# Patient Record
Sex: FEMALE | Race: WHITE | NOT HISPANIC OR LATINO | Employment: OTHER | URBAN - METROPOLITAN AREA
[De-identification: names, ages, dates, MRNs, and addresses within clinical notes are randomized per-mention and may not be internally consistent; named-entity substitution may affect disease eponyms.]

---

## 2017-02-04 ENCOUNTER — OFFICE VISIT (OUTPATIENT)
Dept: FAMILY MEDICINE | Facility: CLINIC | Age: 57
End: 2017-02-04
Payer: COMMERCIAL

## 2017-02-04 VITALS
HEART RATE: 74 BPM | OXYGEN SATURATION: 98 % | HEIGHT: 68 IN | WEIGHT: 290.44 LBS | TEMPERATURE: 98 F | BODY MASS INDEX: 44.02 KG/M2

## 2017-02-04 DIAGNOSIS — E78.5 HYPERLIPIDEMIA ASSOCIATED WITH TYPE 2 DIABETES MELLITUS: ICD-10-CM

## 2017-02-04 DIAGNOSIS — E66.01 MORBID OBESITY, UNSPECIFIED OBESITY TYPE: ICD-10-CM

## 2017-02-04 DIAGNOSIS — E11.69 HYPERLIPIDEMIA ASSOCIATED WITH TYPE 2 DIABETES MELLITUS: ICD-10-CM

## 2017-02-04 DIAGNOSIS — Z01.818 PREOPERATIVE CLEARANCE: Primary | ICD-10-CM

## 2017-02-04 DIAGNOSIS — H40.9 GLAUCOMA, UNSPECIFIED GLAUCOMA, UNSPECIFIED LATERALITY: ICD-10-CM

## 2017-02-04 DIAGNOSIS — M65.312 TRIGGER FINGER OF LEFT THUMB: ICD-10-CM

## 2017-02-04 DIAGNOSIS — Z72.0 TOBACCO ABUSE DISORDER: Chronic | ICD-10-CM

## 2017-02-04 PROCEDURE — 93010 ELECTROCARDIOGRAM REPORT: CPT | Mod: S$GLB,,, | Performed by: INTERNAL MEDICINE

## 2017-02-04 PROCEDURE — 99999 PR PBB SHADOW E&M-EST. PATIENT-LVL III: CPT | Mod: PBBFAC,,, | Performed by: FAMILY MEDICINE

## 2017-02-04 PROCEDURE — 3045F PR MOST RECENT HEMOGLOBIN A1C LEVEL 7.0-9.0%: CPT | Mod: S$GLB,,, | Performed by: FAMILY MEDICINE

## 2017-02-04 PROCEDURE — 4010F ACE/ARB THERAPY RXD/TAKEN: CPT | Mod: S$GLB,,, | Performed by: FAMILY MEDICINE

## 2017-02-04 PROCEDURE — 99214 OFFICE O/P EST MOD 30 MIN: CPT | Mod: S$GLB,,, | Performed by: FAMILY MEDICINE

## 2017-02-04 PROCEDURE — 2022F DILAT RTA XM EVC RTNOPTHY: CPT | Mod: S$GLB,,, | Performed by: FAMILY MEDICINE

## 2017-02-04 PROCEDURE — 93005 ELECTROCARDIOGRAM TRACING: CPT | Mod: S$GLB,,, | Performed by: FAMILY MEDICINE

## 2017-02-04 NOTE — PROGRESS NOTES
"Subjective:      Patient ID: Chelsie Duarte is a 56 y.o. female.    Chief Complaint: preop  HPI   The patient is here for a preop clearance to have surgery on the right eye.    The physician that is performing the surgery is Dr. Garay.    The surgery is being planned for Monday.    I will send a copy of the referral to the surgeon at fax # [phone].    The patient states that there has not been previous problems with sedation.    As an aside, she has a left trigger thumb and she would like to have a referral to see an orthopedist.  NSAID"s have not worked.     Past Medical History:  Past Medical History   Diagnosis Date    DM (diabetes mellitus) type II uncontrolled with eye manifestation     Nicotine addiction      Past Surgical History   Procedure Laterality Date     section       Review of patient's allergies indicates:   Allergen Reactions    Penicillins Anaphylaxis     Current Outpatient Prescriptions on File Prior to Visit   Medication Sig Dispense Refill    atorvastatin (LIPITOR) 80 MG tablet Take 1 tablet (80 mg total) by mouth once daily. 90 tablet 3    azelastine (ASTELIN) 137 mcg (0.1 %) nasal spray 2 sprays (274 mcg total) by Nasal route 2 (two) times daily. 30 mL 2    blood sugar diagnostic (CONTOUR TEST STRIPS) Strp Use daily as directed 100 each 11    brimonidine-timolol (COMBIGAN) 0.2-0.5 % Drop Place 1 drop into the right eye 2 (two) times daily. 10 mL 0    insulin glargine (LANTUS SOLOSTAR) 100 unit/mL (3 mL) InPn pen Inject 60 Units into the skin every morning. 15 mL 11    insulin lispro (HUMALOG KWIKPEN) 100 unit/mL InPn pen Sig: Give the following sliding scale three times a day before meals based on pre-meal glucose check:   If less than 150, give 1 unit SQ.     If 151-200, give 2 units SQ   If 201-250, give 3 units SQ   If 251-300, give 4 units SQ   If 301-350, give 5 units SQ   If 351-400, give 6 units SQ   If greater than 400, call MD. 1 Box 11    insulin " "needles, disposable, (NOVOFINE 32) 32 x 1/4 " Ndle by Misc.(Non-Drug; Combo Route) route.      lisinopril (PRINIVIL,ZESTRIL) 2.5 MG tablet Take 1 tablet (2.5 mg total) by mouth once daily. 30 tablet 11    meloxicam (MOBIC) 15 MG tablet Take 1 tablet (15 mg total) by mouth once daily. 30 tablet 0    albuterol 90 mcg/actuation inhaler Inhale 2 puffs into the lungs every 6 (six) hours as needed for Wheezing. 18 g 1     No current facility-administered medications on file prior to visit.      Social History     Social History    Marital status:      Spouse name: N/A    Number of children: N/A    Years of education: N/A     Occupational History     [employer]     Social History Main Topics    Smoking status: Current Every Day Smoker     Packs/day: 0.50     Types: Cigarettes    Smokeless tobacco: Never Used      Comment: she does not want medicine to quit.     Alcohol use Yes    Drug use: No    Sexual activity: Not on file     Other Topics Concern    Not on file     Social History Narrative     Family History   Problem Relation Age of Onset    Cancer Mother      pancreatic    Cancer Father      lung    Stroke Father     Coronary artery disease Father     Diabetes Father     Hypertension Father     Bladder Cancer Paternal Uncle     Lymphoma Paternal Uncle          Review of Systems   Constitutional: Negative for fatigue, fever and unexpected weight change.   HENT: Negative for congestion, ear pain, postnasal drip and sore throat.    Eyes: Negative for visual disturbance (her pressure was high and they are putting a tube in her eye.).   Respiratory: Negative for cough, chest tightness, shortness of breath and wheezing.    Cardiovascular: Negative for chest pain, palpitations and leg swelling.   Gastrointestinal: Negative for abdominal pain, blood in stool, constipation, diarrhea, nausea and vomiting.   Genitourinary: Negative for dysuria and hematuria.   Musculoskeletal: Positive " "for arthralgias.   Neurological: Negative for weakness and numbness.       Objective:     Visit Vitals    Pulse 74    Temp 98.3 °F (36.8 °C) (Oral)    Ht 5' 8" (1.727 m)    Wt 131.8 kg (290 lb 7.3 oz)    LMP  (Approximate)    SpO2 98%    BMI 44.16 kg/m2       Physical Exam   Constitutional: She appears well-developed and well-nourished. She is cooperative.   HENT:   Head: Normocephalic and atraumatic.   Right Ear: Tympanic membrane, external ear and ear canal normal.   Left Ear: Tympanic membrane, external ear and ear canal normal.   Nose: Nose normal.   Mouth/Throat: Uvula is midline and mucous membranes are normal. No oral lesions. No oropharyngeal exudate, posterior oropharyngeal edema or posterior oropharyngeal erythema.   Eyes: EOM and lids are normal. Pupils are equal, round, and reactive to light. Right eye exhibits no discharge. Left eye exhibits no discharge. Right conjunctiva is not injected. Right conjunctiva has no hemorrhage. Left conjunctiva is not injected. Left conjunctiva has no hemorrhage. No scleral icterus. Right eye exhibits no nystagmus. Left eye exhibits no nystagmus.   Neck: Normal range of motion and full passive range of motion without pain. Neck supple. No JVD present. No tracheal tenderness present. Carotid bruit is not present. No tracheal deviation present. No thyroid mass and no thyromegaly present.   Cardiovascular: Normal rate, regular rhythm, S1 normal and S2 normal.    No murmur heard.  Pulses:       Carotid pulses are 2+ on the right side, and 2+ on the left side.       Radial pulses are 2+ on the right side, and 2+ on the left side.        Posterior tibial pulses are 2+ on the right side, and 2+ on the left side.   Pulmonary/Chest: Effort normal and breath sounds normal. No respiratory distress. She has no wheezes. She has no rhonchi. She has no rales.   Abdominal: Soft. Normal appearance, normal aorta and bowel sounds are normal. She exhibits no distension, no abdominal " bruit, no pulsatile midline mass and no mass. There is no hepatosplenomegaly. There is no tenderness. There is no rebound.   Musculoskeletal:        Right knee: She exhibits no swelling. No tenderness found.        Left knee: She exhibits no swelling. No tenderness found.   She has a left trigger thumb with pain on palpation.    Lymphadenopathy:        Head (right side): No submental and no submandibular adenopathy present.        Head (left side): No submental and no submandibular adenopathy present.     She has no cervical adenopathy.   Neurological: She is alert. She has normal strength. No cranial nerve deficit or sensory deficit.   Skin: Skin is warm and dry. No rash noted. No cyanosis. Nails show no clubbing.   Psychiatric: She has a normal mood and affect. Her speech is normal and behavior is normal. Thought content normal. Cognition and memory are normal.      EKG shows a NSR with a rate of 68 and no acute ST or T wave changes being noted.    Assessment:     1. Preoperative clearance    2. Glaucoma, unspecified glaucoma, unspecified laterality    3. 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 Hyperlipidemia associated with type 2 diabetes mellitus    5. Morbid obesity, unspecified obesity type    6. Tobacco abuse disorder    7. 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

## 2017-02-04 NOTE — MR AVS SNAPSHOT
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, with long-term current use of insulin         Hyperlipidemia associated with type 2 diabetes mellitus         Morbid obesity, unspecified obesity type         Tobacco abuse disorder         Trigger finger of left thumb                To Do List           Future Appointments        Provider Department Dept Phone    2017 8:05 AM LABORATORY, [place] [facility] [phone]    3/1/2017 8:00 AM Alexis De La Fuente MD [facility] Orthoped[facility] [phone]      Goals (5 Years of Data)     None      [facility] On Call     [facility] On [facility] Nurse Care Line - 24/7 Assistance  Registered nurses in the [facility] Center provide clinical advisement, health education, appointment booking, and other advisory services.  Call for this free service at [phone].             Medications           Message regarding Medications     Verify the changes and/or additions to your medication regime listed below are the same as discussed with your clinician today.  If any of these changes or additions are incorrect, please notify your healthcare provider.             Verify that the below list of medications is an accurate representation of the medications you are currently taking.  If none reported, the list may be blank. If incorrect, please contact your healthcare provider. Carry this list with you in case of emergency.           Current  "Medications     albuterol 90 mcg/actuation inhaler Inhale 2 puffs into the lungs every 6 (six) hours as needed for Wheezing.    atorvastatin (LIPITOR) 80 MG tablet Take 1 tablet (80 mg total) by mouth once daily.    azelastine (ASTELIN) 137 mcg (0.1 %) nasal spray 2 sprays (274 mcg total) by Nasal route 2 (two) times daily.    blood sugar diagnostic (CONTOUR TEST STRIPS) Strp Use daily as directed    brimonidine-timolol (COMBIGAN) 0.2-0.5 % Drop Place 1 drop into the right eye 2 (two) times daily.    insulin glargine (LANTUS SOLOSTAR) 100 unit/mL (3 mL) InPn pen Inject 60 Units into the skin every morning.    insulin lispro (HUMALOG KWIKPEN) 100 unit/mL InPn pen Sig: Give the following sliding scale three times a day before meals based on pre-meal glucose check:   If less than 150, give 1 unit SQ.     If 151-200, give 2 units SQ   If 201-250, give 3 units SQ   If 251-300, give 4 units SQ   If 301-350, give 5 units SQ   If 351-400, give 6 units SQ   If greater than 400, call MD.    insulin needles, disposable, (NOVOFINE 32) 32 x 1/4 " Ndle by Misc.(Non-Drug; Combo Route) route.    lisinopril (PRINIVIL,ZESTRIL) 2.5 MG tablet Take 1 tablet (2.5 mg total) by mouth once daily.    meloxicam (MOBIC) 15 MG tablet Take 1 tablet (15 mg total) by mouth once daily.           Clinical Reference Information           Your Vitals Were     Pulse Temp Height Weight Last Period SpO2    74 98.3 °F (36.8 °C) (Oral) 5' 8" (1.727 m) 131.8 kg (290 lb 7.3 oz) (Approximate) 98%    BMI                44.16 kg/m2          Allergies as of 2/4/2017     Penicillins      Immunizations Administered on Date of Encounter - 2/4/2017     None      Orders Placed During Today's Visit      Normal Orders This Visit    Ambulatory referral to Orthopedics     Future Labs/Procedures Expected by Expires    EKG 12-lead  2/4/2017 (Approximate) 2/4/2018      Smoking Cessation     If you would like to quit smoking:   You may be eligible for free services if you " are a [place] resident and started smoking cigarettes before September 1, 1988.  Call the Smoking Cessation Trust (SCT) toll free at (940) 934-7747 or (492) 036-8833.   Call 1-800-QUIT-NOW if you do not meet the above criteria.            Language Assistance Services     ATTENTION: Language assistance services are available, free of charge. Please call [phone].      ATENCIÓN: Si habla español, tiene a bolanos disposición servicios gratuitos de asistencia lingüística. Llame al [phone].     CHÚ Ý: N?u b?n nói Ti?ng Vi?t, có các d?ch v? h? tr? ngôn ng? mi?n phí dành cho b?n. G?i s? [phone].         [facility] complies with applicable Federal civil rights laws and does not discriminate on the basis of race, color, national origin, age, disability, or sex.

## 2017-02-14 ENCOUNTER — LAB VISIT (OUTPATIENT)
Dept: LAB | Facility: HOSPITAL | Age: 57
End: 2017-02-14
Attending: FAMILY MEDICINE
Payer: COMMERCIAL

## 2017-02-14 DIAGNOSIS — E78.5 HYPERLIPIDEMIA: ICD-10-CM

## 2017-02-14 LAB
ALBUMIN SERPL BCP-MCNC: 3.2 G/DL
ALP SERPL-CCNC: 83 U/L
ALT SERPL W/O P-5'-P-CCNC: 14 U/L
AST SERPL-CCNC: 16 U/L
BILIRUB DIRECT SERPL-MCNC: 0.2 MG/DL
BILIRUB SERPL-MCNC: 0.6 MG/DL
CHOLEST/HDLC SERPL: 2.4 {RATIO}
HDL/CHOLESTEROL RATIO: 42.1 %
HDLC SERPL-MCNC: 140 MG/DL
HDLC SERPL-MCNC: 59 MG/DL
LDLC SERPL CALC-MCNC: 65 MG/DL
NONHDLC SERPL-MCNC: 81 MG/DL
PROT SERPL-MCNC: 6.9 G/DL
TRIGL SERPL-MCNC: 80 MG/DL

## 2017-02-14 PROCEDURE — 36415 COLL VENOUS BLD VENIPUNCTURE: CPT | Mod: PO

## 2017-02-14 PROCEDURE — 80076 HEPATIC FUNCTION PANEL: CPT

## 2017-02-14 PROCEDURE — 80061 LIPID PANEL: CPT

## 2017-02-20 ENCOUNTER — TELEPHONE (OUTPATIENT)
Dept: FAMILY MEDICINE | Facility: CLINIC | Age: 57
End: 2017-02-20

## 2017-02-20 DIAGNOSIS — E78.5 HYPERLIPIDEMIA, UNSPECIFIED HYPERLIPIDEMIA TYPE: Primary | ICD-10-CM

## 2017-02-21 NOTE — TELEPHONE ENCOUNTER
I have signed for the following orders AND/OR meds.  Please call the patient and ask the patient to schedule the testing AND/OR inform about any medications that were sent.      Orders Placed This Encounter   Procedures    Lipid panel     Standing Status:   Future     Standing Expiration Date:   4/21/2018    Hepatic function panel     Standing Status:   Future     Standing Expiration Date:   4/21/2018

## 2017-03-01 ENCOUNTER — OFFICE VISIT (OUTPATIENT)
Dept: ORTHOPEDICS | Facility: CLINIC | Age: 57
End: 2017-03-01
Payer: COMMERCIAL

## 2017-03-01 VITALS
BODY MASS INDEX: 44.25 KG/M2 | HEIGHT: 68 IN | WEIGHT: 292 LBS | DIASTOLIC BLOOD PRESSURE: 85 MMHG | SYSTOLIC BLOOD PRESSURE: 130 MMHG

## 2017-03-01 DIAGNOSIS — M65.312 TRIGGER THUMB OF LEFT HAND: Primary | ICD-10-CM

## 2017-03-01 DIAGNOSIS — M77.8 LEFT WRIST TENDONITIS: ICD-10-CM

## 2017-03-01 DIAGNOSIS — M79.642 LEFT HAND PAIN: ICD-10-CM

## 2017-03-01 DIAGNOSIS — M25.649 THUMB JOINT STIFFNESS: ICD-10-CM

## 2017-03-01 PROCEDURE — 1160F RVW MEDS BY RX/DR IN RCRD: CPT | Mod: S$GLB,,, | Performed by: ORTHOPAEDIC SURGERY

## 2017-03-01 PROCEDURE — 99204 OFFICE O/P NEW MOD 45 MIN: CPT | Mod: 25,S$GLB,, | Performed by: ORTHOPAEDIC SURGERY

## 2017-03-01 PROCEDURE — 3075F SYST BP GE 130 - 139MM HG: CPT | Mod: S$GLB,,, | Performed by: ORTHOPAEDIC SURGERY

## 2017-03-01 PROCEDURE — 3079F DIAST BP 80-89 MM HG: CPT | Mod: S$GLB,,, | Performed by: ORTHOPAEDIC SURGERY

## 2017-03-01 PROCEDURE — 20550 NJX 1 TENDON SHEATH/LIGAMENT: CPT | Mod: FA,S$GLB,, | Performed by: ORTHOPAEDIC SURGERY

## 2017-03-01 PROCEDURE — 99999 PR PBB SHADOW E&M-EST. PATIENT-LVL III: CPT | Mod: PBBFAC,,, | Performed by: ORTHOPAEDIC SURGERY

## 2017-03-01 RX ORDER — TRIAMCINOLONE ACETONIDE 40 MG/ML
40 INJECTION, SUSPENSION INTRA-ARTICULAR; INTRAMUSCULAR
Status: DISCONTINUED | OUTPATIENT
Start: 2017-03-01 | End: 2017-03-01 | Stop reason: HOSPADM

## 2017-03-01 RX ORDER — PREDNISOLONE ACETATE 10 MG/ML
SUSPENSION/ DROPS OPHTHALMIC
COMMUNITY
Start: 2017-02-24 | End: 2018-09-10

## 2017-03-01 RX ADMIN — TRIAMCINOLONE ACETONIDE 40 MG: 40 INJECTION, SUSPENSION INTRA-ARTICULAR; INTRAMUSCULAR at 04:03

## 2017-03-01 NOTE — PATIENT INSTRUCTIONS
Treating Trigger Finger     The tendon sheath is opened to release the tendon. Once the tendon can move freely again, the finger can bend and straighten more normally.     Trigger finger occurs when the tissue inside your finger or thumb becomes inflamed. Mild cases can be treated without surgery. If the problem is severe, surgery may be needed. Your doctor will discuss your options with you.  Nonsurgical treatment  For mild symptoms, your doctor may have you rest the finger or thumb. You may also be told to take anti-inflammatory medicines. These include ibuprofen or aspirin. You may be given an injection of medicine in the base of the finger or thumb. This typically is a steroid, such as cortisone.  Surgery  If nonsurgical treatments dont ease your symptoms, surgery may be recommended. A tendon is a cordlike fiber that attaches muscle to bone and allows joints to bend. The tendon is surrounded by a protective cover called a sheath. During surgery, the sheath in your finger or thumb is opened to enlarge the space and release the swollen tendon. This allows the finger or thumb to bend and straighten normally. Surgery takes about 20 minutes. It can often be done using a local anesthetic. You may be able to go home the same day. Your hand will be wrapped in a soft bandage. You may need to wear a plaster splint for a short time to keep the finger or thumb still as it heals. The stitches will be removed in about 2 weeks. Your doctor can discuss the risks and benefits of surgery with you.  Date Last Reviewed: 9/21/2015 © 2000-2016 [employer]. [address] [place] [address]. All rights reserved. This information is not intended as a substitute for professional medical care. Always follow your healthcare professional's instructions.        What is Trigger Finger?  Trigger finger is an inflammation of tissue inside your finger or thumb. It is also called tenosynovitis (ten-oh-sin-oh-VY-tis).  Tendons (cordlike fibers that attach muscle to bone and allow you to bend the joints) become swollen. So does the synovium (a slick membrane that allows the tendons to move easily). This makes it difficult to straighten the finger or thumb.    Causes  Repeated use of a tool with strong gripping, such as a drill or wrench, can irritate and inflame the tendons and the synovium. It is also more common in certain medical conditions such as rheumatoid arthritis, gout, and diabetes. But often the cause of trigger finger is unknown.  Inside your finger  Tendons connect muscles in your forearm to the bones in your fingers. The tendons in each finger are surrounded by a protective tendon sheath. This sheath is lined with synovium, which produces a fluid that allows the tendons to slide easily when you bend and straighten the finger. If a tendon is irritated, it becomes inflamed.  When a tendon is inflamed  When a tendon is inflamed, it causes the lining of the tendon sheath to swell and thicken. Or the tendon itself may thicken. Then the sheath pinches the tendon, and the tendon can no longer slide easily inside the sheath. When you straighten your finger, the tendon sticks or locks as it tries to squeeze back through the sheath.    Symptoms  The first sign of trigger finger may be pain where the finger or thumb joins the palm. You may also notice some swelling. As the tendon becomes more inflamed, the finger may start to catch when you try to straighten it. When the locked tendon releases, the finger jumps, as if you were releasing the trigger of a gun. This further irritates the tendon, and may set up a cycle of catching and swelling.   Date Last Reviewed: 9/28/2015  © 4926-1136 [employer]. [address] [place] [address], [place] [address]. All rights reserved. This information is not intended as a substitute for professional medical care. Always follow your healthcare professional's instructions.

## 2017-03-01 NOTE — PROGRESS NOTES
Subjective:          Chief Complaint: Chelsie Duarte is a 56 y.o. female who had concerns including Pain of the Left Hand.    HPI Comments: Mrs. Duarte is here for evaluation of her left thumb and wrist. She currently has a left trigger thumb and wrist pain. She has been using a wrist brace and taping her left thumb to prevent flexion.    Pain: 7/10    She has not tried regular NSAIDs; injections or therapy    Pain   This is a new problem. The current episode started more than 1 month ago. The problem occurs daily. The problem has been gradually worsening. Associated symptoms include joint swelling and myalgias. Pertinent negatives include no rash or weakness. She has tried immobilization, NSAIDs, oral narcotics and ice for the symptoms. The treatment provided mild relief.       Review of Systems   Constitution: Negative for weakness.   Eyes:        Glaucoma   Endocrine:        Diabetes   Skin: Negative for rash.   Musculoskeletal: Positive for joint pain, joint swelling, myalgias and stiffness.   All other systems reviewed and are negative.                  Objective:        General: Chelsie is well-developed, well-nourished, appears stated age, in no acute distress, alert and oriented to time, place and person.     General    Vitals reviewed.  Constitutional: She is oriented to person, place, and time. She appears well-developed and well-nourished. No distress.   HENT:   Head: Normocephalic and atraumatic.   Nose: Nose normal.   Eyes: Pupils are equal, round, and reactive to light.   Cardiovascular: Normal rate.    Pulmonary/Chest: Effort normal.   Neurological: She is alert and oriented to person, place, and time.   Psychiatric: She has a normal mood and affect. Her behavior is normal. Judgment and thought content normal.             Right Hand/Wrist Exam   Right hand exam is normal.      Left Hand/Wrist Exam     Inspection   Scars: Wrist - absent Hand -  absent  Effusion: Wrist - absent Hand -   absent  Bruising: Wrist - absent Hand -  absent  Deformity: Wrist - absent Hand -  absent    Pain   Hand - The patient exhibits pain of the thumb MCP.  Wrist - The patient exhibits pain of the extensory musculature.    Tenderness   The patient is tender to palpation of the dorsal area and levi area.     Range of Motion     Wrist   Extension: normal   Flexion: normal   Pronation: normal   Supination: normal     Tests     Atrophy  Thenar:  Negative  Hypothenar:  negative  Intrinsic: negative  1st Dorsal Interosseous:  negative    Other     Sensory Exam  Median Distribution: normal  Ulnar Distribution: normal  Radial Distribution: normal    Comments:  Palpable nodule noted on volar aspect of left thumb MCP; decreased left thumb IP ROM on exam secondary to apprehension of triggering.          Muscle Strength   Left Upper Extremity  Wrist Extension: 5/5/5   Wrist Flexion: 5/5/5   :  4/5/5   Index Finger: 5/5  Middle Finger: 5/5  Ring Finger: 5/5  Little Finger: 5/5  Thumb - APB: 4/5  Thumb - FPL: 4/5  Pinch Mechanism: 4/5    Vascular Exam       Capillary Refill  Left Hand: normal capillary refill    Current and previous radiographic studies and results were reviewed with the patient:     Findings: There is no radiographic evidence of acute osseous, articular, or soft tissue abnormality. There are mild degenerative changes at the IP joint of the thumb. No osseous erosions visualized.   Impression     As above. No acute findings.             Assessment:       Encounter Diagnoses   Name Primary?    Left hand pain     Trigger thumb of left hand Yes    Left wrist tendonitis     Thumb joint stiffness           Plan:         Trigger thumb injection today  NSAIDs PRN  Occupational Therapy  F/U in 6 weeks

## 2017-03-01 NOTE — LETTER
March 1, 2017      Yasir Emmanuel MD  [address] [address]           [place] [facility]  [address] [address]  Phone: [phone]          Patient: Chelsie Duarte   MR Number: [id number]   YOB: 1960   Date of Visit: 3/1/2017       Dear Dr. Yasir Emmanuel:    Thank you for referring Chelsie Duarte to me for evaluation. Attached you will find relevant portions of my assessment and plan of care.    If you have questions, please do not hesitate to call me. I look forward to following Chelsie Duarte along with you.    Sincerely,    Alexis De La Fuente MD    Enclosure  CC:  No Recipients    If you would like to receive this communication electronically, please contact [email or web address] or [phone] to request more information on [employer] access.    For providers and/or their staff who would like to refer a patient to [facility], please contact us through our one-stop-shop provider referral line, [facility], at [phone].    If you feel you have received this communication in error or would no longer like to receive these types of communications, please e-mail [email or web address]

## 2017-03-01 NOTE — PROCEDURES
Tendon Sheath  Date/Time: 3/1/2017 4:40 PM  Performed by: MACEY HARRINGTON  Authorized by: MACEY HARRINGTON     Consent Done?:  Yes (Verbal)  Timeout: prior to procedure the correct patient, procedure, and site was verified    Indications:  Pain  Site marked: the procedure site was marked    Timeout: prior to procedure the correct patient, procedure, and site was verified    Location:  Thumb  Site:  L thumb MCP  Prep: patient was prepped and draped in usual sterile fashion    Ultrasonic guidance for needle placement?: No    Needle size:  25 G  Approach:  Volar  Medications:  40 mg triamcinolone acetonide 40 mg/mL  Patient tolerance:  Patient tolerated the procedure well with no immediate complications

## 2017-03-22 ENCOUNTER — PATIENT MESSAGE (OUTPATIENT)
Dept: FAMILY MEDICINE | Facility: CLINIC | Age: 57
End: 2017-03-22

## 2017-03-22 RX ORDER — INSULIN LISPRO 100 [IU]/ML
INJECTION, SOLUTION INTRAVENOUS; SUBCUTANEOUS
Qty: 1 BOX | Refills: 11 | Status: SHIPPED | OUTPATIENT
Start: 2017-03-22 | End: 2017-03-24 | Stop reason: SDUPTHER

## 2017-03-24 RX ORDER — INSULIN LISPRO 100 [IU]/ML
INJECTION, SOLUTION INTRAVENOUS; SUBCUTANEOUS
Qty: 1 BOX | Refills: 11 | Status: SHIPPED | OUTPATIENT
Start: 2017-03-24 | End: 2017-04-04 | Stop reason: SDUPTHER

## 2017-03-27 ENCOUNTER — TELEPHONE (OUTPATIENT)
Dept: ENDOCRINOLOGY | Facility: CLINIC | Age: 57
End: 2017-03-27

## 2017-03-27 NOTE — TELEPHONE ENCOUNTER
Received fax from [facility] for refill on Novolg, pt has not been seen in over a year, called pt to schedule apt, no answer, LM.

## 2017-03-30 ENCOUNTER — TELEPHONE (OUTPATIENT)
Dept: ENDOCRINOLOGY | Facility: CLINIC | Age: 57
End: 2017-03-30

## 2017-03-30 NOTE — TELEPHONE ENCOUNTER
Received fax from [facility] for refill on Novolg, pt has not been seen in over a year, called pt to schedule apt, no answer.

## 2017-04-03 ENCOUNTER — PATIENT MESSAGE (OUTPATIENT)
Dept: FAMILY MEDICINE | Facility: CLINIC | Age: 57
End: 2017-04-03

## 2017-04-03 DIAGNOSIS — Z12.31 ENCOUNTER FOR SCREENING MAMMOGRAM FOR BREAST CANCER: ICD-10-CM

## 2017-04-04 ENCOUNTER — TELEPHONE (OUTPATIENT)
Dept: FAMILY MEDICINE | Facility: CLINIC | Age: 57
End: 2017-04-04

## 2017-04-04 PROBLEM — H43.10 VITREOUS HEMORRHAGE: Status: ACTIVE | Noted: 2017-04-04

## 2017-04-04 RX ORDER — INSULIN LISPRO 100 [IU]/ML
INJECTION, SOLUTION INTRAVENOUS; SUBCUTANEOUS
Qty: 1 BOX | Refills: 11 | Status: SHIPPED | OUTPATIENT
Start: 2017-04-04 | End: 2018-06-05 | Stop reason: SDUPTHER

## 2017-04-04 RX ORDER — INSULIN LISPRO 100 [IU]/ML
INJECTION, SOLUTION INTRAVENOUS; SUBCUTANEOUS
Qty: 1 BOX | Refills: 11 | Status: SHIPPED | OUTPATIENT
Start: 2017-04-04 | End: 2017-04-04 | Stop reason: SDUPTHER

## 2017-04-04 NOTE — TELEPHONE ENCOUNTER
----- Message from Tamiko Dooley sent at 4/4/2017  3:08 PM CDT -----  Is at pharmacy and her hemolog medication is not available. Please call back at [phone]. thanks

## 2017-04-04 NOTE — TELEPHONE ENCOUNTER
The a1c is due and i put in order.  Call her and book the day she wants.  She is also due a mammogram in the near future.  You can book after it has been a year from the last.     Tell her that I have written for humalog and novolog and I don't know what to do besides writing a prescription.  Can you call the pharmacy and ask them to run both and tell us the one that is covered and we will send that in again.  If they need something else as far as a prior authorization, get what is needed or tell me what has been done and if they have denied it all.  I just don't know where this stands.    Her eye exam can be faxed to us at [phone].

## 2017-04-04 NOTE — TELEPHONE ENCOUNTER
----- Message from Tamiko Dooley sent at 4/4/2017  3:08 PM CDT -----  Is at pharmacy and her hemolog medication is not available. Please call back at [phone]. thanks

## 2017-04-04 NOTE — TELEPHONE ENCOUNTER
----- Message from Margie Cote sent at 4/4/2017  3:42 PM CDT -----  Contact: pt   Pt is calling nurse staff regarding a refill Humalog. Pt call back [phone] to be advise       [facility] [address] [place] [address] [address] [facility] ([place], [place])  [address] [facility] [address]  Phone: [phone] Fax: [phone]

## 2017-04-04 NOTE — TELEPHONE ENCOUNTER
She sent me her eye records in the [facility] system.  Please print and place where you need them if you need to reference them somewhere else.

## 2017-04-05 ENCOUNTER — PATIENT MESSAGE (OUTPATIENT)
Dept: ADMINISTRATIVE | Facility: HOSPITAL | Age: 57
End: 2017-04-05

## 2017-04-05 ENCOUNTER — PATIENT MESSAGE (OUTPATIENT)
Dept: FAMILY MEDICINE | Facility: CLINIC | Age: 57
End: 2017-04-05

## 2017-04-06 ENCOUNTER — TELEPHONE (OUTPATIENT)
Dept: FAMILY MEDICINE | Facility: CLINIC | Age: 57
End: 2017-04-06

## 2017-04-06 NOTE — TELEPHONE ENCOUNTER
Received letter from [employer], Humalog Kwikpen approved, auth # [id number], approved 4/5/17 until the lifetime of [employer] benefit

## 2017-05-04 ENCOUNTER — LAB VISIT (OUTPATIENT)
Dept: LAB | Facility: HOSPITAL | Age: 57
End: 2017-05-04
Attending: FAMILY MEDICINE
Payer: COMMERCIAL

## 2017-05-04 PROCEDURE — 83036 HEMOGLOBIN GLYCOSYLATED A1C: CPT

## 2017-05-04 PROCEDURE — 36415 COLL VENOUS BLD VENIPUNCTURE: CPT | Mod: PO

## 2017-05-05 DIAGNOSIS — E11.9 TYPE 2 DIABETES MELLITUS WITHOUT COMPLICATION: ICD-10-CM

## 2017-05-05 LAB
ESTIMATED AVG GLUCOSE: 206 MG/DL
HBA1C MFR BLD HPLC: 8.8 %

## 2017-05-09 NOTE — PROGRESS NOTES
THE A1C IS HIGH.  PLEASE CHANGE THE INSULINS TO THE FOLLOWING:  insulin glargine (LANTUS SOLOSTAR) 100 unit/mL (3 mL) InPn pen 15 mL 11 11/28/2016 11/28/2017     Sig: Inject 70 Units into the skin every morning.    Route: Subcutaneous    insulin lispro (HUMALOG KWIKPEN) 100 unit/mL InPn pen 1 Box 11 4/4/2017     Sig: Sig: Give the following sliding scale three times a day before meals based on pre-meal glucose check:   If less than 150, give 2 unitS SQ.     If 151-200, give 3 units SQ   If 201-250, give 4 units SQ   If 251-300, give 5 units SQ   If 301-350, give 6 units SQ   If 351-400, give 7 units SQ   If greater than 400, call MD.      RECHECK AN A1C IN 3 MONTHS.   SEND ME YOUR LOG OF GLUCOSES IN 10 DAYS.

## 2017-05-11 ENCOUNTER — TELEPHONE (OUTPATIENT)
Dept: FAMILY MEDICINE | Facility: CLINIC | Age: 57
End: 2017-05-11

## 2017-05-11 NOTE — TELEPHONE ENCOUNTER
----- Message from Ptaricia Villanueva sent at 5/11/2017  1:00 PM CDT -----  Contact: pt  Pt request call back concerning a referral, pt can be reached at [phone]///thxMW

## 2017-05-15 RX ORDER — ATORVASTATIN CALCIUM 80 MG/1
TABLET, FILM COATED ORAL
Qty: 90 TABLET | Refills: 0 | Status: SHIPPED | OUTPATIENT
Start: 2017-05-15 | End: 2017-06-28 | Stop reason: SDUPTHER

## 2017-05-16 ENCOUNTER — TELEPHONE (OUTPATIENT)
Dept: FAMILY MEDICINE | Facility: CLINIC | Age: 57
End: 2017-05-16

## 2017-05-16 DIAGNOSIS — E11.9 TYPE 2 DIABETES MELLITUS WITHOUT COMPLICATION, UNSPECIFIED LONG TERM INSULIN USE STATUS: Primary | ICD-10-CM

## 2017-05-16 NOTE — TELEPHONE ENCOUNTER
----- Message from Yasir Emmanuel MD sent at 5/16/2017 11:10 AM CDT -----  Ask her to recheck the a1c in 3 months as it takes that long for the a1c to fully change.

## 2017-05-17 NOTE — TELEPHONE ENCOUNTER
I have signed for the following orders AND/OR meds.  Please call the patient and ask the patient to schedule the testing AND/OR inform about any medications that were sent.      Orders Placed This Encounter   Procedures    Hemoglobin A1c     Standing Status:   Future     Standing Expiration Date:   7/15/2018

## 2017-05-23 ENCOUNTER — PATIENT MESSAGE (OUTPATIENT)
Dept: ADMINISTRATIVE | Facility: HOSPITAL | Age: 57
End: 2017-05-23

## 2017-05-23 ENCOUNTER — PATIENT MESSAGE (OUTPATIENT)
Dept: ORTHOPEDICS | Facility: CLINIC | Age: 57
End: 2017-05-23

## 2017-05-23 ENCOUNTER — PATIENT MESSAGE (OUTPATIENT)
Dept: FAMILY MEDICINE | Facility: CLINIC | Age: 57
End: 2017-05-23

## 2017-05-23 DIAGNOSIS — F17.210 CIGARETTE NICOTINE DEPENDENCE WITHOUT COMPLICATION: Primary | ICD-10-CM

## 2017-05-24 ENCOUNTER — PATIENT MESSAGE (OUTPATIENT)
Dept: FAMILY MEDICINE | Facility: CLINIC | Age: 57
End: 2017-05-24

## 2017-05-24 RX ORDER — INSULIN GLARGINE 100 [IU]/ML
70 INJECTION, SOLUTION SUBCUTANEOUS EVERY MORNING
Qty: 20 ML | Refills: 11 | Status: SHIPPED | OUTPATIENT
Start: 2017-05-24 | End: 2017-10-12 | Stop reason: SDUPTHER

## 2017-05-24 NOTE — TELEPHONE ENCOUNTER
I have signed for the following orders AND/OR meds.  Please call the patient and ask the patient to schedule the testing AND/OR inform about any medications that were sent.      Orders Placed This Encounter   Procedures    Ambulatory referral to Smoking Cessation Program     Referral Priority:   Routine     Referral Type:   Consultation     Referral Reason:   Specialty Services Required     Requested Specialty:   Addiction Medicine     Number of Visits Requested:   1

## 2017-05-25 RX ORDER — BENZONATATE 200 MG/1
200 CAPSULE ORAL 3 TIMES DAILY PRN
Qty: 30 CAPSULE | Refills: 0 | Status: SHIPPED | OUTPATIENT
Start: 2017-05-25 | End: 2017-06-04

## 2017-05-25 RX ORDER — AZITHROMYCIN 250 MG/1
TABLET, FILM COATED ORAL
Qty: 6 TABLET | Refills: 0 | Status: SHIPPED | OUTPATIENT
Start: 2017-05-25 | End: 2017-09-19

## 2017-06-19 ENCOUNTER — CLINICAL SUPPORT (OUTPATIENT)
Dept: SMOKING CESSATION | Facility: CLINIC | Age: 57
End: 2017-06-19
Payer: COMMERCIAL

## 2017-06-19 DIAGNOSIS — F17.210 HEAVY CIGARETTE SMOKER (20-39 PER DAY): Primary | ICD-10-CM

## 2017-06-19 PROCEDURE — 99404 PREV MED CNSL INDIV APPRX 60: CPT | Mod: S$GLB,,,

## 2017-06-19 RX ORDER — VARENICLINE TARTRATE 0.5 (11)-1
KIT ORAL
Qty: 1 PACKAGE | Refills: 0 | Status: SHIPPED | OUTPATIENT
Start: 2017-06-19 | End: 2017-07-18 | Stop reason: DRUGHIGH

## 2017-06-19 RX ORDER — IBUPROFEN 200 MG
1 TABLET ORAL DAILY
Qty: 14 PATCH | Refills: 0 | Status: SHIPPED | OUTPATIENT
Start: 2017-06-19 | End: 2017-07-18 | Stop reason: SDUPTHER

## 2017-06-27 ENCOUNTER — PATIENT MESSAGE (OUTPATIENT)
Dept: ORTHOPEDICS | Facility: CLINIC | Age: 57
End: 2017-06-27

## 2017-06-28 RX ORDER — ATORVASTATIN CALCIUM 80 MG/1
TABLET, FILM COATED ORAL
Qty: 30 TABLET | Refills: 0 | Status: SHIPPED | OUTPATIENT
Start: 2017-06-28 | End: 2017-12-05 | Stop reason: SDUPTHER

## 2017-07-01 ENCOUNTER — PATIENT MESSAGE (OUTPATIENT)
Dept: FAMILY MEDICINE | Facility: CLINIC | Age: 57
End: 2017-07-01

## 2017-07-01 NOTE — TELEPHONE ENCOUNTER
Please call her to get her eye doctor that she had her eye exam with and get a copy of her last visit with them.

## 2017-07-05 ENCOUNTER — PATIENT OUTREACH (OUTPATIENT)
Dept: ADMINISTRATIVE | Facility: HOSPITAL | Age: 57
End: 2017-07-05

## 2017-07-05 NOTE — PROGRESS NOTES
Attempted to contact pt concerning her last eye exam. No answer. Left pt a voice message to return my call. Also will send a pt portal message.

## 2017-07-17 ENCOUNTER — CLINICAL SUPPORT (OUTPATIENT)
Dept: SMOKING CESSATION | Facility: CLINIC | Age: 57
End: 2017-07-17
Payer: COMMERCIAL

## 2017-07-17 DIAGNOSIS — F17.210 HEAVY CIGARETTE SMOKER (20-39 PER DAY): Primary | ICD-10-CM

## 2017-07-17 PROCEDURE — 90853 GROUP PSYCHOTHERAPY: CPT | Mod: S$GLB,,,

## 2017-07-17 NOTE — Clinical Note
Patient reports she is smoking 1 pack of cigarettes per day. She is just starting her medications [place]. She has set a quit date of 8/8/17. The patient remains on the prescribed tobacco cessation medication regimen of 1 mg Chantix BID with a 21 mg nicotine patch QD without any negative side effects at this time.

## 2017-07-18 RX ORDER — IBUPROFEN 200 MG
1 TABLET ORAL DAILY
Qty: 14 PATCH | Refills: 0 | Status: SHIPPED | OUTPATIENT
Start: 2017-07-18 | End: 2017-07-31 | Stop reason: SDUPTHER

## 2017-07-18 RX ORDER — VARENICLINE TARTRATE 1 MG/1
1 TABLET, FILM COATED ORAL 2 TIMES DAILY
Qty: 56 TABLET | Refills: 0 | Status: SHIPPED | OUTPATIENT
Start: 2017-07-18 | End: 2017-10-11 | Stop reason: SDUPTHER

## 2017-07-18 NOTE — PROGRESS NOTES
Smoking Cessation Group Session #3    Site: [facility]  Date:  7/18/2017  Clinical Status of Patient: Outpatient   Length of Service and Code: 90 minutes - 90483   Number in Attendance: 8  Group Activities/Focus of Group:  completion of TCRS (Tobacco Cessation Rating Scale) reviewed strategies, controlling environment, cues, triggers, new goals set. Introduced high risk situations with preparation interventions, caffeine similarities with withdrawal issues of habit and nicotine, Alcohol, Understanding urges, cravings, stress and relaxation. Open discussion with intervention discussion.    Target symptoms:  withdrawal and medication side effects             The following were rated moderate (3) to severe (4) on TCRS:       Moderate 3: none     Severe 4:   none  Patient's Response to Intervention: Patient reports she is smoking 1 pack of cigarettes per day. She is just starting her medications as [place] been [place]. She has set a quit date of 8/8/17. The patient remains on the prescribed tobacco cessation medication regimen of 1 mg Chantix BID with a 21 mg nicotine patch QD without any negative side effects at this time.     Progress Toward Goals and Other Mental Status Changes: The patient denies any abnormal behavioral or mental changes at this time.     Plan: The patient will continue with group therapy sessions and medication monitoring by CTTS. Prescribed medication management will be by physician.     Return to Clinic: 1 week    Quit Date: none   Planned Quit Date: 8/8/17

## 2017-07-31 ENCOUNTER — CLINICAL SUPPORT (OUTPATIENT)
Dept: SMOKING CESSATION | Facility: CLINIC | Age: 57
End: 2017-07-31
Payer: COMMERCIAL

## 2017-07-31 DIAGNOSIS — F17.210 HEAVY CIGARETTE SMOKER (20-39 PER DAY): ICD-10-CM

## 2017-07-31 DIAGNOSIS — F17.210 LIGHT CIGARETTE SMOKER (1-9 CIGS/DAY): Primary | ICD-10-CM

## 2017-07-31 PROCEDURE — 90853 GROUP PSYCHOTHERAPY: CPT | Mod: S$GLB,,,

## 2017-07-31 RX ORDER — IBUPROFEN 200 MG
1 TABLET ORAL DAILY
Qty: 28 PATCH | Refills: 0 | Status: SHIPPED | OUTPATIENT
Start: 2017-07-31 | End: 2017-10-23 | Stop reason: ALTCHOICE

## 2017-07-31 NOTE — PROGRESS NOTES
Smoking Cessation Group Session #5    Site: [place]  Date:  7/31/2017  Clinical Status of Patient: Outpatient   Length of Service and Code: 90 minutes - 03533   Number in Attendance: 2  Group Activities/Focus of Group:  completion of TCRS (Tobacco Cessation Rating Scale) reviewed strategies, habitual behavior, high risks situations, understanding urges and cravings, stress and relaxation with open discussion and additional interventions, Introduced lapses, relapses, understanding them and analyzing the situation of a lapse, conflict issues that may be linked to a lapse.     Target symptoms:  withdrawal and medication side effects             The following were rated moderate (3) to severe (4) on TCRS:       Moderate 3: Weight gain. We discussed making healthy food choices and moderated exercise.      Severe 4:   Crave tobacco. We talked about craving tobacco as a normal withdrawal symptom. Offered patient nicotine gum or lozenges for strong urges but she declined at this time.   Patient's Response to Intervention: Patient reports decreasing cigarette smoking from 1.5 packs per day to 1-12 cigarettes per day. She is please with her progress. She has set a quit date of 8/8/17. The patient remains on the prescribed tobacco cessation medication regimen of 1 mg Chantix BID with a 21 mg nicotine patch QD without any negative side effects at this time.     Progress Toward Goals and Other Mental Status Changes: The patient denies any abnormal behavioral or mental changes at this time.     Plan: The patient will continue with group therapy sessions and medication monitoring by CTTS. Prescribed medication management will be by physician.     Return to Clinic: 1 week    Quit Date: none   Planned Quit Date: 8/8/17

## 2017-07-31 NOTE — Clinical Note
Patient reports decreasing cigarette smoking from 1.5 packs per day to 1-12 cigarettes per day. She is please with her progress. She has set a quit date of 8/8/17. The patient remains on the prescribed tobacco cessation medication regimen of 1 mg Chantix BID with a 21 mg nicotine patch QD without any negative side effects at this time.

## 2017-08-21 ENCOUNTER — PATIENT MESSAGE (OUTPATIENT)
Dept: ORTHOPEDICS | Facility: CLINIC | Age: 57
End: 2017-08-21

## 2017-08-21 ENCOUNTER — PATIENT MESSAGE (OUTPATIENT)
Dept: ADMINISTRATIVE | Facility: HOSPITAL | Age: 57
End: 2017-08-21

## 2017-08-21 ENCOUNTER — PATIENT MESSAGE (OUTPATIENT)
Dept: FAMILY MEDICINE | Facility: CLINIC | Age: 57
End: 2017-08-21

## 2017-08-22 ENCOUNTER — CLINICAL SUPPORT (OUTPATIENT)
Dept: SMOKING CESSATION | Facility: CLINIC | Age: 57
End: 2017-08-22
Payer: COMMERCIAL

## 2017-08-22 DIAGNOSIS — F17.210 LIGHT CIGARETTE SMOKER (1-9 CIGS/DAY): Primary | ICD-10-CM

## 2017-08-22 PROCEDURE — 99407 BEHAV CHNG SMOKING > 10 MIN: CPT | Mod: S$GLB,,,

## 2017-08-22 RX ORDER — DM/P-EPHED/ACETAMINOPH/DOXYLAM 30-7.5/3
2 LIQUID (ML) ORAL
Qty: 72 LOZENGE | Refills: 0 | Status: SHIPPED | OUTPATIENT
Start: 2017-08-22 | End: 2017-10-23 | Stop reason: ALTCHOICE

## 2017-09-19 PROBLEM — Z01.818 PREOPERATIVE CLEARANCE: Status: RESOLVED | Noted: 2017-02-04 | Resolved: 2017-09-19

## 2017-09-19 PROBLEM — F17.210 CIGARETTE NICOTINE DEPENDENCE WITHOUT COMPLICATION: Status: ACTIVE | Noted: 2017-09-19

## 2017-09-19 RX ORDER — LISINOPRIL 2.5 MG/1
TABLET ORAL
Qty: 30 TABLET | Refills: 11 | Status: SHIPPED | OUTPATIENT
Start: 2017-09-19 | End: 2018-12-01 | Stop reason: SDUPTHER

## 2017-09-21 ENCOUNTER — PATIENT MESSAGE (OUTPATIENT)
Dept: ENDOSCOPY | Facility: HOSPITAL | Age: 57
End: 2017-09-21

## 2017-09-21 DIAGNOSIS — E66.01 MORBID OBESITY, UNSPECIFIED OBESITY TYPE: Primary | ICD-10-CM

## 2017-09-21 DIAGNOSIS — Z12.31 ENCOUNTER FOR SCREENING MAMMOGRAM FOR BREAST CANCER: ICD-10-CM

## 2017-09-21 NOTE — TELEPHONE ENCOUNTER
Book the a1c and Mammogram Wednesday.     Put in the referral to DR. Yuan at her request for obesity.      I have signed for the following orders AND/OR meds.  Please call the patient and ask the patient to schedule the testing AND/OR inform about any medications that were sent.      Orders Placed This Encounter   Procedures    Mammo Digital Screening Bilat with CAD     Standing Status:   Future     Standing Expiration Date:   9/21/2018     Order Specific Question:   Reason for Exam:     Answer:   screening     Order Specific Question:   Is the patient pregnant?     Answer:   No    Hemoglobin A1c     Standing Status:   Future     Standing Expiration Date:   9/21/2018    Ambulatory consult to General Surgery     Referral Priority:   Routine     Referral Type:   Consultation     Referral Reason:   Specialty Services Required     Requested Specialty:   Surgery     Number of Visits Requested:   1

## 2017-10-02 ENCOUNTER — PATIENT MESSAGE (OUTPATIENT)
Dept: FAMILY MEDICINE | Facility: CLINIC | Age: 57
End: 2017-10-02

## 2017-10-02 ENCOUNTER — PATIENT MESSAGE (OUTPATIENT)
Dept: ADMINISTRATIVE | Facility: HOSPITAL | Age: 57
End: 2017-10-02

## 2017-10-03 ENCOUNTER — LAB VISIT (OUTPATIENT)
Dept: LAB | Facility: HOSPITAL | Age: 57
End: 2017-10-03
Attending: FAMILY MEDICINE
Payer: COMMERCIAL

## 2017-10-03 DIAGNOSIS — E11.9 TYPE 2 DIABETES MELLITUS WITHOUT COMPLICATION, UNSPECIFIED LONG TERM INSULIN USE STATUS: ICD-10-CM

## 2017-10-03 PROCEDURE — 83036 HEMOGLOBIN GLYCOSYLATED A1C: CPT

## 2017-10-03 PROCEDURE — 36415 COLL VENOUS BLD VENIPUNCTURE: CPT | Mod: PO

## 2017-10-04 LAB
ESTIMATED AVG GLUCOSE: 186 MG/DL
HBA1C MFR BLD HPLC: 8.1 %

## 2017-10-05 ENCOUNTER — TELEPHONE (OUTPATIENT)
Dept: FAMILY MEDICINE | Facility: CLINIC | Age: 57
End: 2017-10-05

## 2017-10-05 NOTE — TELEPHONE ENCOUNTER
Pt called saying you had asked her to have a urine protein. She wants to get all of her testing done the same day. I see the other orders, please put in the urine protein if she needs this and she can do that at the same time she comes for her

## 2017-10-05 NOTE — TELEPHONE ENCOUNTER
Pt is coming Tuesday 10/10 for mammo and flu shot. Any lab ordered can be scheduled for that day as well.

## 2017-10-06 ENCOUNTER — PATIENT MESSAGE (OUTPATIENT)
Dept: FAMILY MEDICINE | Facility: CLINIC | Age: 57
End: 2017-10-06

## 2017-10-06 NOTE — TELEPHONE ENCOUNTER
Do this on 10/10 when she comes for her mammogram and flu shot. I have signed for the following orders AND/OR meds.  Please call the patient and ask the patient to schedule the testing AND/OR inform about any medications that were sent.      Orders Placed This Encounter   Procedures    Protein / creatinine ratio, urine     Standing Status:   Future     Standing Expiration Date:   10/5/2018     Order Specific Question:   Specimen Source     Answer:   Urine

## 2017-10-11 ENCOUNTER — CLINICAL SUPPORT (OUTPATIENT)
Dept: SMOKING CESSATION | Facility: CLINIC | Age: 57
End: 2017-10-11
Payer: COMMERCIAL

## 2017-10-11 DIAGNOSIS — F17.210 CIGARETTE NICOTINE DEPENDENCE, UNCOMPLICATED: Primary | ICD-10-CM

## 2017-10-11 PROCEDURE — 99407 BEHAV CHNG SMOKING > 10 MIN: CPT | Mod: S$GLB,,,

## 2017-10-11 RX ORDER — VARENICLINE TARTRATE 1 MG/1
1 TABLET, FILM COATED ORAL 2 TIMES DAILY
Qty: 56 TABLET | Refills: 0 | Status: SHIPPED | OUTPATIENT
Start: 2017-10-11 | End: 2018-07-30 | Stop reason: ALTCHOICE

## 2017-10-12 ENCOUNTER — PATIENT MESSAGE (OUTPATIENT)
Dept: ADMINISTRATIVE | Facility: HOSPITAL | Age: 57
End: 2017-10-12

## 2017-10-12 RX ORDER — INSULIN GLARGINE 100 [IU]/ML
70 INJECTION, SOLUTION SUBCUTANEOUS EVERY MORNING
Qty: 30 ML | Refills: 11 | Status: SHIPPED | OUTPATIENT
Start: 2017-10-12 | End: 2018-06-05 | Stop reason: SDUPTHER

## 2017-10-12 NOTE — TELEPHONE ENCOUNTER
I have signed for the following orders AND/OR meds.  Please call the patient and ask the patient to schedule the testing AND/OR inform about any medications that were sent.      No orders of the defined types were placed in this encounter.        Medications Ordered This Encounter      insulin glargine (LANTUS SOLOSTAR) 100 unit/mL (3 mL) InPn pen          Sig: Inject 70 Units into the skin every morning.          Dispense:  30 mL          Refill:  11

## 2017-10-23 ENCOUNTER — CLINICAL SUPPORT (OUTPATIENT)
Dept: SMOKING CESSATION | Facility: CLINIC | Age: 57
End: 2017-10-23
Payer: COMMERCIAL

## 2017-10-23 DIAGNOSIS — F17.210 CIGARETTE NICOTINE DEPENDENCE, UNCOMPLICATED: Primary | ICD-10-CM

## 2017-10-23 PROCEDURE — 99404 PREV MED CNSL INDIV APPRX 60: CPT | Mod: S$GLB,,,

## 2017-10-26 ENCOUNTER — HOSPITAL ENCOUNTER (OUTPATIENT)
Dept: RADIOLOGY | Facility: HOSPITAL | Age: 57
Discharge: HOME | End: 2017-10-26
Attending: FAMILY MEDICINE
Payer: COMMERCIAL

## 2017-10-26 VITALS — HEIGHT: 68 IN | BODY MASS INDEX: 44.27 KG/M2 | WEIGHT: 292.13 LBS

## 2017-10-26 DIAGNOSIS — Z12.31 ENCOUNTER FOR SCREENING MAMMOGRAM FOR BREAST CANCER: ICD-10-CM

## 2017-10-26 PROCEDURE — 77067 SCR MAMMO BI INCL CAD: CPT | Mod: 26,,, | Performed by: RADIOLOGY

## 2017-10-26 PROCEDURE — 77067 SCR MAMMO BI INCL CAD: CPT | Mod: TC

## 2017-11-20 ENCOUNTER — TELEPHONE (OUTPATIENT)
Dept: SMOKING CESSATION | Facility: CLINIC | Age: 57
End: 2017-11-20

## 2017-11-20 NOTE — TELEPHONE ENCOUNTER
Attempted to contact patient to follow up tobacco cessation. I was able to leave a detailed message with the following information: Diane Miller, [facility] Tobacco Cessation program and my phone number [phone]. I requested a return call.

## 2017-12-06 RX ORDER — ATORVASTATIN CALCIUM 80 MG/1
TABLET, FILM COATED ORAL
Qty: 30 TABLET | Refills: 0 | Status: SHIPPED | OUTPATIENT
Start: 2017-12-06 | End: 2018-01-09 | Stop reason: SDUPTHER

## 2018-01-08 ENCOUNTER — TELEPHONE (OUTPATIENT)
Dept: FAMILY MEDICINE | Facility: CLINIC | Age: 58
End: 2018-01-08

## 2018-01-08 DIAGNOSIS — M25.562 ACUTE PAIN OF LEFT KNEE: Primary | ICD-10-CM

## 2018-01-08 NOTE — TELEPHONE ENCOUNTER
----- Message from Renata Byrd sent at 1/8/2018 10:29 AM CST -----  Contact: Pt  Pt called and stated she needed to speak to the nurse. She stated that she needs to get an order for an xray for her left knee. She can be reached at [phone].    Thanks,  Tf

## 2018-01-08 NOTE — TELEPHONE ENCOUNTER
I have signed for the following orders AND/OR meds.  Please call the patient and ask the patient to schedule the testing AND/OR inform about any medications that were sent.      Orders Placed This Encounter   Procedures    X-Ray Knee 3 View Left     Standing Status:   Future     Standing Expiration Date:   1/8/2019     Order Specific Question:   May the Radiologist modify the order per protocol to meet the clinical needs of the patient?     Answer:   Yes

## 2018-01-09 RX ORDER — ATORVASTATIN CALCIUM 80 MG/1
TABLET, FILM COATED ORAL
Qty: 30 TABLET | Refills: 0 | Status: SHIPPED | OUTPATIENT
Start: 2018-01-09 | End: 2018-05-11 | Stop reason: SDUPTHER

## 2018-01-09 NOTE — TELEPHONE ENCOUNTER
Spoke w/pt to schedule Xray. Pt states she was told she could just come in whenever she wanted to get her Xray. Explained to pt that she would need to schedule the appointment and that I would look at the day and time that she wanted to see if it was available. Pt was very abrupt and states she doesn't have time for this. States that her knee is feeling better and just cancel the order.

## 2018-01-12 DIAGNOSIS — E11.9 TYPE 2 DIABETES MELLITUS WITHOUT COMPLICATION: ICD-10-CM

## 2018-03-26 ENCOUNTER — PATIENT MESSAGE (OUTPATIENT)
Dept: FAMILY MEDICINE | Facility: CLINIC | Age: 58
End: 2018-03-26

## 2018-03-26 DIAGNOSIS — E11.69 HYPERLIPIDEMIA ASSOCIATED WITH TYPE 2 DIABETES MELLITUS: Primary | ICD-10-CM

## 2018-03-26 DIAGNOSIS — E78.5 HYPERLIPIDEMIA ASSOCIATED WITH TYPE 2 DIABETES MELLITUS: Primary | ICD-10-CM

## 2018-03-26 NOTE — TELEPHONE ENCOUNTER
I have signed for the following orders AND/OR meds.  Please call the patient and ask the patient to schedule the testing AND/OR inform about any medications that were sent.      Orders Placed This Encounter   Procedures    Hemoglobin A1c     Standing Status:   Future     Standing Expiration Date:   3/26/2019    Lipid panel     Standing Status:   Future     Standing Expiration Date:   3/26/2019    Comprehensive metabolic panel     Standing Status:   Future     Standing Expiration Date:   3/26/2019

## 2018-05-11 RX ORDER — ATORVASTATIN CALCIUM 80 MG/1
80 TABLET, FILM COATED ORAL DAILY
Qty: 30 TABLET | Refills: 0 | Status: SHIPPED | OUTPATIENT
Start: 2018-05-11 | End: 2018-05-15 | Stop reason: SDUPTHER

## 2018-05-11 NOTE — TELEPHONE ENCOUNTER
Health Maintenance Due   Topic Date Due    Hemoglobin A1c  01/03/2018    Lipid Panel  02/14/2018       Get an a1c, cmp and lipid.

## 2018-05-15 RX ORDER — ATORVASTATIN CALCIUM 80 MG/1
80 TABLET, FILM COATED ORAL DAILY
Qty: 90 TABLET | Refills: 0 | Status: SHIPPED | OUTPATIENT
Start: 2018-05-15 | End: 2018-09-10

## 2018-05-15 NOTE — TELEPHONE ENCOUNTER
I have refilled the patient's requested medication x 1 month.  However, the patient is due for an evaluation in the office.  Call the patient on the phone and book the patient with EITHER Sara Barraza NP or Pb Cobos NP for a visit.    PLEASE DOCUMENT THE FACT THAT YOU HAVE CONTACTED THE PATIENT IN THE CHART FOR FUTURE REFERENCE.    Health Maintenance Due   Topic Date Due    Hemoglobin A1c  01/03/2018    Lipid Panel  02/14/2018

## 2018-05-17 ENCOUNTER — PATIENT MESSAGE (OUTPATIENT)
Dept: FAMILY MEDICINE | Facility: CLINIC | Age: 58
End: 2018-05-17

## 2018-06-01 ENCOUNTER — LAB VISIT (OUTPATIENT)
Dept: LAB | Facility: HOSPITAL | Age: 58
End: 2018-06-01
Attending: FAMILY MEDICINE
Payer: COMMERCIAL

## 2018-06-01 DIAGNOSIS — E11.69 HYPERLIPIDEMIA ASSOCIATED WITH TYPE 2 DIABETES MELLITUS: ICD-10-CM

## 2018-06-01 DIAGNOSIS — E78.5 HYPERLIPIDEMIA ASSOCIATED WITH TYPE 2 DIABETES MELLITUS: ICD-10-CM

## 2018-06-01 LAB
ALBUMIN SERPL BCP-MCNC: 3.2 G/DL
ALP SERPL-CCNC: 83 U/L
ALT SERPL W/O P-5'-P-CCNC: 14 U/L
ANION GAP SERPL CALC-SCNC: 5 MMOL/L
AST SERPL-CCNC: 16 U/L
BILIRUB SERPL-MCNC: 0.5 MG/DL
BUN SERPL-MCNC: 14 MG/DL
CALCIUM SERPL-MCNC: 9 MG/DL
CHLORIDE SERPL-SCNC: 107 MMOL/L
CHOLEST SERPL-MCNC: 172 MG/DL
CHOLEST/HDLC SERPL: 2.9 {RATIO}
CO2 SERPL-SCNC: 31 MMOL/L
CREAT SERPL-MCNC: 0.7 MG/DL
EST. GFR  (AFRICAN AMERICAN): >60 ML/MIN/1.73 M^2
EST. GFR  (NON AFRICAN AMERICAN): >60 ML/MIN/1.73 M^2
ESTIMATED AVG GLUCOSE: 203 MG/DL
GLUCOSE SERPL-MCNC: 96 MG/DL
HBA1C MFR BLD HPLC: 8.7 %
HDLC SERPL-MCNC: 59 MG/DL
HDLC SERPL: 34.3 %
LDLC SERPL CALC-MCNC: 96.8 MG/DL
NONHDLC SERPL-MCNC: 113 MG/DL
POTASSIUM SERPL-SCNC: 4.7 MMOL/L
PROT SERPL-MCNC: 6.7 G/DL
SODIUM SERPL-SCNC: 143 MMOL/L
TRIGL SERPL-MCNC: 81 MG/DL

## 2018-06-01 PROCEDURE — 83036 HEMOGLOBIN GLYCOSYLATED A1C: CPT

## 2018-06-01 PROCEDURE — 80053 COMPREHEN METABOLIC PANEL: CPT

## 2018-06-01 PROCEDURE — 80061 LIPID PANEL: CPT

## 2018-06-01 PROCEDURE — 36415 COLL VENOUS BLD VENIPUNCTURE: CPT | Mod: PO

## 2018-06-02 NOTE — PROGRESS NOTES
The cholesterol is in control.  Continue current treatment for this.   The a1c is too high.   Increase the lantus from 70 u a day to 80 u a day and increase the humalog sliding scale to the following:  Sig: Give the following sliding scale three times a day before meals based on pre-meal glucose check:   If less than 150, give 3 unit SQ.     If 151-200, give 4 units SQ   If 201-250, give 5 units SQ   If 251-300, give 6 units SQ   If 301-350, give 7 units SQ   If 351-400, give 8 units SQ   If greater than 400, call MD.    Recheck the a1c in 3 months.     Schedule for a follow up with my NP in 2 weeks with the glucose log to address any changes that may need to be done.

## 2018-06-05 ENCOUNTER — PATIENT OUTREACH (OUTPATIENT)
Dept: ADMINISTRATIVE | Facility: HOSPITAL | Age: 58
End: 2018-06-05

## 2018-06-05 ENCOUNTER — TELEPHONE (OUTPATIENT)
Dept: FAMILY MEDICINE | Facility: CLINIC | Age: 58
End: 2018-06-05

## 2018-06-05 NOTE — PROGRESS NOTES
I have attempted without success to contact pt to schedule appointment. no answer Left VM. (1st Attempt)

## 2018-06-05 NOTE — TELEPHONE ENCOUNTER
----- Message from Nelly Saleh sent at 6/5/2018 11:28 AM CDT -----  Contact: self/[phone]  Returning call, please call back at [phone]. Thanks/ar

## 2018-06-05 NOTE — TELEPHONE ENCOUNTER
----- Message from Yasir Emmanuel MD sent at 6/2/2018  7:59 AM CDT -----  The cholesterol is in control.  Continue current treatment for this.   The a1c is too high.   Increase the lantus from 70 u a day to 80 u a day and increase the humalog sliding scale to the following:  Sig: Give the following sliding scale three times a day before meals based on pre-meal glucose check:   If less than 150, give 3 unit SQ.     If 151-200, give 4 units SQ   If 201-250, give 5 units SQ   If 251-300, give 6 units SQ   If 301-350, give 7 units SQ   If 351-400, give 8 units SQ   If greater than 400, call MD.    Recheck the a1c in 3 months.     Schedule for a follow up with my NP in 2 weeks with the glucose log to address any changes that may need to be done.

## 2018-06-06 RX ORDER — INSULIN GLARGINE 100 [IU]/ML
80 INJECTION, SOLUTION SUBCUTANEOUS EVERY MORNING
Qty: 24 ML | Refills: 11 | Status: SHIPPED | OUTPATIENT
Start: 2018-06-06 | End: 2018-12-13 | Stop reason: SDUPTHER

## 2018-06-06 RX ORDER — INSULIN LISPRO 100 [IU]/ML
INJECTION, SOLUTION INTRAVENOUS; SUBCUTANEOUS
Qty: 1 BOX | Refills: 11 | Status: SHIPPED | OUTPATIENT
Start: 2018-06-06 | End: 2018-07-13 | Stop reason: SDUPTHER

## 2018-06-19 ENCOUNTER — CLINICAL SUPPORT (OUTPATIENT)
Dept: SMOKING CESSATION | Facility: CLINIC | Age: 58
End: 2018-06-19
Payer: COMMERCIAL

## 2018-06-19 DIAGNOSIS — F17.200 NICOTINE DEPENDENCE: Primary | ICD-10-CM

## 2018-06-19 PROCEDURE — 99407 BEHAV CHNG SMOKING > 10 MIN: CPT | Mod: S$GLB,,, | Performed by: INTERNAL MEDICINE

## 2018-06-19 NOTE — PROGRESS NOTES
Spoke with patient today in regard to smoking cessation progress for 3/6 month follow up, she states not tobacco free att his time. Patient has scheduled an appointment to return to the program for Quit attempt #3. Informed patient of benefit period, future follow ups, and contact information. Will resolve episode and complete smart form for Quit attempt #1 and 2.

## 2018-07-16 RX ORDER — INSULIN LISPRO 100 [IU]/ML
INJECTION, SOLUTION INTRAVENOUS; SUBCUTANEOUS
Qty: 1 BOX | Refills: 11 | Status: SHIPPED | OUTPATIENT
Start: 2018-07-16 | End: 2018-08-06

## 2018-07-30 ENCOUNTER — CLINICAL SUPPORT (OUTPATIENT)
Dept: SMOKING CESSATION | Facility: CLINIC | Age: 58
End: 2018-07-30
Payer: COMMERCIAL

## 2018-07-30 DIAGNOSIS — F17.210 HEAVY CIGARETTE SMOKER (20-39 PER DAY): Primary | ICD-10-CM

## 2018-07-30 PROCEDURE — 99404 PREV MED CNSL INDIV APPRX 60: CPT | Mod: S$GLB,,,

## 2018-07-30 PROCEDURE — 99999 PR PBB SHADOW E&M-EST. PATIENT-LVL I: CPT | Mod: PBBFAC,,,

## 2018-07-30 RX ORDER — IBUPROFEN 200 MG
1 TABLET ORAL DAILY
Qty: 28 PATCH | Refills: 0 | Status: SHIPPED | OUTPATIENT
Start: 2018-07-30 | End: 2018-09-10

## 2018-07-30 RX ORDER — VARENICLINE TARTRATE 1 MG/1
1 TABLET, FILM COATED ORAL 2 TIMES DAILY
Qty: 58 TABLET | Refills: 0 | Status: SHIPPED | OUTPATIENT
Start: 2018-07-30 | End: 2018-09-25 | Stop reason: SDUPTHER

## 2018-07-30 NOTE — Clinical Note
Patient seen for the tobacco cessation program. This is her third attempt to quit smoking through our program. She and her spouse, Rodrigo are attempting to quit smoking together. She is a cigarette smoker of 1.5  PPD X 37 years. She is motivated to quit the use of tobacco and has agreed to attend our 6 week tobacco cessation program. Patient called her brother, Omar López who has current benefits through the [employer] and encouraged him to attend group meetings with she and her spouse.

## 2018-08-06 ENCOUNTER — OFFICE VISIT (OUTPATIENT)
Dept: FAMILY MEDICINE | Facility: CLINIC | Age: 58
End: 2018-08-06
Payer: COMMERCIAL

## 2018-08-06 VITALS
BODY MASS INDEX: 44.31 KG/M2 | DIASTOLIC BLOOD PRESSURE: 70 MMHG | TEMPERATURE: 98 F | HEART RATE: 70 BPM | WEIGHT: 292.38 LBS | SYSTOLIC BLOOD PRESSURE: 127 MMHG | HEIGHT: 68 IN

## 2018-08-06 DIAGNOSIS — M72.2 PLANTAR FASCIITIS: ICD-10-CM

## 2018-08-06 DIAGNOSIS — L57.0 ACTINIC KERATOSIS: ICD-10-CM

## 2018-08-06 PROCEDURE — 17000 DESTRUCT PREMALG LESION: CPT | Mod: S$GLB,,, | Performed by: FAMILY MEDICINE

## 2018-08-06 PROCEDURE — 3074F SYST BP LT 130 MM HG: CPT | Mod: CPTII,S$GLB,, | Performed by: FAMILY MEDICINE

## 2018-08-06 PROCEDURE — 3078F DIAST BP <80 MM HG: CPT | Mod: CPTII,S$GLB,, | Performed by: FAMILY MEDICINE

## 2018-08-06 PROCEDURE — 3008F BODY MASS INDEX DOCD: CPT | Mod: CPTII,S$GLB,, | Performed by: FAMILY MEDICINE

## 2018-08-06 PROCEDURE — 99214 OFFICE O/P EST MOD 30 MIN: CPT | Mod: 25,S$GLB,, | Performed by: FAMILY MEDICINE

## 2018-08-06 PROCEDURE — 99999 PR PBB SHADOW E&M-EST. PATIENT-LVL III: CPT | Mod: PBBFAC,,, | Performed by: FAMILY MEDICINE

## 2018-08-06 PROCEDURE — 3045F PR MOST RECENT HEMOGLOBIN A1C LEVEL 7.0-9.0%: CPT | Mod: CPTII,S$GLB,, | Performed by: FAMILY MEDICINE

## 2018-08-06 RX ORDER — INSULIN ASPART 100 [IU]/ML
INJECTION, SOLUTION INTRAVENOUS; SUBCUTANEOUS
Qty: 15 ML | Refills: 11 | Status: SHIPPED | OUTPATIENT
Start: 2018-08-06 | End: 2019-10-28 | Stop reason: SDUPTHER

## 2018-08-06 NOTE — PROGRESS NOTES
Subjective:      Patient ID: Chelsie Garrett is a 58 y.o. female.    Chief Complaint: Rash    Problem List Items Addressed This Visit     Actinic keratosis    DM (diabetes mellitus) type II uncontrolled with eye manifestation - Primary    Plantar fasciitis        Patient a lesion her head that has been present for last several.  It does or draining red or tender but she is concerned it being needs to be she has this before.  Makes it worse.  Patient has diabetes and she is currently in control.  The patient presents with diabetes.  The patient denies polyuria, polydipsia, polyphagia, hypoglycemia and paresthesias.  The patient's glucose control has been good.  Home glucose averages are routinely checked.  The patient is without retinopathy currently.  The patient has no history of neuropathy.  The patient currently complains of no podiatric problems.  The patient has excellent compliance.  Hemoglobin A1C   Date Value Ref Range Status   06/01/2018 8.7 (H) 4.0 - 5.6 % Final     Comment:     ADA Screening Guidelines:  5.7-6.4%  Consistent with prediabetes  >or=6.5%  Consistent with diabetes  High levels of fetal hemoglobin interfere with the HbA1C  assay. Heterozygous hemoglobin variants (HbS, HgC, etc)do  not significantly interfere with this assay.   However, presence of multiple variants may affect accuracy.     10/03/2017 8.1 (H) 4.0 - 5.6 % Final     Comment:     According to ADA guidelines, hemoglobin A1c <7.0% represents  optimal control in non-pregnant diabetic patients. Different  metrics may apply to specific patient populations.   Standards of Medical Care in Diabetes-2016.  For the purpose of screening for the presence of diabetes:  <5.7%     Consistent with the absence of diabetes  5.7-6.4%  Consistent with increasing risk for diabetes   (prediabetes)  >or=6.5%  Consistent with diabetes  Currently, no consensus exists for use of hemoglobin A1c  for diagnosis of diabetes for children.  This Hemoglobin A1c  assay has significant interference with fetal   hemoglobin   (HbF). The results are invalid for patients with abnormal amounts of   HbF,   including those with known Hereditary Persistence   of Fetal Hemoglobin. Heterozygous hemoglobin variants (HbAS, HbAC,   HbAD, HbAE, HbA2) do not significantly interfere with this assay;   however, presence of multiple variants in a sample may impact the %   interference.     05/04/2017 8.8 (H) 4.5 - 6.2 % Final     Comment:     According to ADA guidelines, hemoglobin A1C <7.0% represents  optimal control in non-pregnant diabetic patients.  Different  metrics may apply to specific populations.   Standards of Medical Care in Diabetes - 2016.  For the purpose of screening for the presence of diabetes:  <5.7%     Consistent with the absence of diabetes  5.7-6.4%  Consistent with increasing risk for diabetes   (prediabetes)  >or=6.5%  Consistent with diabetes  Currently no consensus exists for use of hemoglobin A1C  for diagnosis of diabetes for children.       No results found for: JANELLE, [id number]  Diabetes Management Status    Statin: Taking  ACE/ARB: Taking    Screening or Prevention Patient's value Goal Complete/Controlled?   HgA1C Testing and Control   Lab Results   Component Value Date    HGBA1C 8.7 (H) 06/01/2018      Annually/Less than 8% No   Lipid profile : 06/01/2018 Annually Yes   LDL control Lab Results   Component Value Date    LDLCALC 96.8 06/01/2018    Annually/Less than 100 mg/dl  No   Nephropathy screening Lab Results   Component Value Date    LABMICR 37.0 05/15/2015     No results found for: PROTEINUA Annually No   Blood pressure BP Readings from Last 1 Encounters:   08/06/18 127/70    Less than 140/90 Yes   Dilated retinal exam : 10/04/2017 Annually Yes   Foot exam   : 08/06/2018 Annually Yes     She also needed her foot exam which was done today.    The patient complains of pain and stiffness of the Bilateral heel(s).  The patient denies pain radiation.   "The symptoms are exacerbated by resting for awhile and is worse when getting up to walk.  The patient also complains that it is bad early in the morning when first getting out of bed.  It usually gets better as a day goes on.  No swelling has been noted.  No trauma has been noted.  The patient does stand alot.  The patient does not wear good shoes.  Treatment to date has included nothing without improvement.    Past Medical History:  Past Medical History:   Diagnosis Date    Cigarette nicotine dependence without complication 2017    DM (diabetes mellitus) type II uncontrolled with eye manifestation     Nicotine addiction      Past Surgical History:   Procedure Laterality Date     SECTION       Review of patient's allergies indicates:   Allergen Reactions    Penicillins Anaphylaxis     Current Outpatient Prescriptions on File Prior to Visit   Medication Sig Dispense Refill    brimonidine-timolol (COMBIGAN) 0.2-0.5 % Drop Place 1 drop into the right eye 2 (two) times daily. 10 mL 0    insulin glargine (LANTUS SOLOSTAR U-100 INSULIN) 100 unit/mL (3 mL) InPn pen Inject 80 Units into the skin every morning. 24 mL 11    insulin needles, disposable, (NOVOFINE 32) 32 x 1/4 " Ndle by Misc.(Non-Drug; Combo Route) route.      lisinopril (PRINIVIL,ZESTRIL) 2.5 MG tablet TAKE ONE TABLET BY MOUTH ONCE DAILY 30 tablet 11    nicotine (NICODERM CQ) 21 mg/24 hr Place 1 patch onto the skin once daily. 28 patch 0    varenicline (CHANTIX) 1 mg Tab Take 1 tablet (1 mg total) by mouth 2 (two) times daily. 58 tablet 0    albuterol 90 mcg/actuation inhaler Inhale 2 puffs into the lungs every 6 (six) hours as needed for Wheezing. 18 g 1    atorvastatin (LIPITOR) 80 MG tablet Take 1 tablet (80 mg total) by mouth once daily. 90 tablet 0    azelastine (ASTELIN) 137 mcg (0.1 %) nasal spray 2 sprays (274 mcg total) by Nasal route 2 (two) times daily. 30 mL 2    blood sugar diagnostic (CONTOUR TEST STRIPS) Strp Use " daily as directed 100 each 11    prednisoLONE acetate (PRED FORTE) 1 % DrpS       [DISCONTINUED] insulin lispro (HUMALOG KWIKPEN) 100 unit/mL InPn pen Sig: Give the following sliding scale three times a day before meals based on pre-meal glucose check:   If less than 150, give 3 unit SQ.     If 151-200, give 4 units SQ   If 201-250, give 5 units SQ   If 251-300, give 6 units SQ   If 301-350, give 7 units SQ   If 351-400, give 8 units SQ   If greater than 400, call MD. [address]     No current facility-administered medications on file prior to visit.      Social History     Social History    Marital status:      Spouse name: N/A    Number of children: N/A    Years of education: N/A     Occupational History     [employer]     Social History Main Topics    Smoking status: Current Every Day Smoker     Packs/day: 1.50     Years: 37.00     Types: Cigarettes    Smokeless tobacco: Never Used      Comment: Quit smoking 10/5/17. 7/30/18 Lapsed.    Alcohol use Yes    Drug use: No    Sexual activity: Not on file     Other Topics Concern    Not on file     Social History Narrative    No narrative on file     Family History   Problem Relation Age of Onset    Cancer Mother         pancreatic    Cancer Father         lung    Stroke Father     Coronary artery disease Father     Diabetes Father     Hypertension Father     Bladder Cancer Paternal Uncle     Lymphoma Paternal Uncle        Review of Systems   Constitutional: Negative for fatigue, fever and unexpected weight change.   HENT: Negative for congestion, ear pain, postnasal drip and sore throat.    Eyes: Negative for visual disturbance.   Respiratory: Negative for cough, chest tightness, shortness of breath and wheezing.    Cardiovascular: Negative for chest pain, palpitations and leg swelling.   Gastrointestinal: Negative for abdominal pain, blood in stool, constipation, diarrhea, nausea and vomiting.   Genitourinary: Negative for  "dysuria and hematuria.   Musculoskeletal: Positive for arthralgias.   Skin:        She has a new lesion.   Neurological: Negative for weakness and numbness.       Objective:     /70   Pulse 70   Temp 97.9 °F (36.6 °C) (Oral)   Ht 5' 8" (1.727 m)   Wt 132.6 kg (292 lb 6.4 oz)   BMI 44.46 kg/m²     Physical Exam   Constitutional: She appears well-developed and well-nourished. She is cooperative.   HENT:   Head: Normocephalic and atraumatic.   Right Ear: Tympanic membrane, external ear and ear canal normal.   Left Ear: Tympanic membrane, external ear and ear canal normal.   Nose: Nose normal.   Mouth/Throat: Uvula is midline and mucous membranes are normal. No oral lesions. No oropharyngeal exudate, posterior oropharyngeal edema or posterior oropharyngeal erythema.   Eyes: EOM and lids are normal. Pupils are equal, round, and reactive to light. Right eye exhibits no discharge. Left eye exhibits no discharge. Right conjunctiva is not injected. Right conjunctiva has no hemorrhage. Left conjunctiva is not injected. Left conjunctiva has no hemorrhage. No scleral icterus. Right eye exhibits no nystagmus. Left eye exhibits no nystagmus.   Neck: Normal range of motion and full passive range of motion without pain. Neck supple. No JVD present. No tracheal tenderness present. Carotid bruit is not present. No tracheal deviation present. No thyroid mass and no thyromegaly present.   Cardiovascular: Normal rate, regular rhythm, S1 normal and S2 normal.   No murmur heard.  Pulses:       Carotid pulses are 2+ on the right side, and 2+ on the left side.       Radial pulses are 2+ on the right side, and 2+ on the left side.        Dorsalis pedis pulses are 2+ on the right side, and 2+ on the left side.        Posterior tibial pulses are 2+ on the right side, and 2+ on the left side.   Pulmonary/Chest: Effort normal and breath sounds normal. No respiratory distress. She has no wheezes. She has no rhonchi. She has no rales. "   Abdominal: Soft. Normal appearance, normal aorta and bowel sounds are normal. She exhibits no distension, no abdominal bruit, no pulsatile midline mass and no mass. There is no hepatosplenomegaly. There is no tenderness. There is no rebound.   Musculoskeletal:        Right knee: She exhibits no swelling. No tenderness found.        Left knee: She exhibits no swelling. No tenderness found.        Right foot: There is normal range of motion and no deformity.        Left foot: There is normal range of motion and no deformity.        Feet:    The patient has pain on palpation of the heel area and along the plantar fascia.  There is no erythema or warmth noted in this area and there are no skin changes.  There is no swelling noted.       Feet:   Right Foot:   Protective Sensation: 10 sites tested. 10 sites sensed.   Skin Integrity: Positive for callus. Negative for ulcer, blister, skin breakdown, erythema, warmth or dry skin.   Left Foot:   Protective Sensation: 10 sites tested. 10 sites sensed.   Skin Integrity: Positive for callus. Negative for ulcer, blister, skin breakdown, erythema, warmth or dry skin.   Lymphadenopathy:        Head (right side): No submental and no submandibular adenopathy present.        Head (left side): No submental and no submandibular adenopathy present.     She has no cervical adenopathy.   Neurological: She is alert. She has normal strength. No cranial nerve deficit or sensory deficit.   Skin: Skin is warm and dry. No rash noted. No cyanosis. Nails show no clubbing.        She has a small scaly lesion on the forehead consistent with an AK.   Psychiatric: She has a normal mood and affect. Her speech is normal and behavior is normal. Thought content normal. Cognition and memory are normal.     Assessment:     1. 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 Plantar  fasciitis    3. Actinic keratosis        Plan:     Problem List Items Addressed This Visit     Actinic keratosis    DM (diabetes mellitus) type II uncontrolled with eye manifestation - Primary    Plantar fasciitis      Chelsie was seen today for rash.    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   If 151-200, give 4 units SQ If 201-250, give 5 units SQ If 251-300, give 6 units SQ If 301-350, give 7 units SQ If 351-400, give 8 units SQ If greater than 400, call MD.,      Liquid nitrogen was applied for 10-12 seconds to the skin lesions and the expected blistering or scabbing reaction explained. Do not pick at the areas. Patient reminded to expect hypopigmented scars from the procedure. Return if lesions fail to fully resolve.    Use IBUPROFEN 800 mg po tid and use stretching exercises.  Start using heel inserts.  Decrease weight.  Use heel stretches twice a day.  Wear good shoes.    No Follow-up on file.

## 2018-08-08 ENCOUNTER — PATIENT MESSAGE (OUTPATIENT)
Dept: FAMILY MEDICINE | Facility: CLINIC | Age: 58
End: 2018-08-08

## 2018-08-27 ENCOUNTER — CLINICAL SUPPORT (OUTPATIENT)
Dept: SMOKING CESSATION | Facility: CLINIC | Age: 58
End: 2018-08-27
Payer: COMMERCIAL

## 2018-08-27 DIAGNOSIS — F17.200 NICOTINE DEPENDENCE: Primary | ICD-10-CM

## 2018-08-27 PROCEDURE — 90853 GROUP PSYCHOTHERAPY: CPT | Mod: S$GLB,,,

## 2018-08-27 PROCEDURE — 99999 PR PBB SHADOW E&M-EST. PATIENT-LVL I: CPT | Mod: PBBFAC,,,

## 2018-08-27 NOTE — Clinical Note
Patient was seen with her spouse, Rodrigo at her side for smoking cessation. She shared today was their quit date. She has a history of smoking 1.5 packs of cigarettes for 37 years.  The patient remains on the prescribed tobacco cessation medication regimen of 1 mg Chantix BID with a 21 mg nicotine patch QD without any negative side effects at this time.

## 2018-08-28 NOTE — PROGRESS NOTES
Smoking Cessation Group Session #5    Site: [facility]  Date:  8/27/18  Clinical Status of Patient: Outpatient   Length of Service and Code: 90 minutes - 67072   Number in Attendance: 4  Group Activities/Focus of Group:  completion of TCRS (Tobacco Cessation Rating Scale) reviewed strategies, habitual behavior, high risks situations, understanding urges and cravings, stress and relaxation with open discussion and additional interventions, Introduced lapses, relapses, understanding them and analyzing the situation of a lapse, conflict issues that may be linked to a lapse.     Target symptoms:  withdrawal and medication side effects             The following were rated moderate (3) to severe (4) on TCRS:       Moderate 3: none     Severe 4:   none  Patient's Response to Intervention:  Patient was seen with her spouse, Rodrigo at her side for smoking cessation. She shared today was their quit date. She has a history of smoking 1.5 packs of cigarettes for 37 years.  The patient remains on the prescribed tobacco cessation medication regimen of 1 mg Chantix BID with a 21 mg nicotine patch QD without any negative side effects at this time.     Progress Toward Goals and Other Mental Status Changes: The patient denies any abnormal behavioral or mental changes at this time.     Plan: The patient will continue with group therapy sessions and medication regimen prescribed with management by physician or by the [facility]essation [facility] Provider. Patient will inform Smoking Cessation Counselor of symptoms as rated high on TCRS. The patient will continue with group therapy sessions and medication monitoring by CTTS. Prescribed medication management will be by physician.     Return to Clinic: 1 week    Quit Date: 8/27/18   Planned Quit Date: QUIT SMOKING 8/27/18

## 2018-09-10 ENCOUNTER — OFFICE VISIT (OUTPATIENT)
Dept: FAMILY MEDICINE | Facility: CLINIC | Age: 58
End: 2018-09-10
Payer: COMMERCIAL

## 2018-09-10 VITALS
BODY MASS INDEX: 44.41 KG/M2 | HEIGHT: 68 IN | HEART RATE: 62 BPM | WEIGHT: 293 LBS | SYSTOLIC BLOOD PRESSURE: 118 MMHG | DIASTOLIC BLOOD PRESSURE: 66 MMHG | TEMPERATURE: 99 F

## 2018-09-10 DIAGNOSIS — L23.9 ALLERGIC DERMATITIS: ICD-10-CM

## 2018-09-10 PROCEDURE — 3008F BODY MASS INDEX DOCD: CPT | Mod: CPTII,S$GLB,, | Performed by: NURSE PRACTITIONER

## 2018-09-10 PROCEDURE — 99999 PR PBB SHADOW E&M-EST. PATIENT-LVL III: CPT | Mod: PBBFAC,,, | Performed by: NURSE PRACTITIONER

## 2018-09-10 PROCEDURE — 3078F DIAST BP <80 MM HG: CPT | Mod: CPTII,S$GLB,, | Performed by: NURSE PRACTITIONER

## 2018-09-10 PROCEDURE — 3045F PR MOST RECENT HEMOGLOBIN A1C LEVEL 7.0-9.0%: CPT | Mod: CPTII,S$GLB,, | Performed by: NURSE PRACTITIONER

## 2018-09-10 PROCEDURE — 3074F SYST BP LT 130 MM HG: CPT | Mod: CPTII,S$GLB,, | Performed by: NURSE PRACTITIONER

## 2018-09-10 PROCEDURE — 99214 OFFICE O/P EST MOD 30 MIN: CPT | Mod: S$GLB,,, | Performed by: NURSE PRACTITIONER

## 2018-09-10 RX ORDER — ATORVASTATIN CALCIUM 80 MG/1
80 TABLET, FILM COATED ORAL DAILY
Qty: 90 TABLET | Refills: 0 | Status: SHIPPED | OUTPATIENT
Start: 2018-09-10 | End: 2018-12-03 | Stop reason: SDUPTHER

## 2018-09-10 RX ORDER — TRIAMCINOLONE ACETONIDE 1 MG/G
CREAM TOPICAL 2 TIMES DAILY
Qty: 45 G | Refills: 0 | Status: SHIPPED | OUTPATIENT
Start: 2018-09-10 | End: 2019-08-13

## 2018-09-10 NOTE — PROGRESS NOTES
Subjective:       Patient ID: Chelsie Garrett is a 58 y.o. female.    Chief Complaint: Rash    Rash   The current episode started 1 to 4 weeks ago. The problem has been waxing and waning since onset. The affected locations include the left arm, left shoulder, right arm and right shoulder. The rash is characterized by itchiness. It is unknown if there was an exposure to a precipitant. Pertinent negatives include no cough, diarrhea, eye pain, fatigue, fever, shortness of breath, sore throat or vomiting.   Diabetes   She presents for her follow-up diabetic visit. She has type 2 diabetes mellitus. Her disease course has been stable. There are no hypoglycemic associated symptoms. Pertinent negatives for hypoglycemia include no dizziness, headaches or nervousness/anxiousness. There are no diabetic associated symptoms. Pertinent negatives for diabetes include no chest pain and no fatigue. Hypoglycemia complications include nocturnal hypoglycemia. Symptoms are stable. Risk factors for coronary artery disease include obesity, dyslipidemia and diabetes mellitus. Current diabetic treatment includes insulin injections. She is compliant with treatment most of the time. Her weight is stable. She is following a generally healthy diet. An ACE inhibitor/angiotensin II receptor blocker is being taken.   Medication Refill   This is a chronic (Lipitor) problem. The current episode started more than 1 year ago. The problem has been unchanged. Associated symptoms include a rash. Pertinent negatives include no abdominal pain, arthralgias, chest pain, coughing, fatigue, fever, headaches, myalgias, sore throat or vomiting.       Review of Systems   Constitutional: Negative for fatigue, fever and unexpected weight change.   HENT: Negative for ear pain and sore throat.    Eyes: Negative for pain and visual disturbance.   Respiratory: Negative for cough and shortness of breath.    Cardiovascular: Negative for chest pain and palpitations.  "  Gastrointestinal: Negative for abdominal pain, diarrhea and vomiting.   Musculoskeletal: Negative for arthralgias and myalgias.   Skin: Positive for rash. Negative for color change.   Neurological: Negative for dizziness and headaches.   Psychiatric/Behavioral: Negative for dysphoric mood and sleep disturbance. The patient is not nervous/anxious.        Vitals:    09/10/18 0816   BP: 118/66   Pulse: 62   Temp: 98.7 °F (37.1 °C)       Objective:     Current Outpatient Medications   Medication Sig Dispense Refill    blood sugar diagnostic (CONTOUR TEST STRIPS) Strp Use daily as directed 100 each 11    brimonidine-timolol (COMBIGAN) 0.2-0.5 % Drop Place 1 drop into the right eye 2 (two) times daily. 10 mL 0    insulin aspart U-100 (NOVOLOG) 100 unit/mL InPn pen Give the following sliding scale three times a day before meals based on pre-meal glucose check: If less than 150, give 3 unit SQ.   If 151-200, give 4 units SQ If 201-250, give 5 units SQ If 251-300, give 6 units SQ If 301-350, give 7 units SQ If 351-400, give 8 units SQ If greater than 400, call MD., 15 mL 11    insulin glargine (LANTUS SOLOSTAR U-100 INSULIN) 100 unit/mL (3 mL) InPn pen Inject 80 Units into the skin every morning. 24 mL 11    insulin needles, disposable, (NOVOFINE 32) 32 x 1/4 " Ndle by Misc.(Non-Drug; Combo Route) route.      lisinopril (PRINIVIL,ZESTRIL) 2.5 MG tablet TAKE ONE TABLET BY MOUTH ONCE DAILY 30 tablet 11    varenicline (CHANTIX) 1 mg Tab Take 1 tablet (1 mg total) by mouth 2 (two) times daily. 58 tablet 0    atorvastatin (LIPITOR) 80 MG tablet Take 1 tablet (80 mg total) by mouth once daily. 90 tablet 0    dulaglutide (TRULICITY) 0.75 mg/0.5 mL PnIj Inject 0.5 mLs (0.75 mg total) into the skin every 7 days. 4 Syringe 2    triamcinolone acetonide 0.1% (KENALOG) 0.1 % cream Apply topically 2 (two) times daily. for 10 days 45 g 0     No current facility-administered medications for this visit.        Physical Exam "   Constitutional: She is oriented to person, place, and time. She appears well-developed and well-nourished. No distress.   HENT:   Head: Normocephalic and atraumatic.   Eyes: EOM are normal. Pupils are equal, round, and reactive to light.   Neck: Normal range of motion. Neck supple.   Cardiovascular: Normal rate and regular rhythm.   Pulmonary/Chest: Effort normal and breath sounds normal.   Musculoskeletal: Normal range of motion.   Neurological: She is alert and oriented to person, place, and time.   Skin: Skin is warm and dry. Rash noted. Rash is urticarial.        Psychiatric: She has a normal mood and affect. Judgment normal.   Nursing note and vitals reviewed.      Assessment:       1. 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 for 10 days  Dispense: 45 g; Refill: 0  -     dulaglutide (TRULICITY) 0.75 mg/0.5 mL PnIj; Inject 0.5 mLs (0.75 mg total) into the skin every 7 days.  Dispense: 4 Syringe; Refill: 2  -     atorvastatin (LIPITOR) 80 MG tablet; Take 1 tablet (80 mg total) by mouth once daily.  Dispense: 90 tablet; Refill: 0        No Follow-up on file.    There are no Patient Instructions on file for this visit.

## 2018-09-12 ENCOUNTER — TELEPHONE (OUTPATIENT)
Dept: FAMILY MEDICINE | Facility: CLINIC | Age: 58
End: 2018-09-12

## 2018-09-12 NOTE — TELEPHONE ENCOUNTER
----- Message from Eve Sierra sent at 9/12/2018  1:57 PM CDT -----  Contact: [employer]   Caller needs call back rg prior authorization for patient..[phone]

## 2018-09-25 ENCOUNTER — CLINICAL SUPPORT (OUTPATIENT)
Dept: SMOKING CESSATION | Facility: CLINIC | Age: 58
End: 2018-09-25
Payer: COMMERCIAL

## 2018-09-25 DIAGNOSIS — F17.210 LIGHT CIGARETTE SMOKER (1-9 CIGS/DAY): Primary | ICD-10-CM

## 2018-09-25 PROCEDURE — 90853 GROUP PSYCHOTHERAPY: CPT | Mod: S$GLB,,,

## 2018-09-25 PROCEDURE — 99999 PR PBB SHADOW E&M-EST. PATIENT-LVL I: CPT | Mod: PBBFAC,,,

## 2018-09-25 RX ORDER — VARENICLINE TARTRATE 1 MG/1
1 TABLET, FILM COATED ORAL 2 TIMES DAILY
Qty: 58 TABLET | Refills: 0 | Status: SHIPPED | OUTPATIENT
Start: 2018-09-25 | End: 2019-08-13

## 2018-09-25 NOTE — PROGRESS NOTES
Smoking Cessation Group Session #4    Site: [facility]  Date:  9/25/2018  Clinical Status of Patient: Outpatient   Length of Service and Code: 90 minutes - 30188   Number in Attendance: 3  Group Activities/Focus of Group: completion of TCRS (Tobacco Cessation Rating Scale) reviewed strategies, habitual behavior, stress, and high risk situations. Introduced stress with addition interventions, SOLVE, relaxation with interventions, nutrition, exercise, weight gain, and the importance of rewarding oneself for accomplishments toward becoming tobacco free. Open discussion of all items with interventions.     Target symptoms:  withdrawal and medication side effects             The following were rated moderate (3) to severe (4) on TCRS:       Moderate 3: none     Severe 4:   none  Patient's Response to Intervention: Patient reports decreasing cigarette smoking from 1.5 for 37 years to 2 cigarettes per day. She is determined to stop smoking and has set a quit date of 1/15/18. The patient remains on the prescribed tobacco cessation medication regimen of 1 mg Chantix BID without any negative side effects at this time.     Progress Toward Goals and Other Mental Status Changes: The patient denies any abnormal behavioral or mental changes at this time.     Plan: The patient will continue with group therapy sessions and medication regimen prescribed with management by physician or by the [facility]ation [facility] Provider. Patient will inform Smoking Cessation Counselor of symptoms as rated high on TCRS.     Return to Clinic: 1 week    Quit Date: none   Planned Quit Date: 10/15/18

## 2018-09-25 NOTE — Clinical Note
Patient reports decreasing cigarette smoking from 1.5 for 37 years to 2 cigarettes per day. She is determined to stop smoking and has set a quit date of 1/15/18. The patient remains on the prescribed tobacco cessation medication regimen of 1 mg Chantix BID without any negative side effects at this time.

## 2018-11-01 ENCOUNTER — CLINICAL SUPPORT (OUTPATIENT)
Dept: SMOKING CESSATION | Facility: CLINIC | Age: 58
End: 2018-11-01
Payer: COMMERCIAL

## 2018-11-01 DIAGNOSIS — F17.200 NICOTINE DEPENDENCE: Primary | ICD-10-CM

## 2018-11-01 PROCEDURE — 99407 BEHAV CHNG SMOKING > 10 MIN: CPT | Mod: S$GLB,,,

## 2018-11-01 NOTE — PROGRESS NOTES
Called pt to f/u on her 12 month smoking cessation quit status for quit attempt #2. Pt stated she was tobacco free for about a week, but then relapsed. Informed patient of benefits available. Patient scheduled a follow up appointment for Monday 11/5/18. Will complete and resolve quit #2 episode and complete 3 month for quit #3 attempt. Informed patient of benefit period, phone follow ups, and contact information.

## 2018-12-01 DIAGNOSIS — Z12.39 SCREENING BREAST EXAMINATION: Primary | ICD-10-CM

## 2018-12-03 RX ORDER — LISINOPRIL 2.5 MG/1
TABLET ORAL
Qty: 30 TABLET | Refills: 11 | Status: SHIPPED | OUTPATIENT
Start: 2018-12-03 | End: 2019-12-05 | Stop reason: SDUPTHER

## 2018-12-03 RX ORDER — ATORVASTATIN CALCIUM 80 MG/1
TABLET, FILM COATED ORAL
Qty: 90 TABLET | Refills: 0 | Status: SHIPPED | OUTPATIENT
Start: 2018-12-03 | End: 2019-08-20 | Stop reason: SDUPTHER

## 2018-12-03 NOTE — TELEPHONE ENCOUNTER
She is due for the following:  Health Maintenance Due   Topic Date Due    Influenza Vaccine  08/01/2018    Mammogram  10/26/2018    Hemoglobin A1c  11/06/2018     please arrange.

## 2018-12-13 RX ORDER — INSULIN GLARGINE 100 [IU]/ML
INJECTION, SOLUTION SUBCUTANEOUS
Qty: 30 ML | Refills: 11 | Status: SHIPPED | OUTPATIENT
Start: 2018-12-13 | End: 2019-10-16 | Stop reason: SDUPTHER

## 2018-12-13 NOTE — TELEPHONE ENCOUNTER
Needs an a1c and a mammogram and a flu shot.     Health Maintenance Due   Topic Date Due    Influenza Vaccine  08/01/2018    Mammogram  10/26/2018    Hemoglobin A1c  11/06/2018

## 2018-12-15 RX ORDER — DULAGLUTIDE 0.75 MG/.5ML
INJECTION, SOLUTION SUBCUTANEOUS
Refills: 2 | Status: CANCELLED | OUTPATIENT
Start: 2018-12-15

## 2018-12-17 ENCOUNTER — LAB VISIT (OUTPATIENT)
Dept: LAB | Facility: HOSPITAL | Age: 58
End: 2018-12-17
Attending: FAMILY MEDICINE
Payer: COMMERCIAL

## 2018-12-17 DIAGNOSIS — E11.9 TYPE 2 DIABETES MELLITUS WITHOUT COMPLICATION: ICD-10-CM

## 2018-12-17 LAB
ESTIMATED AVG GLUCOSE: 186 MG/DL
HBA1C MFR BLD HPLC: 8.1 %

## 2018-12-17 PROCEDURE — 83036 HEMOGLOBIN GLYCOSYLATED A1C: CPT

## 2018-12-17 PROCEDURE — 36415 COLL VENOUS BLD VENIPUNCTURE: CPT | Mod: PO

## 2019-01-10 ENCOUNTER — PATIENT MESSAGE (OUTPATIENT)
Dept: FAMILY MEDICINE | Facility: CLINIC | Age: 59
End: 2019-01-10

## 2019-01-28 ENCOUNTER — HOSPITAL ENCOUNTER (OUTPATIENT)
Dept: RADIOLOGY | Facility: HOSPITAL | Age: 59
Discharge: HOME | End: 2019-01-28
Attending: PODIATRIST
Payer: COMMERCIAL

## 2019-01-28 ENCOUNTER — OFFICE VISIT (OUTPATIENT)
Dept: PODIATRY | Facility: CLINIC | Age: 59
End: 2019-01-28
Payer: COMMERCIAL

## 2019-01-28 VITALS — RESPIRATION RATE: 20 BRPM | HEIGHT: 68 IN | WEIGHT: 293 LBS | BODY MASS INDEX: 44.41 KG/M2

## 2019-01-28 DIAGNOSIS — S90.31XA CONTUSION OF RIGHT HEEL, INITIAL ENCOUNTER: Primary | ICD-10-CM

## 2019-01-28 DIAGNOSIS — M79.671 PAIN IN RIGHT FOOT: ICD-10-CM

## 2019-01-28 DIAGNOSIS — E11.49 TYPE II DIABETES MELLITUS WITH NEUROLOGICAL MANIFESTATIONS: ICD-10-CM

## 2019-01-28 PROCEDURE — 99203 OFFICE O/P NEW LOW 30 MIN: CPT | Mod: S$GLB,,, | Performed by: PODIATRIST

## 2019-01-28 PROCEDURE — 99999 PR PBB SHADOW E&M-EST. PATIENT-LVL III: CPT | Mod: PBBFAC,,, | Performed by: PODIATRIST

## 2019-01-28 PROCEDURE — 3008F BODY MASS INDEX DOCD: CPT | Mod: CPTII,S$GLB,, | Performed by: PODIATRIST

## 2019-01-28 PROCEDURE — 73630 X-RAY EXAM OF FOOT: CPT | Mod: 26,RT,, | Performed by: RADIOLOGY

## 2019-01-28 PROCEDURE — 3008F PR BODY MASS INDEX (BMI) DOCUMENTED: ICD-10-PCS | Mod: CPTII,S$GLB,, | Performed by: PODIATRIST

## 2019-01-28 PROCEDURE — 99203 PR OFFICE/OUTPT VISIT, NEW, LEVL III, 30-44 MIN: ICD-10-PCS | Mod: S$GLB,,, | Performed by: PODIATRIST

## 2019-01-28 PROCEDURE — 99999 PR PBB SHADOW E&M-EST. PATIENT-LVL III: ICD-10-PCS | Mod: PBBFAC,,, | Performed by: PODIATRIST

## 2019-01-28 PROCEDURE — 73630 X-RAY EXAM OF FOOT: CPT | Mod: TC,PO,RT

## 2019-01-28 PROCEDURE — 3045F PR MOST RECENT HEMOGLOBIN A1C LEVEL 7.0-9.0%: CPT | Mod: CPTII,S$GLB,, | Performed by: PODIATRIST

## 2019-01-28 PROCEDURE — 73630 XR FOOT COMPLETE 3 VIEW RIGHT: ICD-10-PCS | Mod: 26,RT,, | Performed by: RADIOLOGY

## 2019-01-28 PROCEDURE — 3045F PR MOST RECENT HEMOGLOBIN A1C LEVEL 7.0-9.0%: ICD-10-PCS | Mod: CPTII,S$GLB,, | Performed by: PODIATRIST

## 2019-01-31 ENCOUNTER — CLINICAL SUPPORT (OUTPATIENT)
Dept: SMOKING CESSATION | Facility: CLINIC | Age: 59
End: 2019-01-31
Payer: COMMERCIAL

## 2019-01-31 DIAGNOSIS — F17.200 NICOTINE DEPENDENCE: Primary | ICD-10-CM

## 2019-01-31 PROCEDURE — 99407 BEHAV CHNG SMOKING > 10 MIN: CPT | Mod: S$GLB,,, | Performed by: INTERNAL MEDICINE

## 2019-01-31 PROCEDURE — 99407 PR TOBACCO USE CESSATION INTENSIVE >10 MINUTES: ICD-10-PCS | Mod: S$GLB,,, | Performed by: INTERNAL MEDICINE

## 2019-01-31 NOTE — PROGRESS NOTES
Spoke with patient today in regard to smoking cessation progress for 6 month telephone follow up, she states not tobacco free. Patient states she is trying to quit smoking on her own using Chantix and nicotine patches from a friend. She states not interested in returning to the program at this time. Informed patient of benefit period, future follow up, and contact information if any further help or support is needed. Will complete smart form for 6 month follow up on Quit attempt #3.

## 2019-02-01 ENCOUNTER — TELEPHONE (OUTPATIENT)
Dept: PODIATRY | Facility: CLINIC | Age: 59
End: 2019-02-01

## 2019-02-01 NOTE — TELEPHONE ENCOUNTER
Called and spoke to patient  Appt. Cancelled due to Dr. Dhillon out on medical leave . Per pt. Request she will call back  Reschedule appt. When Dr. Dhillon return. If she schedule before Dr. Dhillon return  Appt. To be scheduled with Dr. Nix or Dr. Doyle  For calluses and the visit  Will efra PROC B $42

## 2019-02-19 PROBLEM — S90.31XA CONTUSION OF RIGHT HEEL: Status: ACTIVE | Noted: 2019-02-19

## 2019-02-19 PROBLEM — E11.49 TYPE II DIABETES MELLITUS WITH NEUROLOGICAL MANIFESTATIONS: Status: ACTIVE | Noted: 2019-02-19

## 2019-02-19 PROBLEM — M79.671 PAIN IN RIGHT FOOT: Status: ACTIVE | Noted: 2019-02-19

## 2019-02-20 NOTE — PATIENT INSTRUCTIONS
- Perform wound care daily after shower/bath and whenever dressing becomes soiled or wet via removing dressing, cleansing with betadine, patting area dry, applying antibiotic cream, and covering with bandaid.    - Check feet daily for wounds and areas of irritation.    - Keep regularly scheduled appointments with primary care, ophthalmology, and podiatry.    - Maintain blood glucose control via taking medications as prescribed, diabetic diet, and exercise.    - Apply lotion to feet and ankles daily but not between toes.    - Keep feet clean and dry, especially between toes.    - Elevate feet as much as possible throughout the day.    - Wear supportive/accommodative shoes at all times when ambulating.    - Supplement with alpha lipoic acid and vitamin B complex to reduce and repair nerve damage.    - Notify clinic immediately of any new or worsening conditions.    - Follow up in 1-2 weeks for foot care.

## 2019-02-20 NOTE — PROGRESS NOTES
Subjective:      Patient ID: Chelsie Garrett is a 58 y.o. female.    Chief Complaint: Foot Problem (x1 month ); Foot Pain (right heel ); and Diabetes Mellitus (PCP: Lien 9/10/18 A1C: 8.1 seen 12/17/18 )      HPI:  Chelsie is a 58 y.o. female who presents to the clinic complaining of heel pain in the right foot. The pain is described as Aching, Dull, Burning, Throbbing and Tingling. The onset of the pain was sudden and has slightly improved over the past several days. Chelsie rates the pain as 3/10. She denies a history of trauma. Prior treatments include rest and not bearing weight on right heel.  Patient denies any other pedal complaints at this time.    PCP: Yasir Emmanuel MD  Date last seen: 9/10/18 Sara Barraza NP    Review of Systems   Constitutional: Negative for appetite change, fever, chills, fatigue and unexpected weight change.   Respiratory: Negative for cough, wheezing, and shortness of breath.   Cardiovascular: Negative for chest pain, claudication, cyanosis, and leg swelling.  Endocrine:  Negative for intolerance to cold, intolerance to heat, polydipsia, polyphagia, and polyuria.    Gastrointestinal: Negative for nausea, vomiting, diarrhea, and constipation.   Musculoskeletal: Negative for back pain.  Positive for arthritis, joint pain, joint swelling, myalgias, and stiffness.   Skin: Negative for nail bed changes,rash, itching, poor wound healing, and unusual hair distribution.  Positive for discoloration and suspicious lesion.  Neurological: Negative for loss of balance, sensory change, paresthesias, and numbness. Positive for pain.  Hematological: Negative for adenopathy, bleeding, and bruising easily.   Psychiatric/Behavioral: The patient is not nervous/anxious.  Negative for altered mental status.    Hemoglobin A1C   Date Value Ref Range Status   12/17/2018 8.1 (H) 4.0 - 5.6 % Final     Comment:     ADA Screening Guidelines:  5.7-6.4%  Consistent with prediabetes  >or=6.5%  Consistent with  diabetes  High levels of fetal hemoglobin interfere with the HbA1C  assay. Heterozygous hemoglobin variants (HbS, HgC, etc)do  not significantly interfere with this assay.   However, presence of multiple variants may affect accuracy.     2018 8.7 (H) 4.0 - 5.6 % Final     Comment:     ADA Screening Guidelines:  5.7-6.4%  Consistent with prediabetes  >or=6.5%  Consistent with diabetes  High levels of fetal hemoglobin interfere with the HbA1C  assay. Heterozygous hemoglobin variants (HbS, HgC, etc)do  not significantly interfere with this assay.   However, presence of multiple variants may affect accuracy.     10/03/2017 8.1 (H) 4.0 - 5.6 % Final     Comment:     According to ADA guidelines, hemoglobin A1c <7.0% represents  optimal control in non-pregnant diabetic patients. Different  metrics may apply to specific patient populations.   Standards of Medical Care in Diabetes-2016.  For the purpose of screening for the presence of diabetes:  <5.7%     Consistent with the absence of diabetes  5.7-6.4%  Consistent with increasing risk for diabetes   (prediabetes)  >or=6.5%  Consistent with diabetes  Currently, no consensus exists for use of hemoglobin A1c  for diagnosis of diabetes for children.  This Hemoglobin A1c assay has significant interference with fetal   hemoglobin   (HbF). The results are invalid for patients with abnormal amounts of   HbF,   including those with known Hereditary Persistence   of Fetal Hemoglobin. Heterozygous hemoglobin variants (HbAS, HbAC,   HbAD, HbAE, HbA2) do not significantly interfere with this assay;   however, presence of multiple variants in a sample may impact the %   interference.         Past Medical History:   Diagnosis Date    Cigarette nicotine dependence without complication 2017    DM (diabetes mellitus) type II uncontrolled with eye manifestation     Nicotine addiction      Past Surgical History:   Procedure Laterality Date     SECTION      COLONOSCOPY  "N/A 2/5/2015    Performed by Yasir Emmanuel MD at [facility]     Family History   Problem Relation Age of Onset    Cancer Mother         pancreatic    Cancer Father         lung    Stroke Father     Coronary artery disease Father     Diabetes Father     Hypertension Father     Bladder Cancer Paternal Uncle     Lymphoma Paternal Uncle      Social History     Socioeconomic History    Marital status:      Spouse name: None    Number of children: None    Years of education: None    Highest education level: None   Social Needs    Financial resource strain: None    Food insecurity - worry: None    Food insecurity - inability: None    Transportation needs - medical: None    Transportation needs - non-medical: None   Occupational History     Employer: [employer]   Tobacco Use    Smoking status: Current Every Day Smoker     Packs/day: 1.50     Years: 37.00     Pack years: 55.50     Types: Cigarettes    Smokeless tobacco: Never Used    Tobacco comment: Quit smoking 10/5/17. 7/30/18 Lapsed. 8/27/18 Quit smoking.   Substance and Sexual Activity    Alcohol use: Yes    Drug use: No    Sexual activity: None   Other Topics Concern    None   Social History Narrative    None           Objective:        Resp 20   Ht 5' 8" (1.727 m)   Wt 133.8 kg (295 lb)   BMI 44.85 kg/m²     Physical Exam   Constitutional: Patient is oriented to person, place, and time. Patient appears well-developed and well-nourished. No acute distress.     Psychiatric: Patient has a normal mood and affect. Patient's speech is normal and behavior is normal. Judgment is normal. Cognition and memory are normal.     Right pedal exam was performed today.  Vascular: Pedal pulses palpable 2/4 DP & PT.  CFT is = 3 seconds to the hallux.  Skin temperature is warm to cool proximal tibia to distal toes without localized increase in calor noted.  Localized erythema and edema without ecchymosis noted to the plantar heel.  Hair " growth decreased distally to the LE.  Telangectasias and reticular veins present B/L LE.     Musculoskeletal: Ankle joint ROM is decreased. Subtalar joint ROM is decreased.  Midtarsal joint ROM is decreased.  1st ray ROM is within normal limits.  1st  MTPJ ROM is decreased.  Ankle joint dorsiflexion is restricted with the knee extended and flexed per Silf[place] exam.    Muscle strength is 5/5 for all LE muscle groups tested.    Neurological: Protective sensation is intact to 10/10 sites on the right foot via [place] monofilament.    Proprioception is Intact to the foot.  Vibratory sensation is Intact to the foot.   Light touch sensation is Intact to the foot.   Achilles DTR and Chaddock STR are Intact to right lower extremity.   Negative Babinski sign right lower extremity.  Negative clonus sign right lower extremity.  Mild tenderness to palpation of right plantar heel lesion.    Dermatological: Toenails 1-5 right are WNL in length and thickness.  Webspaces 1-4 right are clean, dry, and intact.  Skin turgor is increased.  No dry, flaky skin noted to the LE.  No open wounds appreciable to the foot or ankle.  Resolving contusion present to right plantar heel without open wound or amanda signs of infection noted.    Nursing note and vitals reviewed.        Assessment:       Encounter Diagnoses   Name Primary?    Contusion of right heel, initial encounter - Right Foot Yes    Pain in right foot - Right Foot     Type II diabetes mellitus with neurological manifestations          Plan:       Chelsie was seen today for foot problem, foot pain and diabetes mellitus.    Diagnoses and all orders for this visit:    Contusion of right heel, initial encounter - Right Foot    Pain in right foot - Right Foot  -     X-Ray Foot Complete Right; Future    Type II diabetes mellitus with neurological manifestations      I counseled the patient on her conditions, their implications and medical management.    - Ordered and  reviewed xrays with patient.    - Shoe inspection. Diabetic Foot Education. Patient reminded of the importance of good nutrition and blood sugar control to help prevent podiatric complications of diabetes. Patient instructed on proper foot hygeine. We discussed wearing proper shoe gear, daily foot inspections, never walking without protective shoe gear, never putting sharp instruments to feet, routine podiatric nail visits every 2-3 months.     - Applied offloading aperture pad, antibiotic cream, and bandaid to lesion and instructed patient to repeat daily until area healed.    Patient was given the following recommendations and instructions:  Patient Instructions   - Perform wound care daily after shower/bath and whenever dressing becomes soiled or wet via removing dressing, cleansing with betadine, patting area dry, applying antibiotic cream, and covering with bandaid.    - Check feet daily for wounds and areas of irritation.    - Keep regularly scheduled appointments with primary care, ophthalmology, and podiatry.    - Maintain blood glucose control via taking medications as prescribed, diabetic diet, and exercise.    - Apply lotion to feet and ankles daily but not between toes.    - Keep feet clean and dry, especially between toes.    - Elevate feet as much as possible throughout the day.    - Wear supportive/accommodative shoes at all times when ambulating.    - Supplement with alpha lipoic acid and vitamin B complex to reduce and repair nerve damage.    - Notify clinic immediately of any new or worsening conditions.    - Follow up in 1-2 weeks for foot care.          Belen Dhillon DPM        Dictation was performed using M*Modal Fluency.  Transcription errors may be present.

## 2019-03-29 DIAGNOSIS — E11.9 TYPE 2 DIABETES MELLITUS WITHOUT COMPLICATION: ICD-10-CM

## 2019-05-09 ENCOUNTER — PATIENT MESSAGE (OUTPATIENT)
Dept: ADMINISTRATIVE | Facility: HOSPITAL | Age: 59
End: 2019-05-09

## 2019-05-09 ENCOUNTER — PATIENT OUTREACH (OUTPATIENT)
Dept: ADMINISTRATIVE | Facility: HOSPITAL | Age: 59
End: 2019-05-09

## 2019-05-09 NOTE — LETTER
May 9, 2019        We are seeing Chelsie Garrett, 1960, at [facility]. Yasir Emmanuel MD is their primary care physician. To help with our [place] maintenance records could you please send the following:     Patient Last Eye Exam Report    Please fax to [facility] at [phone], attention Parvin Garvin LPN.    Thank-you in advance for your assistance. If you have any questions or concerns please contact me at [phone].     Parvin Garvin LPN  Care Coordination Department  [facility]

## 2019-05-09 NOTE — PROGRESS NOTES
Pt requested an a1c lab for tomorrow per pt portal. A1c ordered and lab scheduled.  Pt stated she had eye exam done by Dr. Bustillo.  Request sent to Dr. Bustillo for pt last eye exam.

## 2019-05-13 ENCOUNTER — LAB VISIT (OUTPATIENT)
Dept: LAB | Facility: HOSPITAL | Age: 59
End: 2019-05-13
Attending: FAMILY MEDICINE
Payer: COMMERCIAL

## 2019-05-13 DIAGNOSIS — E11.9 TYPE 2 DIABETES MELLITUS WITHOUT COMPLICATION: ICD-10-CM

## 2019-05-13 LAB
ESTIMATED AVG GLUCOSE: 197 MG/DL (ref 68–131)
HBA1C MFR BLD HPLC: 8.5 % (ref 4–5.6)

## 2019-05-13 PROCEDURE — 36415 COLL VENOUS BLD VENIPUNCTURE: CPT | Mod: PO

## 2019-05-13 PROCEDURE — 83036 HEMOGLOBIN GLYCOSYLATED A1C: CPT

## 2019-05-14 NOTE — TELEPHONE ENCOUNTER
----- Message from Yasir Emmanuel MD sent at 5/13/2019  9:23 PM CDT -----  CHANGE TRULICITY TO 1.5 MG WEEKLY AND INCREASE THE LANTUS FROM 70U DAILY TO 78 U DAILY AND RECHECK THE A1C IN 3 MONTH.  ASK FOR A LOG OF FASTING GLUCOSE IN 10 DAYS.

## 2019-05-14 NOTE — PROGRESS NOTES
CHANGE TRULICITY TO 1.5 MG WEEKLY AND INCREASE THE LANTUS FROM 70U DAILY TO 78 U DAILY AND RECHECK THE A1C IN 3 MONTH.  ASK FOR A LOG OF FASTING GLUCOSE IN 10 DAYS.

## 2019-05-14 NOTE — TELEPHONE ENCOUNTER
I have signed for the following orders AND/OR meds.  Please call the patient and ask the patient to schedule the testing AND/OR inform about any medications that were sent.      Orders Placed This Encounter   Procedures    Hemoglobin A1c     Standing Status:   Standing     Number of Occurrences:   99     Standing Expiration Date:   5/14/2039       Medications Ordered This Encounter   Medications    dulaglutide (TRULICITY) 1.5 mg/0.5 mL PnIj     Sig: Inject 1.5 mg into the skin every 7 days.     Dispense:  12 Syringe     Refill:  0

## 2019-05-28 ENCOUNTER — PATIENT MESSAGE (OUTPATIENT)
Dept: FAMILY MEDICINE | Facility: CLINIC | Age: 59
End: 2019-05-28

## 2019-06-05 ENCOUNTER — PATIENT MESSAGE (OUTPATIENT)
Dept: FAMILY MEDICINE | Facility: CLINIC | Age: 59
End: 2019-06-05

## 2019-06-07 ENCOUNTER — PATIENT OUTREACH (OUTPATIENT)
Dept: ADMINISTRATIVE | Facility: HOSPITAL | Age: 59
End: 2019-06-07

## 2019-06-07 DIAGNOSIS — E78.5 HYPERLIPIDEMIA ASSOCIATED WITH TYPE 2 DIABETES MELLITUS: Primary | ICD-10-CM

## 2019-06-07 DIAGNOSIS — E11.69 HYPERLIPIDEMIA ASSOCIATED WITH TYPE 2 DIABETES MELLITUS: Primary | ICD-10-CM

## 2019-06-10 ENCOUNTER — PATIENT MESSAGE (OUTPATIENT)
Dept: FAMILY MEDICINE | Facility: CLINIC | Age: 59
End: 2019-06-10

## 2019-06-10 NOTE — TELEPHONE ENCOUNTER
Patient would like to try metformin and would like it sent to [facility] pharmacy in [place].  Please advise.

## 2019-06-11 RX ORDER — METFORMIN HYDROCHLORIDE 1000 MG/1
1000 TABLET ORAL 2 TIMES DAILY WITH MEALS
Qty: 180 TABLET | Refills: 3 | Status: SHIPPED | OUTPATIENT
Start: 2019-06-11 | End: 2019-08-13

## 2019-06-11 NOTE — TELEPHONE ENCOUNTER
Ask her to start it at just one a day for a week.     I have signed for the following orders AND/OR meds.  Please call the patient and ask the patient to schedule the testing AND/OR inform about any medications that were sent.      No orders of the defined types were placed in this encounter.      Medications Ordered This Encounter   Medications    metFORMIN (GLUCOPHAGE) 1000 MG tablet     Sig: Take 1 tablet (1,000 mg total) by mouth 2 (two) times daily with meals.     Dispense:  180 tablet     Refill:  3

## 2019-06-17 ENCOUNTER — PATIENT MESSAGE (OUTPATIENT)
Dept: FAMILY MEDICINE | Facility: CLINIC | Age: 59
End: 2019-06-17

## 2019-06-28 ENCOUNTER — PATIENT MESSAGE (OUTPATIENT)
Dept: FAMILY MEDICINE | Facility: CLINIC | Age: 59
End: 2019-06-28

## 2019-06-28 DIAGNOSIS — E78.5 HYPERLIPIDEMIA ASSOCIATED WITH TYPE 2 DIABETES MELLITUS: ICD-10-CM

## 2019-06-28 DIAGNOSIS — E11.69 HYPERLIPIDEMIA ASSOCIATED WITH TYPE 2 DIABETES MELLITUS: ICD-10-CM

## 2019-07-30 ENCOUNTER — TELEPHONE (OUTPATIENT)
Dept: SMOKING CESSATION | Facility: CLINIC | Age: 59
End: 2019-07-30

## 2019-08-05 ENCOUNTER — TELEPHONE (OUTPATIENT)
Dept: INTERNAL MEDICINE | Facility: CLINIC | Age: 59
End: 2019-08-05

## 2019-08-07 ENCOUNTER — PATIENT OUTREACH (OUTPATIENT)
Dept: ADMINISTRATIVE | Facility: HOSPITAL | Age: 59
End: 2019-08-07

## 2019-08-07 DIAGNOSIS — Z12.31 ENCOUNTER FOR SCREENING MAMMOGRAM FOR BREAST CANCER: Primary | ICD-10-CM

## 2019-08-07 NOTE — PROGRESS NOTES
Spoke with pt and scheduled annual check up with Dr. Emmanuel for 8/30/19 at 8:40 am.  Mammogram scheduled for 8/30/19 also.

## 2019-08-12 ENCOUNTER — LAB VISIT (OUTPATIENT)
Dept: LAB | Facility: HOSPITAL | Age: 59
End: 2019-08-12
Attending: FAMILY MEDICINE
Payer: COMMERCIAL

## 2019-08-12 DIAGNOSIS — E78.5 HYPERLIPIDEMIA ASSOCIATED WITH TYPE 2 DIABETES MELLITUS: ICD-10-CM

## 2019-08-12 DIAGNOSIS — E11.69 HYPERLIPIDEMIA ASSOCIATED WITH TYPE 2 DIABETES MELLITUS: ICD-10-CM

## 2019-08-12 LAB
ESTIMATED AVG GLUCOSE: 171 MG/DL (ref 68–131)
HBA1C MFR BLD HPLC: 7.6 % (ref 4–5.6)

## 2019-08-12 PROCEDURE — 80061 LIPID PANEL: CPT

## 2019-08-12 PROCEDURE — 83036 HEMOGLOBIN GLYCOSYLATED A1C: CPT

## 2019-08-12 PROCEDURE — 36415 COLL VENOUS BLD VENIPUNCTURE: CPT | Mod: PO

## 2019-08-12 NOTE — PROGRESS NOTES
Subjective:       Patient ID: Chelsie Grarett is a 59 y.o. female.    Chief Complaint: Pre-op Exam    HPI     She comes into the office for preop clearance.  She is scheduled to have right eye surgery on August 20, 2019.  This will be performed by Dr[facility] [place].  Medical history includes type 2 diabetes, hyperlipidemia, retinopathy, and glaucoma.  Last labs show an improvement in her hemoglobin A1c.  She reports that she is currently taking Lantus 80 units daily.  She denies shortness of breath or chest pain, no dizziness or syncope, no recent infection or fever.    Review of Systems   Constitutional: Negative for fatigue, fever and unexpected weight change.   HENT: Negative for ear pain and sore throat.    Eyes: Negative for pain and visual disturbance.   Respiratory: Negative for cough and shortness of breath.    Cardiovascular: Negative for chest pain and palpitations.   Gastrointestinal: Negative for abdominal pain, diarrhea and vomiting.   Musculoskeletal: Negative for arthralgias and myalgias.   Skin: Negative for color change and rash.   Neurological: Negative for dizziness and headaches.   Psychiatric/Behavioral: Negative for dysphoric mood and sleep disturbance. The patient is not nervous/anxious.        Vitals:    08/13/19 0756   BP: (!) 158/75   Pulse: 71   Temp: 98.3 °F (36.8 °C)       Objective:     Current Outpatient Medications   Medication Sig Dispense Refill    brimonidine-timolol (COMBIGAN) 0.2-0.5 % Drop Place 1 drop into the right eye 2 (two) times daily. 10 mL 0    insulin aspart U-100 (NOVOLOG) 100 unit/mL InPn pen Give the following sliding scale three times a day before meals based on pre-meal glucose check: If less than 150, give 3 unit SQ.   If 151-200, give 4 units SQ If 201-250, give 5 units SQ If 251-300, give 6 units SQ If 301-350, give 7 units SQ If 351-400, give 8 units SQ If greater than 400, call MD., 15 mL 11    LANTUS SOLOSTAR U-100 INSULIN  glargine 100 units/mL (3mL) SubQ pen INJECT 70 UNITS SUBCUTANEOUSLY ONCE DAILY IN THE MORNING (Patient taking differently: INJECT 78 UNITS SUBCUTANEOUSLY ONCE DAILY IN THE MORNING) 30 mL 11    lisinopril (PRINIVIL,ZESTRIL) 2.5 MG tablet TAKE ONE TABLET BY MOUTH ONCE DAILY 30 tablet 11    atorvastatin (LIPITOR) 80 MG tablet TAKE 1 TABLET BY MOUTH ONCE DAILY 90 tablet 0    dulaglutide (TRULICITY) 1.5 mg/0.5 mL PnIj Inject 1.5 mg into the skin every 7 days. 12 Syringe 0     No current facility-administered medications for this visit.        Physical Exam   Constitutional: She is oriented to person, place, and time. She appears well-developed and well-nourished. No distress.   HENT:   Head: Normocephalic and atraumatic.   Eyes: Pupils are equal, round, and reactive to light. EOM are normal.   Neck: Normal range of motion. Neck supple.   Cardiovascular: Normal rate and regular rhythm.   Pulmonary/Chest: Effort normal and breath sounds normal.   Musculoskeletal: Normal range of motion.   Neurological: She is alert and oriented to person, place, and time.   Skin: Skin is warm and dry. No rash noted.   Psychiatric: She has a normal mood and affect. Judgment normal.   Nursing note and vitals reviewed.        EKG shows NSR, HR 66. No change in EKG from previous 2/4/17    Assessment:       1. Preoperative clearance        Plan:   Preoperative clearance  -     CBC auto differential; Future; Expected date: 08/13/2019  -     EKG 12-lead    I have reviewed EKG and CBC, she is cleared for surgery. [facility]    Follow up if symptoms worsen or fail to improve.    There are no Patient Instructions on file for this visit.

## 2019-08-13 ENCOUNTER — LAB VISIT (OUTPATIENT)
Dept: LAB | Facility: HOSPITAL | Age: 59
End: 2019-08-13
Attending: NURSE PRACTITIONER
Payer: COMMERCIAL

## 2019-08-13 ENCOUNTER — OFFICE VISIT (OUTPATIENT)
Dept: FAMILY MEDICINE | Facility: CLINIC | Age: 59
End: 2019-08-13
Payer: COMMERCIAL

## 2019-08-13 VITALS
BODY MASS INDEX: 44.41 KG/M2 | WEIGHT: 293 LBS | SYSTOLIC BLOOD PRESSURE: 158 MMHG | DIASTOLIC BLOOD PRESSURE: 75 MMHG | HEIGHT: 68 IN | HEART RATE: 71 BPM | TEMPERATURE: 98 F

## 2019-08-13 DIAGNOSIS — Z01.818 PREOPERATIVE CLEARANCE: ICD-10-CM

## 2019-08-13 DIAGNOSIS — Z01.818 PREOPERATIVE CLEARANCE: Primary | ICD-10-CM

## 2019-08-13 LAB
BASOPHILS # BLD AUTO: 0.07 K/UL (ref 0–0.2)
BASOPHILS NFR BLD: 1 % (ref 0–1.9)
CHOLEST SERPL-MCNC: 232 MG/DL (ref 120–199)
CHOLEST/HDLC SERPL: 3.9 {RATIO} (ref 2–5)
DIFFERENTIAL METHOD: ABNORMAL
EOSINOPHIL # BLD AUTO: 0.2 K/UL (ref 0–0.5)
EOSINOPHIL NFR BLD: 2.6 % (ref 0–8)
ERYTHROCYTE [DISTWIDTH] IN BLOOD BY AUTOMATED COUNT: 13.9 % (ref 11.5–14.5)
HCT VFR BLD AUTO: 49 % (ref 37–48.5)
HDLC SERPL-MCNC: 60 MG/DL (ref 40–75)
HDLC SERPL: 25.9 % (ref 20–50)
HGB BLD-MCNC: 15.1 G/DL (ref 12–16)
IMM GRANULOCYTES # BLD AUTO: 0.02 K/UL (ref 0–0.04)
IMM GRANULOCYTES NFR BLD AUTO: 0.3 % (ref 0–0.5)
LDLC SERPL CALC-MCNC: 149 MG/DL (ref 63–159)
LYMPHOCYTES # BLD AUTO: 2.1 K/UL (ref 1–4.8)
LYMPHOCYTES NFR BLD: 30.4 % (ref 18–48)
MCH RBC QN AUTO: 29.4 PG (ref 27–31)
MCHC RBC AUTO-ENTMCNC: 30.8 G/DL (ref 32–36)
MCV RBC AUTO: 96 FL (ref 82–98)
MONOCYTES # BLD AUTO: 0.7 K/UL (ref 0.3–1)
MONOCYTES NFR BLD: 10.4 % (ref 4–15)
NEUTROPHILS # BLD AUTO: 3.9 K/UL (ref 1.8–7.7)
NEUTROPHILS NFR BLD: 55.3 % (ref 38–73)
NONHDLC SERPL-MCNC: 172 MG/DL
NRBC BLD-RTO: 0 /100 WBC
PLATELET # BLD AUTO: 250 K/UL (ref 150–350)
PMV BLD AUTO: 10.2 FL (ref 9.2–12.9)
RBC # BLD AUTO: 5.13 M/UL (ref 4–5.4)
TRIGL SERPL-MCNC: 115 MG/DL (ref 30–150)
WBC # BLD AUTO: 7.04 K/UL (ref 3.9–12.7)

## 2019-08-13 PROCEDURE — 99214 PR OFFICE/OUTPT VISIT, EST, LEVL IV, 30-39 MIN: ICD-10-PCS | Mod: S$GLB,,, | Performed by: NURSE PRACTITIONER

## 2019-08-13 PROCEDURE — 93010 EKG 12-LEAD: ICD-10-PCS | Mod: S$GLB,,, | Performed by: INTERNAL MEDICINE

## 2019-08-13 PROCEDURE — 36415 COLL VENOUS BLD VENIPUNCTURE: CPT | Mod: PO

## 2019-08-13 PROCEDURE — 99214 OFFICE O/P EST MOD 30 MIN: CPT | Mod: S$GLB,,, | Performed by: NURSE PRACTITIONER

## 2019-08-13 PROCEDURE — 3008F PR BODY MASS INDEX (BMI) DOCUMENTED: ICD-10-PCS | Mod: CPTII,S$GLB,, | Performed by: NURSE PRACTITIONER

## 2019-08-13 PROCEDURE — 99999 PR PBB SHADOW E&M-EST. PATIENT-LVL III: ICD-10-PCS | Mod: PBBFAC,,, | Performed by: NURSE PRACTITIONER

## 2019-08-13 PROCEDURE — 3008F BODY MASS INDEX DOCD: CPT | Mod: CPTII,S$GLB,, | Performed by: NURSE PRACTITIONER

## 2019-08-13 PROCEDURE — 85025 COMPLETE CBC W/AUTO DIFF WBC: CPT

## 2019-08-13 PROCEDURE — 3077F PR MOST RECENT SYSTOLIC BLOOD PRESSURE >= 140 MM HG: ICD-10-PCS | Mod: CPTII,S$GLB,, | Performed by: NURSE PRACTITIONER

## 2019-08-13 PROCEDURE — 93005 ELECTROCARDIOGRAM TRACING: CPT | Mod: S$GLB,,, | Performed by: NURSE PRACTITIONER

## 2019-08-13 PROCEDURE — 3077F SYST BP >= 140 MM HG: CPT | Mod: CPTII,S$GLB,, | Performed by: NURSE PRACTITIONER

## 2019-08-13 PROCEDURE — 3078F DIAST BP <80 MM HG: CPT | Mod: CPTII,S$GLB,, | Performed by: NURSE PRACTITIONER

## 2019-08-13 PROCEDURE — 93010 ELECTROCARDIOGRAM REPORT: CPT | Mod: S$GLB,,, | Performed by: INTERNAL MEDICINE

## 2019-08-13 PROCEDURE — 99999 PR PBB SHADOW E&M-EST. PATIENT-LVL III: CPT | Mod: PBBFAC,,, | Performed by: NURSE PRACTITIONER

## 2019-08-13 PROCEDURE — 3078F PR MOST RECENT DIASTOLIC BLOOD PRESSURE < 80 MM HG: ICD-10-PCS | Mod: CPTII,S$GLB,, | Performed by: NURSE PRACTITIONER

## 2019-08-13 PROCEDURE — 93005 EKG 12-LEAD: ICD-10-PCS | Mod: S$GLB,,, | Performed by: NURSE PRACTITIONER

## 2019-08-13 NOTE — PROGRESS NOTES
This is much better than it was in the past!!!!  Keep up the great work.  Please increase the lantus from wherever it is not by 5 units.  The RX says 70 or 78 units so I don;t know what she is on right now.  Please confirm what the dose is and increase it by 5 u nits a day.  Recheck the a1c in 6 months.

## 2019-08-14 ENCOUNTER — TELEPHONE (OUTPATIENT)
Dept: FAMILY MEDICINE | Facility: CLINIC | Age: 59
End: 2019-08-14

## 2019-08-14 NOTE — TELEPHONE ENCOUNTER
----- Message from Yasir Emmanuel MD sent at 8/12/2019 11:37 PM CDT -----  This is much better than it was in the past!!!!  Keep up the great work.  Please increase the lantus from wherever it is not by 5 units.  The RX says 70 or 78 units so I don;t know what she is on right now.  Please confirm what the dose is and increase it by 5 u nits a day.  Recheck the a1c in 6 months.

## 2019-08-14 NOTE — TELEPHONE ENCOUNTER
----- Message from Madelaine Jacobo sent at 8/14/2019  3:23 PM CDT -----  ..Type:  Patient Returning Call    Who Called:self  Who Left Message for Patient:  Does the patient know what this is regarding?:results  Would the patient rather a call back or a response via [facility]? call  Best Call Back Number:.[phone] (home)   Additional Information:

## 2019-08-15 ENCOUNTER — TELEPHONE (OUTPATIENT)
Dept: FAMILY MEDICINE | Facility: CLINIC | Age: 59
End: 2019-08-15

## 2019-08-15 NOTE — TELEPHONE ENCOUNTER
----- Message from Sara Barraza NP sent at 8/15/2019  9:21 AM CDT -----  Please fax clearance paperwork along with EKG, CBC, and med list

## 2019-08-15 NOTE — PROGRESS NOTES
The LDL has gone up with the keto diet that is being used.  We need to continue the lipitor 80 mg a day and add zetia 10 mg a day to work synergistically to get the LDL to goal.  Give #30 and rf x 2 and recheck a lipid in 1 month to try to get it to goal.

## 2019-08-20 RX ORDER — EZETIMIBE 10 MG/1
10 TABLET ORAL DAILY
COMMUNITY
End: 2019-08-20 | Stop reason: SDUPTHER

## 2019-08-20 RX ORDER — EZETIMIBE 10 MG/1
10 TABLET ORAL DAILY
Qty: 90 TABLET | Refills: 0 | Status: SHIPPED | OUTPATIENT
Start: 2019-08-20 | End: 2019-12-05 | Stop reason: SDUPTHER

## 2019-08-20 RX ORDER — ATORVASTATIN CALCIUM 80 MG/1
80 TABLET, FILM COATED ORAL DAILY
Qty: 90 TABLET | Refills: 0 | Status: SHIPPED | OUTPATIENT
Start: 2019-08-20 | End: 2019-12-05 | Stop reason: SDUPTHER

## 2019-08-20 NOTE — TELEPHONE ENCOUNTER
----- Message from Yasir Emmanuel MD sent at 8/14/2019  8:49 PM CDT -----  The LDL has gone up with the keto diet that is being used.  We need to continue the lipitor 80 mg a day and add zetia 10 mg a day to work synergistically to get the LDL to goal.  Give #30 and rf x 2 and recheck a lipid in 1 month to try to get it to goal.

## 2019-09-27 ENCOUNTER — PATIENT MESSAGE (OUTPATIENT)
Dept: FAMILY MEDICINE | Facility: CLINIC | Age: 59
End: 2019-09-27

## 2019-10-16 RX ORDER — INSULIN GLARGINE 100 [IU]/ML
INJECTION, SOLUTION SUBCUTANEOUS
Qty: 24 ML | Refills: 11 | Status: SHIPPED | OUTPATIENT
Start: 2019-10-16 | End: 2019-12-05 | Stop reason: SDUPTHER

## 2019-10-29 RX ORDER — INSULIN ASPART 100 [IU]/ML
INJECTION, SOLUTION INTRAVENOUS; SUBCUTANEOUS
Qty: 15 ML | Refills: 0 | Status: SHIPPED | OUTPATIENT
Start: 2019-10-29 | End: 2019-12-05 | Stop reason: SDUPTHER

## 2019-12-04 ENCOUNTER — PATIENT MESSAGE (OUTPATIENT)
Dept: FAMILY MEDICINE | Facility: CLINIC | Age: 59
End: 2019-12-04

## 2019-12-05 ENCOUNTER — TELEPHONE (OUTPATIENT)
Dept: FAMILY MEDICINE | Facility: CLINIC | Age: 59
End: 2019-12-05

## 2019-12-05 ENCOUNTER — PATIENT OUTREACH (OUTPATIENT)
Dept: ADMINISTRATIVE | Facility: HOSPITAL | Age: 59
End: 2019-12-05

## 2019-12-05 RX ORDER — LISINOPRIL 2.5 MG/1
2.5 TABLET ORAL DAILY
Qty: 90 TABLET | Refills: 3 | Status: SHIPPED | OUTPATIENT
Start: 2019-12-05 | End: 2020-08-17 | Stop reason: SDUPTHER

## 2019-12-05 RX ORDER — EZETIMIBE 10 MG/1
10 TABLET ORAL DAILY
Qty: 90 TABLET | Refills: 3 | Status: SHIPPED | OUTPATIENT
Start: 2019-12-05 | End: 2020-08-17 | Stop reason: SDUPTHER

## 2019-12-05 RX ORDER — INSULIN GLARGINE 100 [IU]/ML
INJECTION, SOLUTION SUBCUTANEOUS
Qty: 75 ML | Refills: 1 | Status: SHIPPED | OUTPATIENT
Start: 2019-12-05 | End: 2020-08-17 | Stop reason: SDUPTHER

## 2019-12-05 RX ORDER — ATORVASTATIN CALCIUM 80 MG/1
80 TABLET, FILM COATED ORAL DAILY
Qty: 90 TABLET | Refills: 3 | Status: SHIPPED | OUTPATIENT
Start: 2019-12-05 | End: 2020-08-17 | Stop reason: SDUPTHER

## 2019-12-05 RX ORDER — INSULIN ASPART 100 [IU]/ML
INJECTION, SOLUTION INTRAVENOUS; SUBCUTANEOUS
Qty: 45 ML | Refills: 1 | Status: SHIPPED | OUTPATIENT
Start: 2019-12-05 | End: 2020-07-31 | Stop reason: SDUPTHER

## 2019-12-10 ENCOUNTER — TELEPHONE (OUTPATIENT)
Dept: FAMILY MEDICINE | Facility: CLINIC | Age: 59
End: 2019-12-10

## 2019-12-10 NOTE — PROGRESS NOTES
Subjective:       Patient ID: Chelsie Garrett is a 59 y.o. female.    Chief Complaint: Annual Exam    She comes in for routine annual wellness exam. She is due for fasting labs. Pap smear is scheduled for tomorrow.     Diabetes   She presents for her follow-up diabetic visit. She has type 2 diabetes mellitus. Her disease course has been stable. There are no hypoglycemic associated symptoms. Pertinent negatives for hypoglycemia include no dizziness, headaches or nervousness/anxiousness. There are no diabetic associated symptoms. Pertinent negatives for diabetes include no chest pain and no fatigue. There are no hypoglycemic complications. Symptoms are stable. Diabetic complications include peripheral neuropathy. Risk factors for coronary artery disease include diabetes mellitus, dyslipidemia and obesity. Current diabetic treatment includes insulin injections. She is compliant with treatment some of the time. Her weight is stable. She is following a generally unhealthy diet. An ACE inhibitor/angiotensin II receptor blocker is being taken.   Hyperlipidemia   This is a chronic problem. The current episode started more than 1 year ago. The problem is uncontrolled. Exacerbating diseases include diabetes and obesity. Pertinent negatives include no chest pain, myalgias or shortness of breath. Current antihyperlipidemic treatment includes statins and ezetimibe. The current treatment provides significant improvement of lipids.       Review of Systems   Constitutional: Negative for fatigue, fever and unexpected weight change.   HENT: Negative for ear pain and sore throat.    Eyes: Negative for pain and visual disturbance.   Respiratory: Negative for cough and shortness of breath.    Cardiovascular: Negative for chest pain and palpitations.   Gastrointestinal: Negative for abdominal pain, diarrhea and vomiting.   Musculoskeletal: Negative for arthralgias and myalgias.   Skin: Negative for color change and rash.   Neurological:  Negative for dizziness and headaches.   Psychiatric/Behavioral: Negative for dysphoric mood and sleep disturbance. The patient is not nervous/anxious.        Vitals:    12/12/19 0955   BP: 122/76   Pulse: 78   Temp: 98.3 °F (36.8 °C)       Objective:     Current Outpatient Medications   Medication Sig Dispense Refill    atorvastatin (LIPITOR) 80 MG tablet Take 1 tablet (80 mg total) by mouth once daily. 90 tablet 3    brimonidine-timolol (COMBIGAN) 0.2-0.5 % Drop Place 1 drop into the right eye 2 (two) times daily. 10 mL 0    dulaglutide (TRULICITY) 1.5 mg/0.5 mL PnIj Inject 1.5 mg into the skin every 7 days. 12 Syringe 1    ezetimibe (ZETIA) 10 mg tablet Take 1 tablet (10 mg total) by mouth once daily. 90 tablet 3    insulin (LANTUS SOLOSTAR U-100 INSULIN) glargine 100 units/mL (3mL) SubQ pen INJECT 80 UNITS SQ DAILY EVERY MORNING 75 mL 1    insulin aspart U-100 (NOVOLOG) 100 unit/mL (3 mL) InPn pen Give the following sliding scale three times a day before meals based on pre-meal glucose check: If less than 150, give 3 unit SQ.   If 151-200, give 4 units SQ If 201-250, give 5 units SQ If 251-300, give 6 units SQ If 301-350, give 7 units SQ If 351-400, give 8 units SQ If greater than 400, call MD., 45 mL 1    lisinopril (PRINIVIL,ZESTRIL) 2.5 MG tablet Take 1 tablet (2.5 mg total) by mouth once daily. 90 tablet 3     No current facility-administered medications for this visit.        Physical Exam   Constitutional: She is oriented to person, place, and time. She appears well-developed and well-nourished. No distress.   obese   HENT:   Head: Normocephalic and atraumatic.   Eyes: Pupils are equal, round, and reactive to light. EOM are normal.   Neck: Normal range of motion. Neck supple.   Cardiovascular: Normal rate and regular rhythm.   Pulses:       Dorsalis pedis pulses are 2+ on the right side, and 2+ on the left side.   Pulmonary/Chest: Effort normal and breath sounds normal.   Musculoskeletal: Normal  range of motion.        Right foot: There is no deformity.        Left foot: There is no deformity.   Feet:   Right Foot:   Protective Sensation: 4 sites tested. 4 sites sensed.   Skin Integrity: Negative for ulcer, blister, skin breakdown or erythema.   Left Foot:   Protective Sensation: 4 sites tested. 4 sites sensed.   Skin Integrity: Negative for ulcer, blister, skin breakdown or erythema.   Neurological: She is alert and oriented to person, place, and time.   Skin: Skin is warm and dry. No rash noted.   Psychiatric: She has a normal mood and affect. Judgment normal.   Nursing note and vitals reviewed.      Assessment:       1. Annual physical exam    2. Type II diabetes mellitus with neurological manifestations    3. Hyperlipidemia associated with type 2 diabetes mellitus    4. Tobacco abuse disorder    5. Immunization due        Plan:   Annual physical exam    Type II diabetes mellitus with neurological manifestations    Hyperlipidemia associated with type 2 diabetes mellitus    Tobacco abuse disorder    Immunization due  -     Influenza - Quadrivalent (PF)    she is going to return for fasting labs  All refills were sent last week, continue current medications    No follow-ups on file.    There are no Patient Instructions on file for this visit.

## 2019-12-12 ENCOUNTER — OFFICE VISIT (OUTPATIENT)
Dept: FAMILY MEDICINE | Facility: CLINIC | Age: 59
End: 2019-12-12
Payer: COMMERCIAL

## 2019-12-12 VITALS
HEART RATE: 78 BPM | HEIGHT: 68 IN | DIASTOLIC BLOOD PRESSURE: 76 MMHG | SYSTOLIC BLOOD PRESSURE: 122 MMHG | TEMPERATURE: 98 F | BODY MASS INDEX: 44.41 KG/M2 | WEIGHT: 293 LBS

## 2019-12-12 DIAGNOSIS — E11.69 HYPERLIPIDEMIA ASSOCIATED WITH TYPE 2 DIABETES MELLITUS: ICD-10-CM

## 2019-12-12 DIAGNOSIS — Z72.0 TOBACCO ABUSE DISORDER: Chronic | ICD-10-CM

## 2019-12-12 DIAGNOSIS — Z00.00 ANNUAL PHYSICAL EXAM: Primary | ICD-10-CM

## 2019-12-12 DIAGNOSIS — Z23 IMMUNIZATION DUE: ICD-10-CM

## 2019-12-12 DIAGNOSIS — E11.49 TYPE II DIABETES MELLITUS WITH NEUROLOGICAL MANIFESTATIONS: ICD-10-CM

## 2019-12-12 DIAGNOSIS — E78.5 HYPERLIPIDEMIA ASSOCIATED WITH TYPE 2 DIABETES MELLITUS: ICD-10-CM

## 2019-12-12 PROCEDURE — 99396 PR PREVENTIVE VISIT,EST,40-64: ICD-10-PCS | Mod: 25,S$GLB,, | Performed by: NURSE PRACTITIONER

## 2019-12-12 PROCEDURE — 90471 FLU VACCINE (QUAD) GREATER THAN OR EQUAL TO 3YO PRESERVATIVE FREE IM: ICD-10-PCS | Mod: S$GLB,,, | Performed by: NURSE PRACTITIONER

## 2019-12-12 PROCEDURE — 99999 PR PBB SHADOW E&M-EST. PATIENT-LVL III: CPT | Mod: PBBFAC,,, | Performed by: NURSE PRACTITIONER

## 2019-12-12 PROCEDURE — 3078F DIAST BP <80 MM HG: CPT | Mod: CPTII,S$GLB,, | Performed by: NURSE PRACTITIONER

## 2019-12-12 PROCEDURE — 3074F SYST BP LT 130 MM HG: CPT | Mod: CPTII,S$GLB,, | Performed by: NURSE PRACTITIONER

## 2019-12-12 PROCEDURE — 90686 IIV4 VACC NO PRSV 0.5 ML IM: CPT | Mod: S$GLB,,, | Performed by: NURSE PRACTITIONER

## 2019-12-12 PROCEDURE — 99396 PREV VISIT EST AGE 40-64: CPT | Mod: 25,S$GLB,, | Performed by: NURSE PRACTITIONER

## 2019-12-12 PROCEDURE — 3078F PR MOST RECENT DIASTOLIC BLOOD PRESSURE < 80 MM HG: ICD-10-PCS | Mod: CPTII,S$GLB,, | Performed by: NURSE PRACTITIONER

## 2019-12-12 PROCEDURE — 90471 IMMUNIZATION ADMIN: CPT | Mod: S$GLB,,, | Performed by: NURSE PRACTITIONER

## 2019-12-12 PROCEDURE — 3074F PR MOST RECENT SYSTOLIC BLOOD PRESSURE < 130 MM HG: ICD-10-PCS | Mod: CPTII,S$GLB,, | Performed by: NURSE PRACTITIONER

## 2019-12-12 PROCEDURE — 90686 FLU VACCINE (QUAD) GREATER THAN OR EQUAL TO 3YO PRESERVATIVE FREE IM: ICD-10-PCS | Mod: S$GLB,,, | Performed by: NURSE PRACTITIONER

## 2019-12-12 PROCEDURE — 99999 PR PBB SHADOW E&M-EST. PATIENT-LVL III: ICD-10-PCS | Mod: PBBFAC,,, | Performed by: NURSE PRACTITIONER

## 2019-12-16 ENCOUNTER — HOSPITAL ENCOUNTER (OUTPATIENT)
Dept: RADIOLOGY | Facility: HOSPITAL | Age: 59
Discharge: HOME | End: 2019-12-16
Attending: FAMILY MEDICINE
Payer: COMMERCIAL

## 2019-12-16 ENCOUNTER — OFFICE VISIT (OUTPATIENT)
Dept: FAMILY MEDICINE | Facility: CLINIC | Age: 59
End: 2019-12-16
Payer: COMMERCIAL

## 2019-12-16 VITALS
TEMPERATURE: 98 F | DIASTOLIC BLOOD PRESSURE: 72 MMHG | BODY MASS INDEX: 44.41 KG/M2 | HEIGHT: 68 IN | SYSTOLIC BLOOD PRESSURE: 118 MMHG | WEIGHT: 293 LBS | HEART RATE: 78 BPM

## 2019-12-16 DIAGNOSIS — Z12.4 ENCOUNTER FOR PAPANICOLAOU SMEAR FOR CERVICAL CANCER SCREENING: Primary | ICD-10-CM

## 2019-12-16 DIAGNOSIS — Z12.39 SCREENING BREAST EXAMINATION: ICD-10-CM

## 2019-12-16 PROCEDURE — 99999 PR PBB SHADOW E&M-EST. PATIENT-LVL III: ICD-10-PCS | Mod: PBBFAC,,, | Performed by: NURSE PRACTITIONER

## 2019-12-16 PROCEDURE — 3078F DIAST BP <80 MM HG: CPT | Mod: CPTII,S$GLB,, | Performed by: NURSE PRACTITIONER

## 2019-12-16 PROCEDURE — 99999 PR PBB SHADOW E&M-EST. PATIENT-LVL III: CPT | Mod: PBBFAC,,, | Performed by: NURSE PRACTITIONER

## 2019-12-16 PROCEDURE — 3008F PR BODY MASS INDEX (BMI) DOCUMENTED: ICD-10-PCS | Mod: CPTII,S$GLB,, | Performed by: NURSE PRACTITIONER

## 2019-12-16 PROCEDURE — 3078F PR MOST RECENT DIASTOLIC BLOOD PRESSURE < 80 MM HG: ICD-10-PCS | Mod: CPTII,S$GLB,, | Performed by: NURSE PRACTITIONER

## 2019-12-16 PROCEDURE — 3074F SYST BP LT 130 MM HG: CPT | Mod: CPTII,S$GLB,, | Performed by: NURSE PRACTITIONER

## 2019-12-16 PROCEDURE — 99213 PR OFFICE/OUTPT VISIT, EST, LEVL III, 20-29 MIN: ICD-10-PCS | Mod: 25,S$GLB,, | Performed by: NURSE PRACTITIONER

## 2019-12-16 PROCEDURE — 87624 HPV HI-RISK TYP POOLED RSLT: CPT

## 2019-12-16 PROCEDURE — 88175 CYTOPATH C/V AUTO FLUID REDO: CPT

## 2019-12-16 PROCEDURE — 3008F BODY MASS INDEX DOCD: CPT | Mod: CPTII,S$GLB,, | Performed by: NURSE PRACTITIONER

## 2019-12-16 PROCEDURE — 3074F PR MOST RECENT SYSTOLIC BLOOD PRESSURE < 130 MM HG: ICD-10-PCS | Mod: CPTII,S$GLB,, | Performed by: NURSE PRACTITIONER

## 2019-12-16 PROCEDURE — 99213 OFFICE O/P EST LOW 20 MIN: CPT | Mod: 25,S$GLB,, | Performed by: NURSE PRACTITIONER

## 2019-12-16 NOTE — PROGRESS NOTES
Subjective:       Patient ID: Chelsie Garrett is a 59 y.o. female.    Chief Complaint: Gynecologic Exam    Gynecologic Exam   The patient's pertinent negatives include no vaginal bleeding or vaginal discharge. The problem has been unchanged. The patient is experiencing no pain. Pertinent negatives include no abdominal pain, diarrhea, fever, headaches, rash, sore throat or vomiting. Nothing aggravates the symptoms. She is sexually active. No, her partner does not have an STD. She uses nothing for contraception. She is postmenopausal.       Review of Systems   Constitutional: Negative for fatigue, fever and unexpected weight change.   HENT: Negative for ear pain and sore throat.    Eyes: Negative for pain and visual disturbance.   Respiratory: Negative for cough and shortness of breath.    Cardiovascular: Negative for chest pain and palpitations.   Gastrointestinal: Negative for abdominal pain, diarrhea and vomiting.   Genitourinary: Negative for vaginal discharge.   Musculoskeletal: Negative for arthralgias and myalgias.   Skin: Negative for color change and rash.   Neurological: Negative for dizziness and headaches.   Psychiatric/Behavioral: Negative for dysphoric mood and sleep disturbance. The patient is not nervous/anxious.        Vitals:    12/16/19 1019   BP: 118/72   Pulse: 78   Temp: 97.9 °F (36.6 °C)       Objective:     Current Outpatient Medications   Medication Sig Dispense Refill    atorvastatin (LIPITOR) 80 MG tablet Take 1 tablet (80 mg total) by mouth once daily. 90 tablet 3    brimonidine-timolol (COMBIGAN) 0.2-0.5 % Drop Place 1 drop into the right eye 2 (two) times daily. 10 mL 0    dulaglutide (TRULICITY) 1.5 mg/0.5 mL PnIj Inject 1.5 mg into the skin every 7 days. 12 Syringe 1    ezetimibe (ZETIA) 10 mg tablet Take 1 tablet (10 mg total) by mouth once daily. 90 tablet 3    insulin (LANTUS SOLOSTAR U-100 INSULIN) glargine 100 units/mL (3mL) SubQ pen INJECT 80 UNITS SQ DAILY EVERY MORNING 75 mL  1    insulin aspart U-100 (NOVOLOG) 100 unit/mL (3 mL) InPn pen Give the following sliding scale three times a day before meals based on pre-meal glucose check: If less than 150, give 3 unit SQ.   If 151-200, give 4 units SQ If 201-250, give 5 units SQ If 251-300, give 6 units SQ If 301-350, give 7 units SQ If 351-400, give 8 units SQ If greater than 400, call MD., 45 mL 1    lisinopril (PRINIVIL,ZESTRIL) 2.5 MG tablet Take 1 tablet (2.5 mg total) by mouth once daily. 90 tablet 3     No current facility-administered medications for this visit.        Physical Exam   Constitutional: She appears well-developed. No distress.   HENT:   Head: Normocephalic and atraumatic.   Eyes: Pupils are equal, round, and reactive to light.   Cardiovascular: Normal rate.   Pulmonary/Chest: Effort normal. Right breast exhibits no mass, no nipple discharge and no skin change. Left breast exhibits no mass, no nipple discharge and no skin change.   Abdominal: She exhibits no distension. There is no tenderness.   Genitourinary: Vagina normal. There is no rash on the right labia. There is no rash on the left labia. Uterus is not tender. Cervix exhibits no motion tenderness, no discharge and no friability. Right adnexum displays no mass and no tenderness. Left adnexum displays no mass and no tenderness.   Skin: Skin is warm and dry.   Psychiatric: She has a normal mood and affect.       Pap smear performed. cervical sample obtained using a brush. Cervix without bleeding or discharge. Vaginal wall with no abnormalities noted. Pt tolerated procedure well. Sample will be sent for pathology.    Assessment:       1. Encounter for Papanicolaou smear for cervical cancer screening        Plan:   Encounter for Papanicolaou smear for cervical cancer screening  -     Liquid-Based Pap Smear, Screening  -     HPV High Risk Genotypes, PCR        No follow-ups on file.    There are no Patient Instructions on file for this visit.

## 2019-12-17 ENCOUNTER — TELEPHONE (OUTPATIENT)
Dept: ADMINISTRATIVE | Facility: HOSPITAL | Age: 59
End: 2019-12-17

## 2019-12-17 NOTE — TELEPHONE ENCOUNTER
Pt states she will be at he r scheduled mammogram appt on Wednesday, 12/18/19 in [place]. lw

## 2019-12-18 ENCOUNTER — HOSPITAL ENCOUNTER (OUTPATIENT)
Dept: RADIOLOGY | Facility: HOSPITAL | Age: 59
Discharge: HOME | End: 2019-12-18
Attending: FAMILY MEDICINE
Payer: COMMERCIAL

## 2019-12-18 VITALS — HEIGHT: 68 IN | BODY MASS INDEX: 44.41 KG/M2 | WEIGHT: 293 LBS

## 2019-12-18 DIAGNOSIS — Z12.39 SCREENING BREAST EXAMINATION: ICD-10-CM

## 2019-12-18 PROCEDURE — 77063 BREAST TOMOSYNTHESIS BI: CPT | Mod: 26,,, | Performed by: RADIOLOGY

## 2019-12-18 PROCEDURE — 77067 SCR MAMMO BI INCL CAD: CPT | Mod: TC,PO

## 2019-12-18 PROCEDURE — 77067 MAMMO DIGITAL SCREENING BILAT WITH TOMOSYNTHESIS_CAD: ICD-10-PCS | Mod: 26,,, | Performed by: RADIOLOGY

## 2019-12-18 PROCEDURE — 77067 SCR MAMMO BI INCL CAD: CPT | Mod: 26,,, | Performed by: RADIOLOGY

## 2019-12-18 PROCEDURE — 77063 MAMMO DIGITAL SCREENING BILAT WITH TOMOSYNTHESIS_CAD: ICD-10-PCS | Mod: 26,,, | Performed by: RADIOLOGY

## 2019-12-20 LAB
HPV HR 12 DNA SPEC QL NAA+PROBE: NEGATIVE
HPV16 AG SPEC QL: NEGATIVE
HPV18 DNA SPEC QL NAA+PROBE: NEGATIVE

## 2020-01-05 LAB
FINAL PATHOLOGIC DIAGNOSIS: NORMAL
Lab: NORMAL

## 2020-01-13 ENCOUNTER — TELEPHONE (OUTPATIENT)
Dept: FAMILY MEDICINE | Facility: CLINIC | Age: 60
End: 2020-01-13

## 2020-01-13 RX ORDER — CODEINE PHOSPHATE AND GUAIFENESIN 10; 100 MG/5ML; MG/5ML
10 SOLUTION ORAL 4 TIMES DAILY PRN
Qty: 240 ML | Refills: 0 | Status: SHIPPED | OUTPATIENT
Start: 2020-01-13 | End: 2020-01-23

## 2020-01-13 NOTE — TELEPHONE ENCOUNTER
----- Message from Randa Edward sent at 1/13/2020 11:12 AM CST -----  Contact: patient   Patient requesting a call back regarding previous message concerning her cough advice .Please call back at [phone]      Thanks,  Randa Edward

## 2020-01-13 NOTE — TELEPHONE ENCOUNTER
I have signed for the following orders AND/OR meds.  Please call the patient and ask the patient to schedule the testing AND/OR inform about any medications that were sent.      No orders of the defined types were placed in this encounter.      Medications Ordered This Encounter   Medications    guaifenesin-codeine 100-10 mg/5 ml (TUSSI-ORGANIDIN NR)  mg/5 mL syrup     Sig: Take 10 mLs by mouth 4 (four) times daily as needed for Cough.     Dispense:  240 mL     Refill:  0

## 2020-01-13 NOTE — TELEPHONE ENCOUNTER
Spoke with pt and let her know that her prior message was sent to Dr. Emmanuel and that he was in clinic this morning. Once he gets into his box and lets us know what he wants to do.

## 2020-01-13 NOTE — TELEPHONE ENCOUNTER
----- Message from Roseann Oakes sent at 1/13/2020 10:07 AM CST -----  Contact: Self- [phone]  .Type:  Needs Medical Advice    Who Called: Chelsie Garrett  Symptoms (please be specific): Sinus drainage/coughing is unmerciful (when coughing it burns)   How long has patient had these symptoms:  2-4 days   Pharmacy name and phone #:  .  [facility] [place] - [id number]  [address] [address] [facility] [address]  Phone: [phone] Fax: [phone]      Would the patient rather a call back or a response via [facility]? Call back   Best Call Back Number:[phone] (home)   Additional Information:     Thank You  Roseann Oakes

## 2020-01-15 ENCOUNTER — OFFICE VISIT (OUTPATIENT)
Dept: FAMILY MEDICINE | Facility: CLINIC | Age: 60
End: 2020-01-15
Payer: COMMERCIAL

## 2020-01-15 VITALS
HEART RATE: 78 BPM | BODY MASS INDEX: 44.41 KG/M2 | SYSTOLIC BLOOD PRESSURE: 128 MMHG | DIASTOLIC BLOOD PRESSURE: 76 MMHG | WEIGHT: 293 LBS | TEMPERATURE: 98 F | HEIGHT: 68 IN

## 2020-01-15 DIAGNOSIS — J02.9 SORE THROAT: Primary | ICD-10-CM

## 2020-01-15 DIAGNOSIS — J02.9 PHARYNGITIS, UNSPECIFIED ETIOLOGY: ICD-10-CM

## 2020-01-15 PROCEDURE — 99999 PR PBB SHADOW E&M-EST. PATIENT-LVL III: CPT | Mod: PBBFAC,,, | Performed by: NURSE PRACTITIONER

## 2020-01-15 PROCEDURE — 99213 OFFICE O/P EST LOW 20 MIN: CPT | Mod: 25,S$GLB,, | Performed by: NURSE PRACTITIONER

## 2020-01-15 PROCEDURE — 3074F SYST BP LT 130 MM HG: CPT | Mod: CPTII,S$GLB,, | Performed by: NURSE PRACTITIONER

## 2020-01-15 PROCEDURE — 3074F PR MOST RECENT SYSTOLIC BLOOD PRESSURE < 130 MM HG: ICD-10-PCS | Mod: CPTII,S$GLB,, | Performed by: NURSE PRACTITIONER

## 2020-01-15 PROCEDURE — 99999 PR PBB SHADOW E&M-EST. PATIENT-LVL III: ICD-10-PCS | Mod: PBBFAC,,, | Performed by: NURSE PRACTITIONER

## 2020-01-15 PROCEDURE — 3008F PR BODY MASS INDEX (BMI) DOCUMENTED: ICD-10-PCS | Mod: CPTII,S$GLB,, | Performed by: NURSE PRACTITIONER

## 2020-01-15 PROCEDURE — 3008F BODY MASS INDEX DOCD: CPT | Mod: CPTII,S$GLB,, | Performed by: NURSE PRACTITIONER

## 2020-01-15 PROCEDURE — 96372 PR INJECTION,THERAP/PROPH/DIAG2ST, IM OR SUBCUT: ICD-10-PCS | Mod: S$GLB,,, | Performed by: NURSE PRACTITIONER

## 2020-01-15 PROCEDURE — 3078F DIAST BP <80 MM HG: CPT | Mod: CPTII,S$GLB,, | Performed by: NURSE PRACTITIONER

## 2020-01-15 PROCEDURE — 99213 PR OFFICE/OUTPT VISIT, EST, LEVL III, 20-29 MIN: ICD-10-PCS | Mod: 25,S$GLB,, | Performed by: NURSE PRACTITIONER

## 2020-01-15 PROCEDURE — 96372 THER/PROPH/DIAG INJ SC/IM: CPT | Mod: S$GLB,,, | Performed by: NURSE PRACTITIONER

## 2020-01-15 PROCEDURE — 3078F PR MOST RECENT DIASTOLIC BLOOD PRESSURE < 80 MM HG: ICD-10-PCS | Mod: CPTII,S$GLB,, | Performed by: NURSE PRACTITIONER

## 2020-01-15 RX ORDER — CEFDINIR 300 MG/1
300 CAPSULE ORAL 2 TIMES DAILY
Qty: 20 CAPSULE | Refills: 0 | Status: SHIPPED | OUTPATIENT
Start: 2020-01-15 | End: 2020-01-15 | Stop reason: SDUPTHER

## 2020-01-15 RX ORDER — METHYLPREDNISOLONE ACETATE 40 MG/ML
40 INJECTION, SUSPENSION INTRA-ARTICULAR; INTRALESIONAL; INTRAMUSCULAR; SOFT TISSUE ONCE
Status: DISCONTINUED | OUTPATIENT
Start: 2020-01-15 | End: 2020-01-15

## 2020-01-15 RX ORDER — METHYLPREDNISOLONE ACETATE 80 MG/ML
40 INJECTION, SUSPENSION INTRA-ARTICULAR; INTRALESIONAL; INTRAMUSCULAR; SOFT TISSUE ONCE
Status: COMPLETED | OUTPATIENT
Start: 2020-01-15 | End: 2020-01-15

## 2020-01-15 RX ORDER — CEFDINIR 300 MG/1
300 CAPSULE ORAL 2 TIMES DAILY
Qty: 20 CAPSULE | Refills: 0 | Status: SHIPPED | OUTPATIENT
Start: 2020-01-15 | End: 2020-01-25

## 2020-01-15 RX ADMIN — METHYLPREDNISOLONE ACETATE 40 MG: 80 INJECTION, SUSPENSION INTRA-ARTICULAR; INTRALESIONAL; INTRAMUSCULAR; SOFT TISSUE at 11:01

## 2020-01-15 NOTE — PROGRESS NOTES
Subjective:       Patient ID: Chelsie Garrett is a 59 y.o. female.    Chief Complaint: Cough and Sinus Problem    Cough   This is a new problem. The current episode started in the past 7 days. The problem has been rapidly worsening. The problem occurs constantly. The cough is non-productive. Associated symptoms include ear congestion, ear pain, a fever, myalgias, nasal congestion, postnasal drip, rhinorrhea and a sore throat. Pertinent negatives include no chest pain, headaches, rash or shortness of breath. She has tried prescription cough suppressant for the symptoms. The treatment provided no relief.       Review of Systems   Constitutional: Positive for fever. Negative for fatigue and unexpected weight change.   HENT: Positive for congestion, ear pain, postnasal drip, rhinorrhea and sore throat.    Eyes: Negative for pain and visual disturbance.   Respiratory: Positive for cough. Negative for shortness of breath.    Cardiovascular: Negative for chest pain and palpitations.   Gastrointestinal: Negative for abdominal pain, diarrhea and vomiting.   Musculoskeletal: Positive for myalgias. Negative for arthralgias.   Skin: Negative for color change and rash.   Neurological: Negative for dizziness and headaches.   Psychiatric/Behavioral: Negative for dysphoric mood and sleep disturbance. The patient is not nervous/anxious.        Vitals:    01/15/20 0957   BP: 128/76   Pulse: 78   Temp: 98.2 °F (36.8 °C)       Objective:     Current Outpatient Medications   Medication Sig Dispense Refill    atorvastatin (LIPITOR) 80 MG tablet Take 1 tablet (80 mg total) by mouth once daily. 90 tablet 3    brimonidine-timolol (COMBIGAN) 0.2-0.5 % Drop Place 1 drop into the right eye 2 (two) times daily. 10 mL 0    dulaglutide (TRULICITY) 1.5 mg/0.5 mL PnIj Inject 1.5 mg into the skin every 7 days. 12 Syringe 1    ezetimibe (ZETIA) 10 mg tablet Take 1 tablet (10 mg total) by mouth once daily. 90 tablet 3    guaifenesin-codeine 100-10  mg/5 ml (TUSSI-ORGANIDIN NR)  mg/5 mL syrup Take 10 mLs by mouth 4 (four) times daily as needed for Cough. 240 mL 0    insulin (LANTUS SOLOSTAR U-100 INSULIN) glargine 100 units/mL (3mL) SubQ pen INJECT 80 UNITS SQ DAILY EVERY MORNING 75 mL 1    insulin aspart U-100 (NOVOLOG) 100 unit/mL (3 mL) InPn pen Give the following sliding scale three times a day before meals based on pre-meal glucose check: If less than 150, give 3 unit SQ.   If 151-200, give 4 units SQ If 201-250, give 5 units SQ If 251-300, give 6 units SQ If 301-350, give 7 units SQ If 351-400, give 8 units SQ If greater than 400, call MD., 45 mL 1    lisinopril (PRINIVIL,ZESTRIL) 2.5 MG tablet Take 1 tablet (2.5 mg total) by mouth once daily. 90 tablet 3    cefdinir (OMNICEF) 300 MG capsule Take 1 capsule (300 mg total) by mouth 2 (two) times daily. for 10 days 20 capsule 0     No current facility-administered medications for this visit.        Physical Exam   Constitutional: She is oriented to person, place, and time. She appears well-developed and well-nourished. No distress.   HENT:   Head: Normocephalic and atraumatic.   Right Ear: Tympanic membrane normal.   Left Ear: Tympanic membrane normal.   Nose: Mucosal edema and rhinorrhea present.   Mouth/Throat: Posterior oropharyngeal edema and posterior oropharyngeal erythema present.   Eyes: Pupils are equal, round, and reactive to light. EOM are normal.   Neck: Normal range of motion. Neck supple.   Cardiovascular: Normal rate and regular rhythm.   Pulmonary/Chest: Effort normal. She has wheezes.   Loose cough   Musculoskeletal: Normal range of motion.   Neurological: She is alert and oriented to person, place, and time.   Skin: Skin is warm and dry. No rash noted.   Psychiatric: She has a normal mood and affect. Judgment normal.   Nursing note and vitals reviewed.      Assessment:       1. Sore throat    2. Pharyngitis, unspecified etiology        Plan:       Pharyngitis, unspecified  etiology  -     methylPREDNISolone acetate injection 40 mg    Other orders  -     Discontinue: cefdinir (OMNICEF) 300 MG capsule; Take 1 capsule (300 mg total) by mouth 2 (two) times daily. for 10 days  Dispense: 20 capsule; Refill: 0  -     cefdinir (OMNICEF) 300 MG capsule; Take 1 capsule (300 mg total) by mouth 2 (two) times daily. for 10 days  Dispense: 20 capsule; Refill: 0        No follow-ups on file.    There are no Patient Instructions on file for this visit.

## 2020-01-31 ENCOUNTER — PATIENT MESSAGE (OUTPATIENT)
Dept: FAMILY MEDICINE | Facility: CLINIC | Age: 60
End: 2020-01-31

## 2020-01-31 RX ORDER — FLUCONAZOLE 150 MG/1
TABLET ORAL
Qty: 2 TABLET | Refills: 0 | Status: SHIPPED | OUTPATIENT
Start: 2020-01-31 | End: 2020-08-17

## 2020-02-01 ENCOUNTER — PATIENT MESSAGE (OUTPATIENT)
Dept: FAMILY MEDICINE | Facility: CLINIC | Age: 60
End: 2020-02-01

## 2020-02-12 ENCOUNTER — PATIENT MESSAGE (OUTPATIENT)
Dept: FAMILY MEDICINE | Facility: CLINIC | Age: 60
End: 2020-02-12

## 2020-03-02 ENCOUNTER — PATIENT MESSAGE (OUTPATIENT)
Dept: FAMILY MEDICINE | Facility: CLINIC | Age: 60
End: 2020-03-02

## 2020-03-06 RX ORDER — CEFDINIR 300 MG/1
CAPSULE ORAL
Qty: 20 CAPSULE | Refills: 0 | OUTPATIENT
Start: 2020-03-06

## 2020-03-06 RX ORDER — FLUCONAZOLE 150 MG/1
TABLET ORAL
Qty: 2 TABLET | Refills: 0 | OUTPATIENT
Start: 2020-03-06

## 2020-03-17 ENCOUNTER — PATIENT MESSAGE (OUTPATIENT)
Dept: FAMILY MEDICINE | Facility: CLINIC | Age: 60
End: 2020-03-17

## 2020-06-25 ENCOUNTER — PATIENT MESSAGE (OUTPATIENT)
Dept: FAMILY MEDICINE | Facility: CLINIC | Age: 60
End: 2020-06-25

## 2020-06-25 RX ORDER — VARENICLINE TARTRATE 0.5 (11)-1
KIT ORAL
Qty: 1 PACKAGE | Refills: 0 | Status: SHIPPED | OUTPATIENT
Start: 2020-06-25 | End: 2022-10-09

## 2020-07-20 ENCOUNTER — NURSE TRIAGE (OUTPATIENT)
Dept: ADMINISTRATIVE | Facility: CLINIC | Age: 60
End: 2020-07-20

## 2020-07-20 NOTE — TELEPHONE ENCOUNTER
Patient states she does not know why I am calling, she just call because she thought she was supposed to, Covid 19 triage done, states she already has an appointment in the am with Dr. Emmanuel and will ask him to give her a test, no further questions or concerns noted at this time     Reason for Disposition   [1] Symptoms of COVID-19 (e.g., cough, fever, SOB, or others) AND [2] lives in an area with community spread   COVID-19 Testing, questions about    Additional Information   Negative: COVID-19 has been diagnosed by a healthcare provider (HCP)   Negative: COVID-19 lab test positive   Negative: SEVERE difficulty breathing (e.g., struggling for each breath, speaks in single words)   Negative: Difficult to awaken or acting confused (e.g., disoriented, slurred speech)   Negative: Bluish (or gray) lips or face now   Negative: Shock suspected (e.g., cold/pale/clammy skin, too weak to stand, low BP, rapid pulse)   Negative: Sounds like a life-threatening emergency to the triager   Negative: [1] COVID-19 exposure AND [2] NO symptoms   Negative: COVID-19 and Breastfeeding, questions about   Negative: [1] Adult with possible COVID-19 symptoms AND [2] triager concerned about severity of symptoms or other causes   Negative: SEVERE or constant chest pain or pressure (Exception: mild central chest pain, present only when coughing)   Negative: MODERATE difficulty breathing (e.g., speaks in phrases, SOB even at rest, pulse 100-120)   Negative: MILD difficulty breathing (e.g., minimal/no SOB at rest, SOB with walking, pulse <100)   Negative: Chest pain   Negative: Patient sounds very sick or weak to the triager   Negative: Fever > 103 F (39.4 C)   Negative: [1] Fever > 101 F (38.3 C) AND [2] age > 60   Negative: [1] Fever > 100.0 F (37.8 C) AND [2] bedridden (e.g., nursing home patient, CVA, chronic illness, recovering from surgery)   Negative: HIGH RISK patient (e.g., age > 64 years, diabetes, heart or lung  disease, weak immune system)   Negative: [1] COVID-19 infection suspected by caller or triager AND [2] mild symptoms (cough, fever, or others) AND [3] no complications or SOB   Negative: Fever present > 3 days (72 hours)   Negative: [1] Fever returns after gone for over 24 hours AND [2] symptoms worse or not improved   Negative: [1] Continuous (nonstop) coughing interferes with work or school AND [2] no improvement using cough treatment per protocol   Negative: Cough present > 3 weeks   Negative: [1] COVID-19 diagnosed by positive lab test AND [2] mild symptoms (e.g., cough, fever, others) AND [3] no complications or SOB   Negative: [1] COVID-19 diagnosed by HCP (doctor, NP or PA) AND [2] mild symptoms (e.g., cough, fever, others) AND [3] no complications or SOB   Negative: COVID-19 Home Isolation, questions about    Protocols used: CORONAVIRUS (COVID-19) EXPOSURE-A-OH, CORONAVIRUS (COVID-19) DIAGNOSED OR [id number]

## 2020-07-21 ENCOUNTER — OFFICE VISIT (OUTPATIENT)
Dept: FAMILY MEDICINE | Facility: CLINIC | Age: 60
End: 2020-07-21
Payer: COMMERCIAL

## 2020-07-21 VITALS
WEIGHT: 293 LBS | SYSTOLIC BLOOD PRESSURE: 130 MMHG | TEMPERATURE: 98 F | DIASTOLIC BLOOD PRESSURE: 69 MMHG | HEART RATE: 77 BPM | BODY MASS INDEX: 47.29 KG/M2

## 2020-07-21 DIAGNOSIS — R05.9 COUGH: ICD-10-CM

## 2020-07-21 DIAGNOSIS — J06.9 UPPER RESPIRATORY TRACT INFECTION, UNSPECIFIED TYPE: Primary | ICD-10-CM

## 2020-07-21 DIAGNOSIS — R51.9 HEAD ACHE: ICD-10-CM

## 2020-07-21 PROCEDURE — 3078F PR MOST RECENT DIASTOLIC BLOOD PRESSURE < 80 MM HG: ICD-10-PCS | Mod: CPTII,S$GLB,, | Performed by: FAMILY MEDICINE

## 2020-07-21 PROCEDURE — 3008F BODY MASS INDEX DOCD: CPT | Mod: CPTII,S$GLB,, | Performed by: FAMILY MEDICINE

## 2020-07-21 PROCEDURE — 99999 PR PBB SHADOW E&M-EST. PATIENT-LVL III: CPT | Mod: PBBFAC,,, | Performed by: FAMILY MEDICINE

## 2020-07-21 PROCEDURE — 99213 PR OFFICE/OUTPT VISIT, EST, LEVL III, 20-29 MIN: ICD-10-PCS | Mod: S$GLB,,, | Performed by: FAMILY MEDICINE

## 2020-07-21 PROCEDURE — 3008F PR BODY MASS INDEX (BMI) DOCUMENTED: ICD-10-PCS | Mod: CPTII,S$GLB,, | Performed by: FAMILY MEDICINE

## 2020-07-21 PROCEDURE — 3078F DIAST BP <80 MM HG: CPT | Mod: CPTII,S$GLB,, | Performed by: FAMILY MEDICINE

## 2020-07-21 PROCEDURE — U0003 INFECTIOUS AGENT DETECTION BY NUCLEIC ACID (DNA OR RNA); SEVERE ACUTE RESPIRATORY SYNDROME CORONAVIRUS 2 (SARS-COV-2) (CORONAVIRUS DISEASE [COVID-19]), AMPLIFIED PROBE TECHNIQUE, MAKING USE OF HIGH THROUGHPUT TECHNOLOGIES AS DESCRIBED BY CMS-2020-01-R: HCPCS

## 2020-07-21 PROCEDURE — 99213 OFFICE O/P EST LOW 20 MIN: CPT | Mod: S$GLB,,, | Performed by: FAMILY MEDICINE

## 2020-07-21 PROCEDURE — 3075F SYST BP GE 130 - 139MM HG: CPT | Mod: CPTII,S$GLB,, | Performed by: FAMILY MEDICINE

## 2020-07-21 PROCEDURE — 99999 PR PBB SHADOW E&M-EST. PATIENT-LVL III: ICD-10-PCS | Mod: PBBFAC,,, | Performed by: FAMILY MEDICINE

## 2020-07-21 PROCEDURE — 3075F PR MOST RECENT SYSTOLIC BLOOD PRESS GE 130-139MM HG: ICD-10-PCS | Mod: CPTII,S$GLB,, | Performed by: FAMILY MEDICINE

## 2020-07-21 NOTE — PROGRESS NOTES
Chelsie Garrett presents with 2 days hx malaise myalgia HA and moderate upper respiratory congestion,rhinnorhea,slight cough p  Denies dyspnea nausea,vomiting,diarrhea or significant fever.    Past Medical History:   Diagnosis Date    Cigarette nicotine dependence without complication 2017    DM (diabetes mellitus) type II uncontrolled with eye manifestation     Nicotine addiction      Past Surgical History:   Procedure Laterality Date     SECTION       Review of patient's allergies indicates:   Allergen Reactions    Penicillins Anaphylaxis     Current Outpatient Medications on File Prior to Visit   Medication Sig Dispense Refill    atorvastatin (LIPITOR) 80 MG tablet Take 1 tablet (80 mg total) by mouth once daily. 90 tablet 3    brimonidine-timolol (COMBIGAN) 0.2-0.5 % Drop Place 1 drop into the right eye 2 (two) times daily. 10 mL 0    ezetimibe (ZETIA) 10 mg tablet Take 1 tablet (10 mg total) by mouth once daily. 90 tablet 3    insulin (LANTUS SOLOSTAR U-100 INSULIN) glargine 100 units/mL (3mL) SubQ pen INJECT 80 UNITS SQ DAILY EVERY MORNING 75 mL 1    insulin aspart U-100 (NOVOLOG) 100 unit/mL (3 mL) InPn pen Give the following sliding scale three times a day before meals based on pre-meal glucose check: If less than 150, give 3 unit SQ.   If 151-200, give 4 units SQ If 201-250, give 5 units SQ If 251-300, give 6 units SQ If 301-350, give 7 units SQ If 351-400, give 8 units SQ If greater than 400, call MD., 45 mL 1    lisinopril (PRINIVIL,ZESTRIL) 2.5 MG tablet Take 1 tablet (2.5 mg total) by mouth once daily. 90 tablet 3    varenicline (CHANTIX STARTING MONTH BOX) 0.5 mg (11)- 1 mg (42) tablet Take one 0.5mg tab by mouth once daily X3 days,then increase to one 0.5mg tab twice daily X4 days,then increase to one 1mg tab twice daily 1 Package 0    dulaglutide (TRULICITY) 1.5 mg/0.5 mL PnIj Inject 1.5 mg into the skin every 7 days. (Patient not taking: Reported on 2020) 12 Syringe 1     fluconazole (DIFLUCAN) 150 MG Tab Take 1 tablet PO today, may repeat in 3 days (Patient not taking: Reported on 7/21/2020) 2 tablet 0     No current facility-administered medications on file prior to visit.      Social History     Socioeconomic History    Marital status:      Spouse name: Not on file    Number of children: Not on file    Years of education: Not on file    Highest education level: Not on file   Occupational History     Employer: [employer]   Social Needs    Financial resource strain: Not on file    Food insecurity     Worry: Not on file     Inability: Not on file    Transportation needs     Medical: Not on file     Non-medical: Not on file   Tobacco Use    Smoking status: Current Every Day Smoker     Packs/day: 1.50     Years: 37.00     Pack years: 55.50     Types: Cigarettes    Smokeless tobacco: Never Used    Tobacco comment: Quit smoking 10/5/17. 7/30/18 Lapsed. 8/27/18 Quit smoking.   Substance and Sexual Activity    Alcohol use: Yes    Drug use: No    Sexual activity: Not on file   Lifestyle    Physical activity     Days per week: Not on file     Minutes per session: Not on file    Stress: Not on file   Relationships    Social connections     Talks on phone: Not on file     Gets together: Not on file     Attends [employer] service: Not on file     Active member of club or organization: Not on file     Attends meetings of clubs or organizations: Not on file     Relationship status: Not on file   Other Topics Concern    Not on file   Social History Narrative    Not on file     Family History   Problem Relation Age of Onset    Cancer Mother         pancreatic    Cancer Father         lung    Stroke Father     Coronary artery disease Father     Diabetes Father     Hypertension Father     Bladder Cancer Paternal Uncle     Lymphoma Paternal Uncle          ROS:  SKIN: No rashes, itching or changes in color or texture of skin.  EYES: Visual acuity fine.  No photophobia, ocular pain or diplopia.EARS: Denies ear pain, discharge or vertigo.NOSE: No loss of smell, no epistaxis some postnasal drip.MOUTH & THROAT: No hoarseness or change in voice. No excessive gum bleeding.CHEST: Denies EASON, cyanosis, wheezing  CARDIOVASCULAR: Denies chest pain, PND, orthopnea or reduced exercise tolerance.  ABDOMEN:  No weight loss.No abdominal pain, no hematemesis or blood in stool.  URINARY: No flank pain, dysuria or hematuria.  PERIPHERAL VASCULAR: No claudication or cyanosis.  MUSCULOSKELETAL: Negative   NEUROLOGIC: No history of seizures, paralysis, alteration of gait or coordination.    PE: Vital signs as noted  Heent:Normocephalic with no recent cranial trauma,PERRLA,EOMI,conjunctiva clear,fundi reveal no hemmorhage exudate or papilledema.Otic canals clear, tympanic membranes slightly dull bilaterally.Nasal mucosa slightly red and edematous.Posterior pharynx slightly red but without exudate.  Neck:Supple with minimal anterior cervical adenopathy.  Chest:Clear bilateral breath sounds    Heart:Regular rhthym without murmer  Abdomen:Soft, non tender,no masses, no hepatosplenomegalyExtremeties and Neurologic:Grossly within normal limits  Impression: Upper Respiratory Infection. 465.9  Plan: Covid screen  Consider cautious steroids-note DM

## 2020-07-22 ENCOUNTER — PATIENT OUTREACH (OUTPATIENT)
Dept: ADMINISTRATIVE | Facility: HOSPITAL | Age: 60
End: 2020-07-22

## 2020-07-22 NOTE — PROGRESS NOTES
HA1c [employer] Report: Pt HA1c out of range Attempted to reach out to Pt to schedule appt for HA1c. Pt did not answer left voicemail with callback number. Pt has appt with PCP on 8/17/2020, portal msg sent.

## 2020-07-24 LAB — SARS-COV-2 RNA RESP QL NAA+PROBE: NOT DETECTED

## 2020-07-30 ENCOUNTER — TELEPHONE (OUTPATIENT)
Dept: FAMILY MEDICINE | Facility: CLINIC | Age: 60
End: 2020-07-30

## 2020-07-30 NOTE — TELEPHONE ENCOUNTER
----- Message from Genevieve Mahan sent at 7/30/2020  3:01 PM CDT -----  Regarding: calling for COVID RESULTS  Contact: PATIENT  PLEASE CALL PATIENT @ [phone]. THANKS

## 2020-07-31 RX ORDER — INSULIN ASPART 100 [IU]/ML
INJECTION, SOLUTION INTRAVENOUS; SUBCUTANEOUS
Qty: 45 ML | Refills: 0 | Status: SHIPPED | OUTPATIENT
Start: 2020-07-31 | End: 2020-08-17 | Stop reason: SDUPTHER

## 2020-07-31 NOTE — TELEPHONE ENCOUNTER
I refilled the requested medication x 1 month.    The patient is due for an visit in the office.  Call the patient on the phone and book the patient with Sara Barraza NP for a visit.     PLEASE DOCUMENT THE FACT THAT YOU HAVE CONTACTED THE PATIENT IN THE CHART FOR FUTURE REFERENCE.    Health Maintenance Due   Topic Date Due    HIV Screening  07/10/1976    Shingles Vaccine (1 of 2) 07/10/2011    LDCT Lung Screen  07/10/2016    Hemoglobin A1c  11/12/2019    Colorectal Cancer Screening  02/05/2020    Eye Exam  03/13/2020

## 2020-07-31 NOTE — TELEPHONE ENCOUNTER
----- Message from Kalani Dunlap sent at 7/31/2020 11:25 AM CDT -----  Contact: Self [phone]  Type: Patient Call Back    Who called: Self    What is the request in detail: pt is calling because she is requesting an additional box of NOVOLOG because she has been using it more and she is out. Pt pharmacy   [facility] [address] [facility]  [address] [facility] [address]  Phone: [phone] Fax: [phone]     Can the clinic reply by [facility]? Call back    Would the patient rather a call back or a response via [facility]? Call back    Best call back number: [phone]

## 2020-08-05 ENCOUNTER — TELEPHONE (OUTPATIENT)
Dept: FAMILY MEDICINE | Facility: CLINIC | Age: 60
End: 2020-08-05

## 2020-08-05 RX ORDER — TRIAMCINOLONE ACETONIDE 0.25 MG/G
CREAM TOPICAL 2 TIMES DAILY PRN
Qty: 30 G | Refills: 2 | Status: SHIPPED | OUTPATIENT
Start: 2020-08-05 | End: 2021-06-15

## 2020-08-05 NOTE — TELEPHONE ENCOUNTER
----- Message from Irasema Mcmahan sent at 8/5/2020 10:34 AM CDT -----  Regarding: med  Contact: patient  Requesting prescription be sent [facility] [address] [facility] [address] [facility] [facility] [phone]

## 2020-08-05 NOTE — TELEPHONE ENCOUNTER
----- Message from Priscila Santoro sent at 8/5/2020 10:01 AM CDT -----  Regarding: pharmacy location  Contact: pt  Pt needs to speak with office regarding medication sent to local pharmacy and pt is out of town. [phone]

## 2020-08-05 NOTE — TELEPHONE ENCOUNTER
Pt requesting Triamcinolone cream that she has used in the past be sent to pharmacy in [place]. She is going out of town and has a rash.

## 2020-08-05 NOTE — TELEPHONE ENCOUNTER
Patient is traveling out of town and she needs a refill on her Triamcinolone Cream for a rash. She will call back with name and address of pharmacy to send medication to.

## 2020-08-07 ENCOUNTER — TELEPHONE (OUTPATIENT)
Dept: ENDOSCOPY | Facility: HOSPITAL | Age: 60
End: 2020-08-07

## 2020-08-17 ENCOUNTER — TELEPHONE (OUTPATIENT)
Dept: FAMILY MEDICINE | Facility: CLINIC | Age: 60
End: 2020-08-17

## 2020-08-17 ENCOUNTER — TELEPHONE (OUTPATIENT)
Dept: ENDOSCOPY | Facility: HOSPITAL | Age: 60
End: 2020-08-17

## 2020-08-17 ENCOUNTER — OFFICE VISIT (OUTPATIENT)
Dept: FAMILY MEDICINE | Facility: CLINIC | Age: 60
End: 2020-08-17
Payer: COMMERCIAL

## 2020-08-17 VITALS
BODY MASS INDEX: 44.41 KG/M2 | TEMPERATURE: 99 F | HEART RATE: 74 BPM | WEIGHT: 293 LBS | DIASTOLIC BLOOD PRESSURE: 78 MMHG | HEIGHT: 68 IN | SYSTOLIC BLOOD PRESSURE: 136 MMHG

## 2020-08-17 DIAGNOSIS — R53.83 FATIGUE, UNSPECIFIED TYPE: ICD-10-CM

## 2020-08-17 DIAGNOSIS — E66.01 MORBID OBESITY: ICD-10-CM

## 2020-08-17 DIAGNOSIS — E11.49 TYPE II DIABETES MELLITUS WITH NEUROLOGICAL MANIFESTATIONS: ICD-10-CM

## 2020-08-17 DIAGNOSIS — E78.2 COMBINED HYPERLIPIDEMIA ASSOCIATED WITH TYPE 2 DIABETES MELLITUS: ICD-10-CM

## 2020-08-17 DIAGNOSIS — Z12.2 ENCOUNTER FOR SCREENING FOR MALIGNANT NEOPLASM OF RESPIRATORY ORGANS: ICD-10-CM

## 2020-08-17 DIAGNOSIS — E11.69 COMBINED HYPERLIPIDEMIA ASSOCIATED WITH TYPE 2 DIABETES MELLITUS: ICD-10-CM

## 2020-08-17 DIAGNOSIS — Z72.0 TOBACCO ABUSE DISORDER: ICD-10-CM

## 2020-08-17 PROBLEM — H43.10 VITREOUS HEMORRHAGE: Status: RESOLVED | Noted: 2017-04-04 | Resolved: 2020-08-17

## 2020-08-17 PROCEDURE — 3075F SYST BP GE 130 - 139MM HG: CPT | Mod: CPTII,S$GLB,, | Performed by: FAMILY MEDICINE

## 2020-08-17 PROCEDURE — 3075F PR MOST RECENT SYSTOLIC BLOOD PRESS GE 130-139MM HG: ICD-10-PCS | Mod: CPTII,S$GLB,, | Performed by: FAMILY MEDICINE

## 2020-08-17 PROCEDURE — 3078F PR MOST RECENT DIASTOLIC BLOOD PRESSURE < 80 MM HG: ICD-10-PCS | Mod: CPTII,S$GLB,, | Performed by: FAMILY MEDICINE

## 2020-08-17 PROCEDURE — 99999 PR PBB SHADOW E&M-EST. PATIENT-LVL IV: ICD-10-PCS | Mod: PBBFAC,,, | Performed by: FAMILY MEDICINE

## 2020-08-17 PROCEDURE — 3008F BODY MASS INDEX DOCD: CPT | Mod: CPTII,S$GLB,, | Performed by: FAMILY MEDICINE

## 2020-08-17 PROCEDURE — 99214 OFFICE O/P EST MOD 30 MIN: CPT | Mod: S$GLB,,, | Performed by: FAMILY MEDICINE

## 2020-08-17 PROCEDURE — 3008F PR BODY MASS INDEX (BMI) DOCUMENTED: ICD-10-PCS | Mod: CPTII,S$GLB,, | Performed by: FAMILY MEDICINE

## 2020-08-17 PROCEDURE — 3078F DIAST BP <80 MM HG: CPT | Mod: CPTII,S$GLB,, | Performed by: FAMILY MEDICINE

## 2020-08-17 PROCEDURE — 99999 PR PBB SHADOW E&M-EST. PATIENT-LVL IV: CPT | Mod: PBBFAC,,, | Performed by: FAMILY MEDICINE

## 2020-08-17 PROCEDURE — 99214 PR OFFICE/OUTPT VISIT, EST, LEVL IV, 30-39 MIN: ICD-10-PCS | Mod: S$GLB,,, | Performed by: FAMILY MEDICINE

## 2020-08-17 RX ORDER — VARENICLINE TARTRATE 0.5 (11)-1
KIT ORAL
Qty: 1 PACKAGE | Refills: 0 | Status: SHIPPED | OUTPATIENT
Start: 2020-08-17 | End: 2022-10-09

## 2020-08-17 RX ORDER — VARENICLINE TARTRATE 1 MG/1
1 TABLET, FILM COATED ORAL 2 TIMES DAILY
Qty: 60 TABLET | Refills: 1 | Status: SHIPPED | OUTPATIENT
Start: 2020-09-14 | End: 2020-11-17

## 2020-08-17 RX ORDER — EZETIMIBE 10 MG/1
10 TABLET ORAL DAILY
Qty: 90 TABLET | Refills: 3 | Status: SHIPPED | OUTPATIENT
Start: 2020-08-17 | End: 2021-03-24

## 2020-08-17 RX ORDER — INSULIN ASPART 100 [IU]/ML
INJECTION, SOLUTION INTRAVENOUS; SUBCUTANEOUS
Qty: 45 ML | Refills: 0 | Status: SHIPPED | OUTPATIENT
Start: 2020-08-17 | End: 2020-12-14

## 2020-08-17 RX ORDER — INSULIN GLARGINE 100 [IU]/ML
INJECTION, SOLUTION SUBCUTANEOUS
Qty: 75 ML | Refills: 1 | Status: SHIPPED | OUTPATIENT
Start: 2020-08-17 | End: 2020-12-15 | Stop reason: SDUPTHER

## 2020-08-17 RX ORDER — LISINOPRIL 2.5 MG/1
2.5 TABLET ORAL DAILY
Qty: 90 TABLET | Refills: 3 | Status: SHIPPED | OUTPATIENT
Start: 2020-08-17 | End: 2021-03-24

## 2020-08-17 RX ORDER — ATORVASTATIN CALCIUM 80 MG/1
80 TABLET, FILM COATED ORAL DAILY
Qty: 90 TABLET | Refills: 3 | Status: SHIPPED | OUTPATIENT
Start: 2020-08-17 | End: 2021-03-24

## 2020-08-17 RX ORDER — SODIUM, POTASSIUM,MAG SULFATES 17.5-3.13G
SOLUTION, RECONSTITUTED, ORAL ORAL
Qty: 354 ML | Refills: 0 | Status: SHIPPED | OUTPATIENT
Start: 2020-08-17 | End: 2021-06-15

## 2020-08-17 RX ORDER — DULAGLUTIDE 1.5 MG/.5ML
1.5 INJECTION, SOLUTION SUBCUTANEOUS
Qty: 12 PEN | Refills: 1 | Status: SHIPPED | OUTPATIENT
Start: 2020-08-17 | End: 2020-12-29

## 2020-08-17 NOTE — PROGRESS NOTES
Subjective:      Patient ID: Chelsie Garrett is a 60 y.o. female.    Chief Complaint: Annual Exam    HPI  She has complaints of her glucoses being 150 in the AM and 300's in the afternoon.   Lab Results   Component Value Date    HGBA1C 7.6 (H) 08/12/2019     She is using her quick acting insulin up to 10 u tid of that because it is so high.  She starts with 6 a lot of times and then adds 5 later.  She has been off of trulicity for some time now.  This is something that she is willing to do.    Problem List Items Addressed This Visit     DM (diabetes mellitus) type II uncontrolled with eye manifestation    Overview     The patient presents with diabetes.  The patient denies polyuria, polydipsia, polyphagia, hypoglycemia and paresthesias.  The patient's glucose control has been good.  Home glucose averages are routinely checked.  The patient is without retinopathy currently.  The patient has no history of neuropathy.  The patient currently complains of no podiatric problems.  The patient has excellent compliance.  Hemoglobin A1C   Date Value Ref Range Status   08/12/2019 7.6 (H) 4.0 - 5.6 % Final     Comment:     ADA Screening Guidelines:  5.7-6.4%  Consistent with prediabetes  >or=6.5%  Consistent with diabetes  High levels of fetal hemoglobin interfere with the HbA1C  assay. Heterozygous hemoglobin variants (HbS, HgC, etc)do  not significantly interfere with this assay.   However, presence of multiple variants may affect accuracy.     05/13/2019 8.5 (H) 4.0 - 5.6 % Final     Comment:     ADA Screening Guidelines:  5.7-6.4%  Consistent with prediabetes  >or=6.5%  Consistent with diabetes  High levels of fetal hemoglobin interfere with the HbA1C  assay. Heterozygous hemoglobin variants (HbS, HgC, etc)do  not significantly interfere with this assay.   However, presence of multiple variants may affect accuracy.     12/17/2018 8.1 (H) 4.0 - 5.6 % Final     Comment:     ADA Screening Guidelines:  5.7-6.4%  Consistent with  prediabetes  >or=6.5%  Consistent with diabetes  High levels of fetal hemoglobin interfere with the HbA1C  assay. Heterozygous hemoglobin variants (HbS, HgC, etc)do  not significantly interfere with this assay.   However, presence of multiple variants may affect accuracy.       No results found for: MICROALBUR, [id number]  Diabetes Management Status    Statin: Taking  ACE/ARB: Taking    Screening or Prevention Patient's value Goal Complete/Controlled?   HgA1C Testing and Control   Lab Results   Component Value Date    HGBA1C 7.6 (H) 08/12/2019      Annually/Less than 8% Yes   Lipid profile : 08/12/2019 Annually Yes   LDL control Lab Results   Component Value Date    LDLCALC 149.0 08/12/2019    Annually/Less than 100 mg/dl  No   Nephropathy screening Lab Results   Component Value Date    LABMICR 37.0 05/15/2015     No results found for: PROTEINUA Annually No   Blood pressure BP Readings from Last 1 Encounters:   08/17/20 (!) 150/80    Less than 140/90 No   Dilated retinal exam : 03/13/2019 Annually Yes   Foot exam   : 12/12/2019 Annually No              Morbid obesity    Overview     The patient presents with obesity.  Denies bulimia, amenorrhea, cold intolerance, edema, hip pain, hirsutism, knee pain, polydipsia, polyuria, thirst and weakness.  The patient does not perform regular exercise.  Previous treatments for obesity :self-directed dieting with success.  The patient and I discussed the importance of exercise.    She has continued to gain weight.    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, with long-term current use of  insulin    -  Primary    Encounter for screening for malignant neoplasm of respiratory organs        Combined hyperlipidemia associated with type 2 diabetes mellitus        Fatigue, unspecified type          ]    Health Maintenance Due   Topic Date Due    HIV Screening  07/10/1976    Shingles Vaccine (1 of 2) 07/10/2011    LDCT Lung Screen  07/10/2016    Hemoglobin A1c  2019    Colorectal Cancer Screening  2020    Eye Exam  2020    Lipid Panel  2020       Past Medical History:  Past Medical History:   Diagnosis Date    Cigarette nicotine dependence without complication 2017    DM (diabetes mellitus) type II uncontrolled with eye manifestation     Nicotine addiction      Past Surgical History:   Procedure Laterality Date     SECTION       Review of patient's allergies indicates:   Allergen Reactions    Penicillins Anaphylaxis     Current Outpatient Medications on File Prior to Visit   Medication Sig Dispense Refill    atorvastatin (LIPITOR) 80 MG tablet Take 1 tablet (80 mg total) by mouth once daily. 90 tablet 3    brimonidine-timolol (COMBIGAN) 0.2-0.5 % Drop Place 1 drop into the right eye 2 (two) times daily. 10 mL 0    ezetimibe (ZETIA) 10 mg tablet Take 1 tablet (10 mg total) by mouth once daily. 90 tablet 3    insulin (LANTUS SOLOSTAR U-100 INSULIN) glargine 100 units/mL (3mL) SubQ pen INJECT 80 UNITS SQ DAILY EVERY MORNING 75 mL 1    insulin aspart U-100 (NOVOLOG) 100 unit/mL (3 mL) InPn pen Give the following sliding scale three times a day before meals based on pre-meal glucose check: If less than 150, give 3 unit SQ.   If 151-200, give 4 units SQ If 201-250, give 5 units SQ If 251-300, give 6 units SQ If 301-350, give 7 units SQ If 351-400, give 8 units SQ If greater than 400, call MD., 45 mL 0    lisinopril (PRINIVIL,ZESTRIL) 2.5 MG tablet Take 1 tablet (2.5 mg total) by mouth once daily. 90 tablet 3    triamcinolone acetonide 0.025% (KENALOG) 0.025  % cream Apply topically 2 (two) times daily as needed. 30 g 2    dulaglutide (TRULICITY) 1.5 mg/0.5 mL PnIj Inject 1.5 mg into the skin every 7 days. (Patient not taking: Reported on 7/21/2020) 12 Syringe 1    sodium,potassium,mag sulfates (SUPREP BOWEL PREP KIT) 17.5-3.13-1.6 gram SolR Take as instructed on prep sheet (Patient not taking: Reported on 8/17/2020) 354 mL 0    varenicline (CHANTIX STARTING MONTH BOX) 0.5 mg (11)- 1 mg (42) tablet Take one 0.5mg tab by mouth once daily X3 days,then increase to one 0.5mg tab twice daily X4 days,then increase to one 1mg tab twice daily (Patient not taking: Reported on 8/17/2020) 1 Package 0    [DISCONTINUED] fluconazole (DIFLUCAN) 150 MG Tab Take 1 tablet PO today, may repeat in 3 days (Patient not taking: Reported on 7/21/2020) 2 tablet 0     No current facility-administered medications on file prior to visit.      Social History     Socioeconomic History    Marital status:      Spouse name: Not on file    Number of children: Not on file    Years of education: Not on file    Highest education level: Not on file   Occupational History     Employer: [employer]   Social Needs    Financial resource strain: Not on file    Food insecurity     Worry: Not on file     Inability: Not on file    Transportation needs     Medical: Not on file     Non-medical: Not on file   Tobacco Use    Smoking status: Current Every Day Smoker     Packs/day: 1.50     Years: 37.00     Pack years: 55.50     Types: Cigarettes    Smokeless tobacco: Never Used    Tobacco comment: Quit smoking 10/5/17. 7/30/18 Lapsed. 8/27/18 Quit smoking.   Substance and Sexual Activity    Alcohol use: Yes    Drug use: No    Sexual activity: Not on file   Lifestyle    Physical activity     Days per week: Not on file     Minutes per session: Not on file    Stress: Not on file   Relationships    Social connections     Talks on phone: Not on file     Gets together: Not on file      "Attends Anabaptist service: Not on file     Active member of club or organization: Not on file     Attends meetings of clubs or organizations: Not on file     Relationship status: Not on file   Other Topics Concern    Not on file   Social History Narrative    Not on file     Family History   Problem Relation Age of Onset    Cancer Mother         pancreatic    Cancer Father         lung    Stroke Father     Coronary artery disease Father     Diabetes Father     Hypertension Father     Bladder Cancer Paternal Uncle     Lymphoma Paternal Uncle            Review of Systems   Constitutional: Positive for fatigue. Negative for fever and unexpected weight change.   Eyes: Negative for visual disturbance.   Respiratory: Negative for cough, chest tightness, shortness of breath and wheezing.    Cardiovascular: Negative for chest pain, palpitations and leg swelling.   Gastrointestinal: Negative for abdominal pain.   Genitourinary: Negative for dysuria and hematuria.   Musculoskeletal: Positive for back pain.   Neurological: Negative for weakness and numbness.       Objective:   /78   Pulse 74   Temp 98.6 °F (37 °C)   Ht 5' 8" (1.727 m)   Wt (!) 140.6 kg (310 lb)   BMI 47.14 kg/m²     Physical Exam  Constitutional:       Appearance: Normal appearance. She is well-developed.   HENT:      Head: Normocephalic and atraumatic.      Right Ear: Tympanic membrane, ear canal and external ear normal.      Left Ear: Tympanic membrane, ear canal and external ear normal.      Nose: Nose normal.      Mouth/Throat:      Mouth: No oral lesions.      Pharynx: Uvula midline. No oropharyngeal exudate or posterior oropharyngeal erythema.   Eyes:      General: Lids are normal. No scleral icterus.        Right eye: No discharge.         Left eye: No discharge.      Extraocular Movements:      Right eye: No nystagmus.      Left eye: No nystagmus.      Conjunctiva/sclera:      Right eye: Right conjunctiva is not injected. No " hemorrhage.     Left eye: Left conjunctiva is not injected. No hemorrhage.     Pupils: Pupils are equal, round, and reactive to light.   Neck:      Musculoskeletal: Full passive range of motion without pain, normal range of motion and neck supple.      Thyroid: No thyroid mass or thyromegaly.      Vascular: No carotid bruit or JVD.      Trachea: No tracheal tenderness or tracheal deviation.   Cardiovascular:      Rate and Rhythm: Normal rate and regular rhythm.      Pulses:           Carotid pulses are 2+ on the right side and 2+ on the left side.       Radial pulses are 2+ on the right side and 2+ on the left side.        Posterior tibial pulses are 2+ on the right side and 2+ on the left side.      Heart sounds: S1 normal and S2 normal. No murmur.   Pulmonary:      Effort: Pulmonary effort is normal. No respiratory distress.      Breath sounds: Normal breath sounds. No wheezing, rhonchi or rales.   Abdominal:      General: Bowel sounds are normal. There is no distension or abdominal bruit.      Palpations: Abdomen is soft. There is no mass or pulsatile mass.      Tenderness: There is no abdominal tenderness. There is no rebound.   Musculoskeletal:      Right knee: She exhibits no swelling. No tenderness found.      Left knee: She exhibits no swelling. No tenderness found.   Lymphadenopathy:      Head:      Right side of head: No submental or submandibular adenopathy.      Left side of head: No submental or submandibular adenopathy.      Cervical: No cervical adenopathy.   Skin:     General: Skin is warm and dry.      Findings: No rash.      Nails: There is no clubbing.     Neurological:      Mental Status: She is alert.      Cranial Nerves: No cranial nerve deficit.      Sensory: No sensory deficit.   Psychiatric:         Speech: Speech normal.         Behavior: Behavior normal. Behavior is cooperative.         Thought Content: Thought content normal.         Assessment:     1. Uncontrolled type 2 diabetes  mellitus with both eyes affected by moderate nonproliferative retinopathy without macular edema, with long-term current use of insulin    2. Type II diabetes mellitus with neurological manifestations    3. Tobacco abuse disorder    4. DM (diabetes mellitus) type II uncontrolled with eye manifestation    5. Morbid obesity    6. Encounter for screening for malignant neoplasm of respiratory organs    7. Combined hyperlipidemia associated with type 2 diabetes mellitus    8. Fatigue, unspecified type        Plan:   Chelsie was seen today for annual exam.    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  -     Comprehensive metabolic panel; Future  -     Hemoglobin A1C; Future  -     Protein / creatinine ratio, urine; Future    Type II diabetes mellitus with neurological manifestations  -     Comprehensive metabolic panel; Future    Tobacco abuse disorder  -     varenicline (CHANTIX STARTING MONTH BOX) 0.5 mg (11)- 1 mg (42) tablet; Take one 0.5mg tab by mouth once daily X3 days,then increase to one 0.5mg tab twice daily X4 days,then increase to one 1mg tab twice daily  -     varenicline (CHANTIX) 1 mg Tab; Take 1 tablet (1 mg total) by mouth 2 (two) times daily.    DM (diabetes mellitus) type II uncontrolled with eye manifestation  -     insulin (LANTUS SOLOSTAR U-100 INSULIN) glargine 100 units/mL (3mL) SubQ pen; INJECT 80 UNITS SQ DAILY EVERY MORNING  -     insulin aspart U-100 (NOVOLOG) 100 unit/mL (3 mL) InPn pen; Give the following sliding scale three times a day before meals based on pre-meal glucose check: If less than 150, give 3 unit SQ.   If 151-200, give 4 units SQ If 201-250, give 5 units SQ If 251-300, give 6 units SQ If 301-350, give 7 units SQ If  351-400, give 8 units SQ If greater than 400, call MD.,  -     lisinopriL (PRINIVIL,ZESTRIL) 2.5 MG tablet; Take 1 tablet (2.5 mg total) by mouth once daily.  -     Comprehensive metabolic panel; Future  -     Hemoglobin A1C; Future  -     Protein / creatinine ratio, urine; Future    Morbid obesity  -     Ambulatory referral/consult to Bariatric Medicine; Future    Encounter for screening for malignant neoplasm of respiratory organs  -     CT Chest Lung Screening Low Dose; Future    Combined hyperlipidemia associated with type 2 diabetes mellitus  -     atorvastatin (LIPITOR) 80 MG tablet; Take 1 tablet (80 mg total) by mouth once daily.  -     ezetimibe (ZETIA) 10 mg tablet; Take 1 tablet (10 mg total) by mouth once daily.  -     Comprehensive metabolic panel; Future  -     Lipid Panel; Future    Fatigue, unspecified type  -     CBC auto differential; Future  -     TSH; Future  -     Ambulatory referral/consult to Sleep Disorders; Future    Other orders  -     sodium,potassium,mag sulfates (SUPREP BOWEL PREP KIT) 17.5-3.13-1.6 gram SolR; Take as instructed on prep sheet

## 2020-09-03 ENCOUNTER — TELEPHONE (OUTPATIENT)
Dept: ENDOSCOPY | Facility: HOSPITAL | Age: 60
End: 2020-09-03

## 2020-09-03 ENCOUNTER — PATIENT OUTREACH (OUTPATIENT)
Dept: ADMINISTRATIVE | Facility: HOSPITAL | Age: 60
End: 2020-09-03

## 2020-09-03 NOTE — LETTER
September 3, 2020      We are seeing Chelsie Garrett, 1960, at [facility]. Yasir Emmanuel MD is their primary care physician. To help with our [place] maintenance records could you please send the following:     Eye Exam   Please fax to [facility] at [phone], attention Juan Quintero.      Thank-you in advance for your assistance. If you have any questions or concerns please contact me at [phone]  .     Juan Quintero MA  Care Coordination Department  [facility]   [phone]

## 2020-09-03 NOTE — TELEPHONE ENCOUNTER
Location Screening:    If answers yes to any of the following, schedule at [facility] ONLY. If No, OK for either location.    1. Is there a diagnosis of heart failure, severe heart valve disease (aortic stenosis) or mechanical valve? no  a. Is the Left Ventricle Ejection Fraction <30% ? no    2. Does the pt have pulmonary hypertension? no   a. Is pulmonary arterial pressure gradient >50mmHg? no   b. Is there evidence of right ventricular dysfunction? no    3. Does the pt have achalasia? no    4. Any history of negative reaction to anesthesia? no   a. Myasthenia gravis? no   b. Malignant hyperthermia? no   c. Other? no    5. Is procedure for esophageal banding? no      COVID Screening    1. Have you had a fever in the last 7 days or have you used fever reducing medicines for a fever in the last 7 days?  no    2. Are you experiencing shortness of breath, cough, muscle aches, loss of taste or loss of smell?  no    If answered yes to questions 1 and 2, the patient must seek medical attention with their PCP.  Do not schedule their procedure.     3. Are you residing with anyone who has tested positive for Covid?  no    If answered yes to question 3, recommend 14 day self-quarantine from the date of relative's positive test and place special needs note in the depot.  Wait to schedule.     ENDO screening    1. Have you been admitted for cardiac, kidney or pulmonary causes to the hospital in the past 3 months? no   If yes, schedule an appointment with PCP before scheduling endoscopic procedure.     2. Have you had a stent placed in the last 12 months? no   If yes, for a screening visit, cancel and message the ordering provider.  The patient will need a new order when the time is appropriate.     3. Have you had a stroke or heart attack in the past 6 months? no   If yes, cancel and refer patient to ordering provider for clearance, also message ordering provider to inform.     4. Have you had any chest pain in the past 3 months?  "no   If yes, Have you been evaluated by your PCP and/or cardiologist and it was determined to not be heart related? not applicable   If No, Pt needs to be seen by PCP or Cardiologist .  Pt can be scheduled once clearance obtained by either of those providers.     5. Do you take prescription weight loss medications?  no   If yes, must stop for 2 weeks prior to procedure.     6. Have you been diagnosed with diverticulitis within the past 3 months? no   If yes, must have been seen by GI within the last 3 months, if not schedule with [facility].    If pt has been seen by GI, schedule procedure 8-12 weeks post antibiotic treatment.     7. Are you on Dialysis? no  If yes, schedule procedure for the day AFTER dialysis.  Appt time should be 9am or later, patient arrival time is 2 hours prior.  Nulytely or miralax prep for all patients with kidney disease.     8. Are you diabetic?  yes   If yes, schedule morning appt. Advise pt to hold all diabetic meds day of procedure.     9. If pt is older than 80 years of age and HAS NOT been seen by GI or PCP within the last 6 months, needs appt with GI [facility].   If pt has been seen by the GI provider or PCP within the past 6  months AND meets criteria, schedule procedure AND send message to the endoscopist.     10. Is patient on a "high risk" medication (blood thinner/antiplatelet agent)?  no   If yes, has cardiac clearance been obtained within the last 60 days? N/A   If no, a new clearance needs to be obtained.     Final Questions:    1.I have reviewed the last colonoscopy for recommendations regarding next procedure bowel prep.  yes  2. I have reviewed medications and allergies.  yes  3. I have verified the pharmacy information and appropriate prep sent if needed. yes  4. Prep instructions have been mailed or sent to portal per patient request. yes        All schedulers will have ability to reach out to the ordering GI provider to clarify any issues.       "

## 2020-09-04 ENCOUNTER — TELEPHONE (OUTPATIENT)
Dept: GASTROENTEROLOGY | Facility: CLINIC | Age: 60
End: 2020-09-04

## 2020-09-05 ENCOUNTER — PATIENT MESSAGE (OUTPATIENT)
Dept: ENDOSCOPY | Facility: HOSPITAL | Age: 60
End: 2020-09-05

## 2020-09-08 ENCOUNTER — PATIENT OUTREACH (OUTPATIENT)
Dept: ADMINISTRATIVE | Facility: HOSPITAL | Age: 60
End: 2020-09-08

## 2020-09-08 PROBLEM — Z12.11 COLON CANCER SCREENING: Status: ACTIVE | Noted: 2020-09-08

## 2020-09-08 NOTE — TELEPHONE ENCOUNTER
Spoke with patient and scheduled procedure for 09/24/2020 with Dr. Emmanuel. Instructions sent via portal- Patient verbalized understanding.

## 2020-09-08 NOTE — PROGRESS NOTES
L/M for pt to schedule Labs         Karlee GRIFFIN LPN Care Coordinator  Care Coordination Department  [facility]  [phone]

## 2020-09-21 ENCOUNTER — CLINICAL SUPPORT (OUTPATIENT)
Dept: FAMILY MEDICINE | Facility: CLINIC | Age: 60
End: 2020-09-21
Payer: COMMERCIAL

## 2020-09-21 DIAGNOSIS — Z03.818 ENCOUNTER FOR OBSERVATION FOR SUSPECTED EXPOSURE TO OTHER BIOLOGICAL AGENTS RULED OUT: ICD-10-CM

## 2020-09-21 PROCEDURE — U0003 INFECTIOUS AGENT DETECTION BY NUCLEIC ACID (DNA OR RNA); SEVERE ACUTE RESPIRATORY SYNDROME CORONAVIRUS 2 (SARS-COV-2) (CORONAVIRUS DISEASE [COVID-19]), AMPLIFIED PROBE TECHNIQUE, MAKING USE OF HIGH THROUGHPUT TECHNOLOGIES AS DESCRIBED BY CMS-2020-01-R: HCPCS

## 2020-09-21 NOTE — PROGRESS NOTES
Patient presented to the office today for COVID-19 testing. Patient was swabbed and tolerated testing well. Advised to please allow 72 hours for results.

## 2020-09-22 LAB — SARS-COV-2 RNA RESP QL NAA+PROBE: NOT DETECTED

## 2020-09-23 ENCOUNTER — TELEPHONE (OUTPATIENT)
Dept: ENDOSCOPY | Facility: HOSPITAL | Age: 60
End: 2020-09-23

## 2020-09-23 NOTE — TELEPHONE ENCOUNTER
Spoke with patient in regards to cancelling colonoscopy. Patient stated that she will call office when she gets back in town to reschedule, number provided.

## 2020-10-06 ENCOUNTER — PATIENT MESSAGE (OUTPATIENT)
Dept: ADMINISTRATIVE | Facility: HOSPITAL | Age: 60
End: 2020-10-06

## 2020-10-29 ENCOUNTER — PATIENT OUTREACH (OUTPATIENT)
Dept: ADMINISTRATIVE | Facility: HOSPITAL | Age: 60
End: 2020-10-29

## 2020-10-29 NOTE — PROGRESS NOTES
Working noncompliant HGA1C report.  Noted patient saw pcp in 8/2020 but did not complete labs after appointment.  Attempted to call pt to schedule overdue DM labs.  Left voicemail.

## 2020-10-31 ENCOUNTER — PATIENT MESSAGE (OUTPATIENT)
Dept: FAMILY MEDICINE | Facility: CLINIC | Age: 60
End: 2020-10-31

## 2020-12-10 ENCOUNTER — PATIENT OUTREACH (OUTPATIENT)
Dept: ADMINISTRATIVE | Facility: HOSPITAL | Age: 60
End: 2020-12-10

## 2020-12-10 NOTE — PROGRESS NOTES
Working Diabetes Report     Pt Scheduled 12/7/2020 for Labs      Karlee GRIFFIN LPN Care Coordinator  Care Coordination Department  [facility]  [phone]

## 2020-12-11 ENCOUNTER — TELEPHONE (OUTPATIENT)
Dept: FAMILY MEDICINE | Facility: CLINIC | Age: 60
End: 2020-12-11

## 2020-12-11 ENCOUNTER — LAB VISIT (OUTPATIENT)
Dept: LAB | Facility: HOSPITAL | Age: 60
End: 2020-12-11
Attending: FAMILY MEDICINE
Payer: COMMERCIAL

## 2020-12-11 DIAGNOSIS — E11.69 COMBINED HYPERLIPIDEMIA ASSOCIATED WITH TYPE 2 DIABETES MELLITUS: ICD-10-CM

## 2020-12-11 DIAGNOSIS — R53.83 FATIGUE, UNSPECIFIED TYPE: ICD-10-CM

## 2020-12-11 DIAGNOSIS — E11.49 TYPE II DIABETES MELLITUS WITH NEUROLOGICAL MANIFESTATIONS: ICD-10-CM

## 2020-12-11 DIAGNOSIS — E78.2 COMBINED HYPERLIPIDEMIA ASSOCIATED WITH TYPE 2 DIABETES MELLITUS: ICD-10-CM

## 2020-12-11 LAB
ALBUMIN SERPL BCP-MCNC: 3.3 G/DL (ref 3.5–5.2)
ALP SERPL-CCNC: 91 U/L (ref 55–135)
ALT SERPL W/O P-5'-P-CCNC: 13 U/L (ref 10–44)
ANION GAP SERPL CALC-SCNC: 11 MMOL/L (ref 8–16)
AST SERPL-CCNC: 17 U/L (ref 10–40)
BASOPHILS # BLD AUTO: 0.05 K/UL (ref 0–0.2)
BASOPHILS NFR BLD: 0.8 % (ref 0–1.9)
BILIRUB SERPL-MCNC: 0.5 MG/DL (ref 0.1–1)
BUN SERPL-MCNC: 11 MG/DL (ref 6–20)
CALCIUM SERPL-MCNC: 8.3 MG/DL (ref 8.7–10.5)
CHLORIDE SERPL-SCNC: 104 MMOL/L (ref 95–110)
CHOLEST SERPL-MCNC: 142 MG/DL (ref 120–199)
CHOLEST/HDLC SERPL: 2.5 {RATIO} (ref 2–5)
CO2 SERPL-SCNC: 25 MMOL/L (ref 23–29)
CREAT SERPL-MCNC: 0.7 MG/DL (ref 0.5–1.4)
DIFFERENTIAL METHOD: ABNORMAL
EOSINOPHIL # BLD AUTO: 0.1 K/UL (ref 0–0.5)
EOSINOPHIL NFR BLD: 2.1 % (ref 0–8)
ERYTHROCYTE [DISTWIDTH] IN BLOOD BY AUTOMATED COUNT: 13.7 % (ref 11.5–14.5)
EST. GFR  (AFRICAN AMERICAN): >60 ML/MIN/1.73 M^2
EST. GFR  (NON AFRICAN AMERICAN): >60 ML/MIN/1.73 M^2
ESTIMATED AVG GLUCOSE: 226 MG/DL (ref 68–131)
GLUCOSE SERPL-MCNC: 99 MG/DL (ref 70–110)
HBA1C MFR BLD HPLC: 9.5 % (ref 4–5.6)
HCT VFR BLD AUTO: 47.7 % (ref 37–48.5)
HDLC SERPL-MCNC: 57 MG/DL (ref 40–75)
HDLC SERPL: 40.1 % (ref 20–50)
HGB BLD-MCNC: 14.9 G/DL (ref 12–16)
IMM GRANULOCYTES # BLD AUTO: 0.01 K/UL (ref 0–0.04)
IMM GRANULOCYTES NFR BLD AUTO: 0.2 % (ref 0–0.5)
LDLC SERPL CALC-MCNC: 73 MG/DL (ref 63–159)
LYMPHOCYTES # BLD AUTO: 2.2 K/UL (ref 1–4.8)
LYMPHOCYTES NFR BLD: 34.4 % (ref 18–48)
MCH RBC QN AUTO: 29.8 PG (ref 27–31)
MCHC RBC AUTO-ENTMCNC: 31.2 G/DL (ref 32–36)
MCV RBC AUTO: 95 FL (ref 82–98)
MONOCYTES # BLD AUTO: 0.7 K/UL (ref 0.3–1)
MONOCYTES NFR BLD: 10.7 % (ref 4–15)
NEUTROPHILS # BLD AUTO: 3.3 K/UL (ref 1.8–7.7)
NEUTROPHILS NFR BLD: 51.8 % (ref 38–73)
NONHDLC SERPL-MCNC: 85 MG/DL
NRBC BLD-RTO: 0 /100 WBC
PLATELET # BLD AUTO: 236 K/UL (ref 150–350)
PMV BLD AUTO: 10 FL (ref 9.2–12.9)
POTASSIUM SERPL-SCNC: 4.5 MMOL/L (ref 3.5–5.1)
PROT SERPL-MCNC: 6.9 G/DL (ref 6–8.4)
RBC # BLD AUTO: 5 M/UL (ref 4–5.4)
SODIUM SERPL-SCNC: 140 MMOL/L (ref 136–145)
TRIGL SERPL-MCNC: 60 MG/DL (ref 30–150)
TSH SERPL DL<=0.005 MIU/L-ACNC: 2.76 UIU/ML (ref 0.4–4)
WBC # BLD AUTO: 6.27 K/UL (ref 3.9–12.7)

## 2020-12-11 PROCEDURE — 85025 COMPLETE CBC W/AUTO DIFF WBC: CPT

## 2020-12-11 PROCEDURE — 84443 ASSAY THYROID STIM HORMONE: CPT

## 2020-12-11 PROCEDURE — 83036 HEMOGLOBIN GLYCOSYLATED A1C: CPT

## 2020-12-11 PROCEDURE — 80061 LIPID PANEL: CPT

## 2020-12-11 PROCEDURE — 80053 COMPREHEN METABOLIC PANEL: CPT

## 2020-12-11 PROCEDURE — 36415 COLL VENOUS BLD VENIPUNCTURE: CPT | Mod: PO

## 2020-12-11 NOTE — TELEPHONE ENCOUNTER
I have signed for the following orders AND/OR meds.  Please call the patient and ask the patient to schedule the testing AND/OR inform about any medications that were sent.      Orders Placed This Encounter   Procedures    Protein/Creatinine Ratio, Urine     Standing Status:   Future     Standing Expiration Date:   1/11/2021     Order Specific Question:   Specimen Source     Answer:   Urine        I have signed for the following orders AND/OR meds.  Please call the patient and ask the patient to schedule the testing AND/OR inform about any medications that were sent.      No orders of the defined types were placed in this encounter.

## 2020-12-14 NOTE — PROGRESS NOTES
Increase the Lantus to 90 units a day because the A1c is high.  Refer her to the diabetic educator to help get her sugars under better control.  Place a referral and help her make the appointment.    The electrolytes are all stable at this point and no changes are recommended based on this.  The cholesterol is at target so please continue current treatment.  The thyroid function is currently normal and there is no sign of anemia or infection on the CDC.

## 2020-12-15 ENCOUNTER — PATIENT OUTREACH (OUTPATIENT)
Dept: ADMINISTRATIVE | Facility: OTHER | Age: 60
End: 2020-12-15

## 2020-12-15 DIAGNOSIS — Z12.31 BREAST CANCER SCREENING BY MAMMOGRAM: Primary | ICD-10-CM

## 2020-12-15 RX ORDER — INSULIN GLARGINE 100 [IU]/ML
INJECTION, SOLUTION SUBCUTANEOUS
Qty: 75 ML | Refills: 1 | Status: SHIPPED | OUTPATIENT
Start: 2020-12-15 | End: 2021-03-24

## 2020-12-15 NOTE — TELEPHONE ENCOUNTER
I have signed for the following orders AND/OR meds.  Please call the patient and ask the patient to schedule the testing AND/OR inform about any medications that were sent.      Orders Placed This Encounter   Procedures    Ambulatory referral/consult to Diabetes Education     Standing Status:   Future     Standing Expiration Date:   12/15/2021     Referral Priority:   Routine     Referral Type:   Consultation     Referral Reason:   Specialty Services Required     Requested Specialty:   Diabetes     Number of Visits Requested:   1     Expiration Date:   12/15/2021       Medications Ordered This Encounter   Medications    insulin (LANTUS SOLOSTAR U-100 INSULIN) glargine 100 units/mL (3mL) SubQ pen     Sig: INJECT 90 UNITS SQ DAILY EVERY MORNING     Dispense:  75 mL     Refill:  1     Please consider 90 day supplies to promote better adherence

## 2020-12-15 NOTE — TELEPHONE ENCOUNTER
Pt was notified of results and change in insulin. She will need a refill sent to [facility]. Informed that Diabetes education will contact her to schedule appointment once referral has been signed.

## 2020-12-15 NOTE — PROGRESS NOTES
Health Maintenance Due   Topic Date Due    HIV Screening  07/10/1975    Shingles Vaccine (1 of 2) 07/10/2011    LDCT Lung Screen  07/10/2016    Colorectal Cancer Screening  02/05/2020    Influenza Vaccine (1) 08/01/2020    Foot Exam  12/12/2020    Mammogram  12/18/2020     Updates were requested from care everywhere.  Chart was reviewed for overdue Proactive [facility] Encounters ([facility]) topics (CRS, Breast Cancer Screening, Eye exam)  Health Maintenance has been updated.  LINKS immunization registry triggered.  Immunizations were reconciled.  Mammo w/David ordered

## 2020-12-21 ENCOUNTER — CLINICAL SUPPORT (OUTPATIENT)
Dept: DIABETES | Facility: CLINIC | Age: 60
End: 2020-12-21
Payer: COMMERCIAL

## 2020-12-21 PROCEDURE — G0108 DIAB MANAGE TRN  PER INDIV: HCPCS | Mod: 95,,, | Performed by: FAMILY MEDICINE

## 2020-12-21 PROCEDURE — G0108 PR DIAB MANAGE TRN  PER INDIV: ICD-10-PCS | Mod: 95,,, | Performed by: FAMILY MEDICINE

## 2020-12-22 NOTE — PROGRESS NOTES
Diabetes Care Specialist Virtual Visit Note   It was in the patient's best interest to receive diabetes self-management education and support services in this format to prevent the exposure to COVID-19.        The patient location is: home   The chief complaint leading to consultation is: Diabetes  Visit type: audiovisual  Total time spent with patient: 30 min   Each patient to whom he or she provides medical services by telemedicine is:  (1) informed of the relationship between the physician and patient and the respective role of any other health care provider with respect to management of the patient; and (2) notified that he or she may decline to receive medical services by telemedicine and may withdraw from such care at any time.      Diabetes Care Specialist Progress Note  Author: Mariaelena Patton RN  Date: 12/22/2020    Program Intake  Reason for Diabetes Program Visit:: Initial Diabetes Assessment  Current diabetes risk level:: moderate  In the last 12 months, have you:: none    Clinical      Additional Social History         Access to Mass Media & Technology  Does the patient have access to any of the following devices or technologies?: Smart phone, Home computer, Internet Access  Media or technology needs impacting ability to self-manage diabetes?: No    Cognitive/Behavioral Health  Alert and Oriented: Yes  Difficulty Thinking: No  Requires Prompting: No  Requires assistance for routine expression?: No  Cognitive or behavioral barriers impacting ability to self-manage diabetes?: No    Culture/Cheondoism  Culture or Latter day beliefs that may impact ability to access healthcare: No    Communication  Language preference: English  Hearing Problems: No  Vision Problems: No  Communication needs impacting ability to self-manage diabetes?: No           Diabetes Self-Management Skills Assessment    Diabetes Disease Process/Treatment Options  Diabetes Disease Process/Treatment Options: Skills Assessment Completed:  No  Deferred due to:: Time    Nutrition/Healthy Eating  Nutrition/Healthy Eating Skills Assessment Completed:: No  Deffered due to:: Time    Physical Activity/Exercise  Physical Activity/Exercise Skills Assessment Completed: : No  Deffered due to:: Time    Medications  Patient is able to describe current diabetes management routine.: yes  Diabetes management routine:: diet, exercise, insulin  Patient is able to identify current diabetes medications, dosages, and appropriate timing of medications.: yes  Patient understands the purpose of the medications taken for diabetes.: yes  Patient reports problems or concerns with current medication regimen.: yes  Medication regimen problems/concerns:: does not feel like regimen is working, lack of training, unsure of doses(does not know how to use novolog sliding scale)  Medication Skills Assessment Completed:: Yes  Assessment indicates:: Knowledge deficit, Instruction Needed  Area of need?: Yes    Patient does not currently take novolog on a regular basis. Does not know base dose, and gets confused with sliding scale instructions. Has been taking 6u with small meals/lower carb meals, like breakfast and 10u for higher carb meals at lunch/dinner - but not regularly. Patient states BG within reasonable range after taking these doses (usually 120-160) when checking back in October. Patient to start with checking BG before each meal, and taking novolog before each meal - and will report logs in 1 week.     Discussed potential benefit of seeing endocrinology to assist w/ insulin dosing - very high lantus doses, and very small novolog dosing, patient wants to see how first week goes before scheduling.         Home Blood Glucose Monitoring  Patient states that blood sugar is checked at home daily.: no  Reasons for not monitoring:: needs training  Home Blood Glucose Monitoring Skills Assessment Completed: : Yes  Assessment indicates:: Knowledge deficit, Instruction Needed  Area of  need?: Yes    Acute Complications  Acute Complications Skills Assessment Completed: : No  Deffered due to:: Time    Chronic Complications  Chronic Complications Skills Assessment Completed: : No  Deferred due to:: Time    Psychosocial/Coping  Psychosocial/Coping Skills Assessment Completed: : No  Deffered due to:: Time      Diabetes Self Support Plan         Assessment Summary and Plan    Based on today's diabetes care assessment, the following areas of need were identified:      Social 12/21/2020   Access to Mass Media/Tech No   Cognitive/Behavioral Health No   Culture/Hinduism No   Communication No              Diabetes Self-Management Skills 12/21/2020   Medication Yes   Home Blood Glucose Monitoring Yes          Today's interventions were provided through individual discussion, instruction, and written materials were provided.      Patient verbalized understanding of instruction and written materials.  Pt was able to return back demonstration of instructions today. Patient understood key points, needs reinforcement and further instruction.     Diabetes Self-Management Care Plan:    Today's Diabetes Self-Management Care Plan was developed with Chelsie's input. Chelsie has agreed to work toward the following goal(s) to improve his/her overall diabetes control.      Care Plan: Diabetes Management   Updates made since 12/22/2020 12:00 AM      Problem: Blood Glucose Self-Monitoring       Goal: Patient agrees to check BG 3 times per day before meals.    Start Date: 12/21/2020   Expected End Date: 12/28/2020   Priority: High      Task: Reviewed the importance of self-monitoring blood glucose and keeping logs. Completed 12/22/2020      Task: Provided patient with blood glucose logs, reviewed appropriate timing and frequency to SMBG, education on parameters on when to notify provider and advised patient to bring logs to all appts with PCP/Endocrinologist/Diabetes Care Specialist. Completed 12/22/2020      Problem: Medications        Goal: Patient will take novolog before each meal    Start Date: 12/21/2020   Expected End Date: 12/28/2020   Priority: High      Task: Reviewed with patient all diabetes medications on current medication list and provided basic review of the purpose, dosage, frequency, side effects and storage of both oral and injectable diabetes medications. Completed 12/22/2020      Task: Reviewed with patient his/her daily regimen for taking diabetes medication(s). Completed 12/22/2020      Task: Discussed guidelines for preventing, detecting and treating hypoglycemia and hyperglycemia and reviewed the importance of meal and medication timing with diabetes mediations for prevention of hypoglycemia and maximum drug benefit. Completed 12/22/2020          Follow Up Plan     Follow up in 1 week (on 12/28/2020).    Today's care plan and follow up schedule was discussed with patient.  Chelsie verbalized understanding of the care plan, goals, and agrees to follow up plan.        The patient was encouraged to communicate with his/her health care provider/physician and care team regarding his/her condition(s) and treatment.  I provided the patient with my contact information today and encouraged to contact me via phone or [facility] Patient Portal as needed.     Length of Visit   Total Time: 30 Minutes

## 2021-01-07 ENCOUNTER — PATIENT MESSAGE (OUTPATIENT)
Dept: DIABETES | Facility: CLINIC | Age: 61
End: 2021-01-07

## 2021-01-25 ENCOUNTER — PATIENT MESSAGE (OUTPATIENT)
Dept: DIABETES | Facility: CLINIC | Age: 61
End: 2021-01-25

## 2021-02-01 ENCOUNTER — PATIENT MESSAGE (OUTPATIENT)
Dept: FAMILY MEDICINE | Facility: CLINIC | Age: 61
End: 2021-02-01

## 2021-02-02 ENCOUNTER — TELEPHONE (OUTPATIENT)
Dept: ADMINISTRATIVE | Facility: HOSPITAL | Age: 61
End: 2021-02-02

## 2021-02-03 ENCOUNTER — HOSPITAL ENCOUNTER (OUTPATIENT)
Dept: RADIOLOGY | Facility: HOSPITAL | Age: 61
Discharge: HOME | End: 2021-02-03
Attending: FAMILY MEDICINE
Payer: COMMERCIAL

## 2021-02-03 VITALS — BODY MASS INDEX: 44.41 KG/M2 | WEIGHT: 293 LBS | HEIGHT: 68 IN

## 2021-02-03 DIAGNOSIS — Z12.31 BREAST CANCER SCREENING BY MAMMOGRAM: ICD-10-CM

## 2021-02-03 PROCEDURE — 77063 MAMMO DIGITAL SCREENING BILAT WITH TOMO: ICD-10-PCS | Mod: 26,,, | Performed by: RADIOLOGY

## 2021-02-03 PROCEDURE — 77067 MAMMO DIGITAL SCREENING BILAT WITH TOMO: ICD-10-PCS | Mod: 26,,, | Performed by: RADIOLOGY

## 2021-02-03 PROCEDURE — 77067 SCR MAMMO BI INCL CAD: CPT | Mod: TC,PO

## 2021-02-03 PROCEDURE — 77063 BREAST TOMOSYNTHESIS BI: CPT | Mod: 26,,, | Performed by: RADIOLOGY

## 2021-02-03 PROCEDURE — 77067 SCR MAMMO BI INCL CAD: CPT | Mod: 26,,, | Performed by: RADIOLOGY

## 2021-02-08 ENCOUNTER — PATIENT MESSAGE (OUTPATIENT)
Dept: DIABETES | Facility: CLINIC | Age: 61
End: 2021-02-08

## 2021-03-24 ENCOUNTER — PATIENT MESSAGE (OUTPATIENT)
Dept: FAMILY MEDICINE | Facility: CLINIC | Age: 61
End: 2021-03-24

## 2021-03-24 DIAGNOSIS — E11.69 COMBINED HYPERLIPIDEMIA ASSOCIATED WITH TYPE 2 DIABETES MELLITUS: ICD-10-CM

## 2021-03-24 DIAGNOSIS — E78.2 COMBINED HYPERLIPIDEMIA ASSOCIATED WITH TYPE 2 DIABETES MELLITUS: ICD-10-CM

## 2021-03-24 RX ORDER — EZETIMIBE 10 MG/1
TABLET ORAL
Qty: 90 TABLET | Refills: 3 | Status: SHIPPED | OUTPATIENT
Start: 2021-03-24 | End: 2022-02-14

## 2021-03-24 RX ORDER — DULAGLUTIDE 1.5 MG/.5ML
1.5 INJECTION, SOLUTION SUBCUTANEOUS
Qty: 6 ML | Refills: 1 | Status: SHIPPED | OUTPATIENT
Start: 2021-03-24 | End: 2023-11-01

## 2021-03-24 RX ORDER — ATORVASTATIN CALCIUM 80 MG/1
TABLET, FILM COATED ORAL
Qty: 90 TABLET | Refills: 3 | Status: SHIPPED | OUTPATIENT
Start: 2021-03-24 | End: 2022-02-14

## 2021-03-24 RX ORDER — INSULIN GLARGINE 100 [IU]/ML
INJECTION, SOLUTION SUBCUTANEOUS
Qty: 75 ML | Refills: 1 | Status: SHIPPED | OUTPATIENT
Start: 2021-03-24 | End: 2021-08-20

## 2021-03-24 RX ORDER — LISINOPRIL 2.5 MG/1
TABLET ORAL
Qty: 90 TABLET | Refills: 3 | Status: SHIPPED | OUTPATIENT
Start: 2021-03-24 | End: 2022-02-14

## 2021-05-12 DIAGNOSIS — E11.9 TYPE 2 DIABETES MELLITUS WITHOUT COMPLICATION: ICD-10-CM

## 2021-06-15 ENCOUNTER — LAB VISIT (OUTPATIENT)
Dept: LAB | Facility: HOSPITAL | Age: 61
End: 2021-06-15
Attending: FAMILY MEDICINE
Payer: COMMERCIAL

## 2021-06-15 ENCOUNTER — OFFICE VISIT (OUTPATIENT)
Dept: FAMILY MEDICINE | Facility: CLINIC | Age: 61
End: 2021-06-15
Payer: COMMERCIAL

## 2021-06-15 VITALS
HEIGHT: 68 IN | RESPIRATION RATE: 18 BRPM | WEIGHT: 293 LBS | HEART RATE: 84 BPM | OXYGEN SATURATION: 97 % | SYSTOLIC BLOOD PRESSURE: 136 MMHG | BODY MASS INDEX: 44.41 KG/M2 | TEMPERATURE: 98 F | DIASTOLIC BLOOD PRESSURE: 76 MMHG

## 2021-06-15 DIAGNOSIS — R53.83 FATIGUE, UNSPECIFIED TYPE: ICD-10-CM

## 2021-06-15 DIAGNOSIS — E78.5 HYPERLIPIDEMIA ASSOCIATED WITH TYPE 2 DIABETES MELLITUS: ICD-10-CM

## 2021-06-15 DIAGNOSIS — E66.01 MORBID OBESITY: ICD-10-CM

## 2021-06-15 DIAGNOSIS — E11.3313 MODERATE NONPROLIFERATIVE DIABETIC RETINOPATHY OF BOTH EYES WITH MACULAR EDEMA ASSOCIATED WITH TYPE 2 DIABETES MELLITUS: ICD-10-CM

## 2021-06-15 DIAGNOSIS — Z11.4 SCREENING FOR HIV (HUMAN IMMUNODEFICIENCY VIRUS): ICD-10-CM

## 2021-06-15 DIAGNOSIS — F17.210 NICOTINE DEPENDENCE, CIGARETTES, UNCOMPLICATED: ICD-10-CM

## 2021-06-15 DIAGNOSIS — R53.83 FATIGUE, UNSPECIFIED TYPE: Primary | ICD-10-CM

## 2021-06-15 DIAGNOSIS — E11.69 HYPERLIPIDEMIA ASSOCIATED WITH TYPE 2 DIABETES MELLITUS: ICD-10-CM

## 2021-06-15 DIAGNOSIS — R06.83 SNORING: ICD-10-CM

## 2021-06-15 PROBLEM — Z12.11 COLON CANCER SCREENING: Status: RESOLVED | Noted: 2020-09-08 | Resolved: 2021-06-15

## 2021-06-15 LAB
ALBUMIN SERPL BCP-MCNC: 3.4 G/DL (ref 3.5–5.2)
ALP SERPL-CCNC: 83 U/L (ref 55–135)
ALT SERPL W/O P-5'-P-CCNC: 14 U/L (ref 10–44)
ANION GAP SERPL CALC-SCNC: 12 MMOL/L (ref 8–16)
AST SERPL-CCNC: 14 U/L (ref 10–40)
BASOPHILS # BLD AUTO: 0.06 K/UL (ref 0–0.2)
BASOPHILS NFR BLD: 0.9 % (ref 0–1.9)
BILIRUB SERPL-MCNC: 0.6 MG/DL (ref 0.1–1)
BUN SERPL-MCNC: 20 MG/DL (ref 6–20)
CALCIUM SERPL-MCNC: 9.5 MG/DL (ref 8.7–10.5)
CHLORIDE SERPL-SCNC: 99 MMOL/L (ref 95–110)
CO2 SERPL-SCNC: 24 MMOL/L (ref 23–29)
CREAT SERPL-MCNC: 0.8 MG/DL (ref 0.5–1.4)
DIFFERENTIAL METHOD: ABNORMAL
EOSINOPHIL # BLD AUTO: 0.1 K/UL (ref 0–0.5)
EOSINOPHIL NFR BLD: 1.9 % (ref 0–8)
ERYTHROCYTE [DISTWIDTH] IN BLOOD BY AUTOMATED COUNT: 13.2 % (ref 11.5–14.5)
EST. GFR  (AFRICAN AMERICAN): >60 ML/MIN/1.73 M^2
EST. GFR  (NON AFRICAN AMERICAN): >60 ML/MIN/1.73 M^2
ESTIMATED AVG GLUCOSE: 240 MG/DL (ref 68–131)
GLUCOSE SERPL-MCNC: 339 MG/DL (ref 70–110)
HBA1C MFR BLD: 10 % (ref 4–5.6)
HCT VFR BLD AUTO: 49.9 % (ref 37–48.5)
HGB BLD-MCNC: 15.6 G/DL (ref 12–16)
IMM GRANULOCYTES # BLD AUTO: 0.02 K/UL (ref 0–0.04)
IMM GRANULOCYTES NFR BLD AUTO: 0.3 % (ref 0–0.5)
LYMPHOCYTES # BLD AUTO: 2.4 K/UL (ref 1–4.8)
LYMPHOCYTES NFR BLD: 34.9 % (ref 18–48)
MCH RBC QN AUTO: 29.3 PG (ref 27–31)
MCHC RBC AUTO-ENTMCNC: 31.3 G/DL (ref 32–36)
MCV RBC AUTO: 94 FL (ref 82–98)
MONOCYTES # BLD AUTO: 0.5 K/UL (ref 0.3–1)
MONOCYTES NFR BLD: 7.7 % (ref 4–15)
NEUTROPHILS # BLD AUTO: 3.7 K/UL (ref 1.8–7.7)
NEUTROPHILS NFR BLD: 54.3 % (ref 38–73)
NRBC BLD-RTO: 0 /100 WBC
PLATELET # BLD AUTO: 231 K/UL (ref 150–450)
PMV BLD AUTO: 10.6 FL (ref 9.2–12.9)
POTASSIUM SERPL-SCNC: 4.4 MMOL/L (ref 3.5–5.1)
PROT SERPL-MCNC: 7.1 G/DL (ref 6–8.4)
RBC # BLD AUTO: 5.33 M/UL (ref 4–5.4)
SODIUM SERPL-SCNC: 135 MMOL/L (ref 136–145)
TSH SERPL DL<=0.005 MIU/L-ACNC: 1.8 UIU/ML (ref 0.4–4)
WBC # BLD AUTO: 6.84 K/UL (ref 3.9–12.7)

## 2021-06-15 PROCEDURE — 81000 URINALYSIS NONAUTO W/SCOPE: CPT | Mod: PO | Performed by: FAMILY MEDICINE

## 2021-06-15 PROCEDURE — 83036 HEMOGLOBIN GLYCOSYLATED A1C: CPT | Performed by: FAMILY MEDICINE

## 2021-06-15 PROCEDURE — 99999 PR PBB SHADOW E&M-EST. PATIENT-LVL IV: ICD-10-PCS | Mod: PBBFAC,,, | Performed by: FAMILY MEDICINE

## 2021-06-15 PROCEDURE — 99214 OFFICE O/P EST MOD 30 MIN: CPT | Mod: S$GLB,,, | Performed by: FAMILY MEDICINE

## 2021-06-15 PROCEDURE — 99999 PR PBB SHADOW E&M-EST. PATIENT-LVL IV: CPT | Mod: PBBFAC,,, | Performed by: FAMILY MEDICINE

## 2021-06-15 PROCEDURE — 36415 COLL VENOUS BLD VENIPUNCTURE: CPT | Mod: PO | Performed by: FAMILY MEDICINE

## 2021-06-15 PROCEDURE — 99214 PR OFFICE/OUTPT VISIT, EST, LEVL IV, 30-39 MIN: ICD-10-PCS | Mod: S$GLB,,, | Performed by: FAMILY MEDICINE

## 2021-06-15 PROCEDURE — 3008F PR BODY MASS INDEX (BMI) DOCUMENTED: ICD-10-PCS | Mod: CPTII,S$GLB,, | Performed by: FAMILY MEDICINE

## 2021-06-15 PROCEDURE — 80053 COMPREHEN METABOLIC PANEL: CPT | Performed by: FAMILY MEDICINE

## 2021-06-15 PROCEDURE — 3008F BODY MASS INDEX DOCD: CPT | Mod: CPTII,S$GLB,, | Performed by: FAMILY MEDICINE

## 2021-06-15 PROCEDURE — 85025 COMPLETE CBC W/AUTO DIFF WBC: CPT | Performed by: FAMILY MEDICINE

## 2021-06-15 PROCEDURE — 84443 ASSAY THYROID STIM HORMONE: CPT | Performed by: FAMILY MEDICINE

## 2021-06-15 PROCEDURE — 86703 HIV-1/HIV-2 1 RESULT ANTBDY: CPT | Performed by: FAMILY MEDICINE

## 2021-06-15 PROCEDURE — 1126F PR PAIN SEVERITY QUANTIFIED, NO PAIN PRESENT: ICD-10-PCS | Mod: S$GLB,,, | Performed by: FAMILY MEDICINE

## 2021-06-15 PROCEDURE — 1126F AMNT PAIN NOTED NONE PRSNT: CPT | Mod: S$GLB,,, | Performed by: FAMILY MEDICINE

## 2021-06-15 RX ORDER — TIMOLOL MALEATE 5 MG/ML
1 SOLUTION/ DROPS OPHTHALMIC 2 TIMES DAILY
COMMUNITY
Start: 2021-04-05

## 2021-06-16 ENCOUNTER — TELEPHONE (OUTPATIENT)
Dept: DIABETES | Facility: CLINIC | Age: 61
End: 2021-06-16

## 2021-06-16 ENCOUNTER — TELEPHONE (OUTPATIENT)
Dept: FAMILY MEDICINE | Facility: CLINIC | Age: 61
End: 2021-06-16

## 2021-06-16 ENCOUNTER — TELEPHONE (OUTPATIENT)
Dept: PULMONOLOGY | Facility: HOSPITAL | Age: 61
End: 2021-06-16

## 2021-06-16 LAB
BACTERIA #/AREA URNS HPF: ABNORMAL /HPF
BILIRUB UR QL STRIP: NEGATIVE
CLARITY UR: CLEAR
COLOR UR: YELLOW
GLUCOSE UR QL STRIP: ABNORMAL
HGB UR QL STRIP: NEGATIVE
HIV 1+2 AB+HIV1 P24 AG SERPL QL IA: NEGATIVE
KETONES UR QL STRIP: NEGATIVE
LEFT EYE DM RETINOPATHY: NEGATIVE
LEUKOCYTE ESTERASE UR QL STRIP: NEGATIVE
MICROSCOPIC COMMENT: ABNORMAL
NITRITE UR QL STRIP: NEGATIVE
PH UR STRIP: 6 [PH] (ref 5–8)
PROT UR QL STRIP: NEGATIVE
RBC #/AREA URNS HPF: 0 /HPF (ref 0–4)
RIGHT EYE DM RETINOPATHY: NEGATIVE
SP GR UR STRIP: 1.02 (ref 1–1.03)
SQUAMOUS #/AREA URNS HPF: 3 /HPF
URN SPEC COLLECT METH UR: ABNORMAL
WBC #/AREA URNS HPF: 3 /HPF (ref 0–5)
YEAST URNS QL MICRO: ABNORMAL

## 2021-06-17 DIAGNOSIS — R80.9 PROTEINURIA, UNSPECIFIED TYPE: Primary | ICD-10-CM

## 2021-06-28 ENCOUNTER — PROCEDURE VISIT (OUTPATIENT)
Dept: SLEEP MEDICINE | Facility: CLINIC | Age: 61
End: 2021-06-28
Payer: COMMERCIAL

## 2021-06-28 DIAGNOSIS — R53.83 FATIGUE, UNSPECIFIED TYPE: ICD-10-CM

## 2021-06-28 DIAGNOSIS — R06.83 SNORING: ICD-10-CM

## 2021-06-28 DIAGNOSIS — E66.01 MORBID OBESITY: ICD-10-CM

## 2021-06-28 DIAGNOSIS — G47.33 OSA (OBSTRUCTIVE SLEEP APNEA): Primary | ICD-10-CM

## 2021-06-28 PROCEDURE — 95806 PR SLEEP STUDY, UNATTENDED, SIMUL RECORD HR/O2 SAT/RESP FLOW/RESP EFFT: ICD-10-PCS | Mod: 26,S$GLB,, | Performed by: INTERNAL MEDICINE

## 2021-06-28 PROCEDURE — 95806 SLEEP STUDY UNATT&RESP EFFT: CPT | Mod: 26,S$GLB,, | Performed by: INTERNAL MEDICINE

## 2021-07-01 ENCOUNTER — TELEPHONE (OUTPATIENT)
Dept: FAMILY MEDICINE | Facility: CLINIC | Age: 61
End: 2021-07-01

## 2021-07-01 DIAGNOSIS — G47.30 SLEEP APNEA, UNSPECIFIED TYPE: Primary | ICD-10-CM

## 2021-07-19 ENCOUNTER — PATIENT MESSAGE (OUTPATIENT)
Dept: FAMILY MEDICINE | Facility: CLINIC | Age: 61
End: 2021-07-19

## 2021-07-22 ENCOUNTER — TELEPHONE (OUTPATIENT)
Dept: ADMINISTRATIVE | Facility: HOSPITAL | Age: 61
End: 2021-07-22

## 2021-07-25 ENCOUNTER — NURSE TRIAGE (OUTPATIENT)
Dept: ADMINISTRATIVE | Facility: CLINIC | Age: 61
End: 2021-07-25

## 2021-08-11 ENCOUNTER — TELEPHONE (OUTPATIENT)
Dept: ADMINISTRATIVE | Facility: HOSPITAL | Age: 61
End: 2021-08-11

## 2021-09-29 ENCOUNTER — OFFICE VISIT (OUTPATIENT)
Dept: NEPHROLOGY | Facility: CLINIC | Age: 61
End: 2021-09-29
Payer: COMMERCIAL

## 2021-09-29 VITALS
SYSTOLIC BLOOD PRESSURE: 112 MMHG | HEART RATE: 81 BPM | DIASTOLIC BLOOD PRESSURE: 82 MMHG | BODY MASS INDEX: 44.41 KG/M2 | HEIGHT: 68 IN | WEIGHT: 293 LBS | OXYGEN SATURATION: 98 %

## 2021-09-29 DIAGNOSIS — F17.200 SMOKER: ICD-10-CM

## 2021-09-29 DIAGNOSIS — E11.69 HYPERLIPIDEMIA ASSOCIATED WITH TYPE 2 DIABETES MELLITUS: ICD-10-CM

## 2021-09-29 DIAGNOSIS — E66.01 MORBID OBESITY: ICD-10-CM

## 2021-09-29 DIAGNOSIS — R80.9 PROTEINURIA, UNSPECIFIED TYPE: Primary | ICD-10-CM

## 2021-09-29 DIAGNOSIS — E11.3313 MODERATE NONPROLIFERATIVE DIABETIC RETINOPATHY OF BOTH EYES WITH MACULAR EDEMA ASSOCIATED WITH TYPE 2 DIABETES MELLITUS: ICD-10-CM

## 2021-09-29 DIAGNOSIS — G62.9 PERIPHERAL POLYNEUROPATHY: ICD-10-CM

## 2021-09-29 DIAGNOSIS — E78.5 HYPERLIPIDEMIA ASSOCIATED WITH TYPE 2 DIABETES MELLITUS: ICD-10-CM

## 2021-09-29 DIAGNOSIS — K63.5 HYPERPLASTIC COLONIC POLYP, UNSPECIFIED PART OF COLON: ICD-10-CM

## 2021-09-29 DIAGNOSIS — E11.49 TYPE II DIABETES MELLITUS WITH NEUROLOGICAL MANIFESTATIONS: ICD-10-CM

## 2021-09-29 PROCEDURE — 99204 OFFICE O/P NEW MOD 45 MIN: CPT | Mod: S$GLB,,, | Performed by: INTERNAL MEDICINE

## 2021-09-29 PROCEDURE — 99204 PR OFFICE/OUTPT VISIT, NEW, LEVL IV, 45-59 MIN: ICD-10-PCS | Mod: S$GLB,,, | Performed by: INTERNAL MEDICINE

## 2021-09-29 PROCEDURE — 99999 PR PBB SHADOW E&M-EST. PATIENT-LVL III: CPT | Mod: PBBFAC,,, | Performed by: INTERNAL MEDICINE

## 2021-09-29 PROCEDURE — 99999 PR PBB SHADOW E&M-EST. PATIENT-LVL III: ICD-10-PCS | Mod: PBBFAC,,, | Performed by: INTERNAL MEDICINE

## 2021-09-29 RX ORDER — GLUCAGON 3 MG/1
POWDER NASAL
COMMUNITY
Start: 2021-08-20 | End: 2023-11-01

## 2021-10-02 PROBLEM — F17.200 SMOKER: Status: ACTIVE | Noted: 2021-10-02

## 2021-10-04 ENCOUNTER — TELEPHONE (OUTPATIENT)
Dept: FAMILY MEDICINE | Facility: CLINIC | Age: 61
End: 2021-10-04

## 2021-10-04 ENCOUNTER — PATIENT MESSAGE (OUTPATIENT)
Dept: FAMILY MEDICINE | Facility: CLINIC | Age: 61
End: 2021-10-04

## 2021-10-04 DIAGNOSIS — R19.7 DIARRHEA, UNSPECIFIED TYPE: Primary | ICD-10-CM

## 2021-10-04 RX ORDER — DIPHENOXYLATE HYDROCHLORIDE AND ATROPINE SULFATE 2.5; .025 MG/1; MG/1
1 TABLET ORAL 4 TIMES DAILY PRN
Qty: 20 TABLET | Refills: 0 | Status: SHIPPED | OUTPATIENT
Start: 2021-10-04 | End: 2021-10-14

## 2021-10-05 ENCOUNTER — PATIENT MESSAGE (OUTPATIENT)
Dept: NEPHROLOGY | Facility: CLINIC | Age: 61
End: 2021-10-05

## 2021-10-29 ENCOUNTER — PATIENT OUTREACH (OUTPATIENT)
Dept: ADMINISTRATIVE | Facility: HOSPITAL | Age: 61
End: 2021-10-29
Payer: COMMERCIAL

## 2021-11-11 ENCOUNTER — TELEPHONE (OUTPATIENT)
Dept: FAMILY MEDICINE | Facility: CLINIC | Age: 61
End: 2021-11-11
Payer: COMMERCIAL

## 2022-01-16 ENCOUNTER — PATIENT MESSAGE (OUTPATIENT)
Dept: FAMILY MEDICINE | Facility: CLINIC | Age: 62
End: 2022-01-16
Payer: COMMERCIAL

## 2022-02-16 DIAGNOSIS — Z12.31 OTHER SCREENING MAMMOGRAM: ICD-10-CM

## 2022-03-07 ENCOUNTER — HOSPITAL ENCOUNTER (OUTPATIENT)
Dept: RADIOLOGY | Facility: HOSPITAL | Age: 62
Discharge: HOME | End: 2022-03-07
Attending: FAMILY MEDICINE
Payer: COMMERCIAL

## 2022-03-07 VITALS — BODY MASS INDEX: 44.41 KG/M2 | HEIGHT: 68 IN | WEIGHT: 293 LBS

## 2022-03-07 DIAGNOSIS — Z12.31 OTHER SCREENING MAMMOGRAM: ICD-10-CM

## 2022-03-07 PROCEDURE — 77063 BREAST TOMOSYNTHESIS BI: CPT | Mod: 26,,, | Performed by: RADIOLOGY

## 2022-03-07 PROCEDURE — 77067 SCR MAMMO BI INCL CAD: CPT | Mod: 26,,, | Performed by: RADIOLOGY

## 2022-03-07 PROCEDURE — 77063 BREAST TOMOSYNTHESIS BI: CPT | Mod: TC,PO

## 2022-03-07 PROCEDURE — 77063 MAMMO DIGITAL SCREENING BILAT WITH TOMO: ICD-10-PCS | Mod: 26,,, | Performed by: RADIOLOGY

## 2022-03-07 PROCEDURE — 77067 MAMMO DIGITAL SCREENING BILAT WITH TOMO: ICD-10-PCS | Mod: 26,,, | Performed by: RADIOLOGY

## 2022-04-27 ENCOUNTER — PATIENT MESSAGE (OUTPATIENT)
Dept: ADMINISTRATIVE | Facility: HOSPITAL | Age: 62
End: 2022-04-27
Payer: COMMERCIAL

## 2022-05-26 ENCOUNTER — PATIENT MESSAGE (OUTPATIENT)
Dept: FAMILY MEDICINE | Facility: CLINIC | Age: 62
End: 2022-05-26
Payer: COMMERCIAL

## 2022-05-30 ENCOUNTER — PATIENT OUTREACH (OUTPATIENT)
Dept: ADMINISTRATIVE | Facility: HOSPITAL | Age: 62
End: 2022-05-30
Payer: COMMERCIAL

## 2022-05-30 NOTE — PROGRESS NOTES
HA1c Report: DM labs ordered & linked to upcoming Lab appt. Called patient to inform to FAST, no answer, left detailed VM.

## 2022-06-01 ENCOUNTER — LAB VISIT (OUTPATIENT)
Dept: LAB | Facility: HOSPITAL | Age: 62
End: 2022-06-01
Attending: INTERNAL MEDICINE
Payer: COMMERCIAL

## 2022-06-01 DIAGNOSIS — R80.9 PROTEINURIA, UNSPECIFIED TYPE: ICD-10-CM

## 2022-06-01 LAB
ALBUMIN SERPL BCP-MCNC: 3.3 G/DL (ref 3.5–5.2)
ALBUMIN SERPL BCP-MCNC: 3.3 G/DL (ref 3.5–5.2)
ALP SERPL-CCNC: 81 U/L (ref 55–135)
ALT SERPL W/O P-5'-P-CCNC: 18 U/L (ref 10–44)
ANION GAP SERPL CALC-SCNC: 6 MMOL/L (ref 8–16)
ANION GAP SERPL CALC-SCNC: 6 MMOL/L (ref 8–16)
AST SERPL-CCNC: 17 U/L (ref 10–40)
BASOPHILS # BLD AUTO: 0.06 K/UL (ref 0–0.2)
BASOPHILS NFR BLD: 0.8 % (ref 0–1.9)
BILIRUB SERPL-MCNC: 0.4 MG/DL (ref 0.1–1)
BUN SERPL-MCNC: 10 MG/DL (ref 6–20)
BUN SERPL-MCNC: 10 MG/DL (ref 6–20)
CALCIUM SERPL-MCNC: 8.9 MG/DL (ref 8.7–10.5)
CALCIUM SERPL-MCNC: 8.9 MG/DL (ref 8.7–10.5)
CHLORIDE SERPL-SCNC: 103 MMOL/L (ref 95–110)
CHLORIDE SERPL-SCNC: 103 MMOL/L (ref 95–110)
CHOLEST SERPL-MCNC: 132 MG/DL (ref 120–199)
CHOLEST/HDLC SERPL: 2.6 {RATIO} (ref 2–5)
CO2 SERPL-SCNC: 29 MMOL/L (ref 23–29)
CO2 SERPL-SCNC: 29 MMOL/L (ref 23–29)
CREAT SERPL-MCNC: 0.7 MG/DL (ref 0.5–1.4)
CREAT SERPL-MCNC: 0.7 MG/DL (ref 0.5–1.4)
DIFFERENTIAL METHOD: ABNORMAL
EOSINOPHIL # BLD AUTO: 0.2 K/UL (ref 0–0.5)
EOSINOPHIL NFR BLD: 2.3 % (ref 0–8)
ERYTHROCYTE [DISTWIDTH] IN BLOOD BY AUTOMATED COUNT: 12.8 % (ref 11.5–14.5)
EST. GFR  (AFRICAN AMERICAN): >60 ML/MIN/1.73 M^2
EST. GFR  (AFRICAN AMERICAN): >60 ML/MIN/1.73 M^2
EST. GFR  (NON AFRICAN AMERICAN): >60 ML/MIN/1.73 M^2
EST. GFR  (NON AFRICAN AMERICAN): >60 ML/MIN/1.73 M^2
ESTIMATED AVG GLUCOSE: 240 MG/DL (ref 68–131)
GLUCOSE SERPL-MCNC: 111 MG/DL (ref 70–110)
GLUCOSE SERPL-MCNC: 111 MG/DL (ref 70–110)
HBA1C MFR BLD: 10 % (ref 4–5.6)
HCT VFR BLD AUTO: 49.2 % (ref 37–48.5)
HDLC SERPL-MCNC: 51 MG/DL (ref 40–75)
HDLC SERPL: 38.6 % (ref 20–50)
HGB BLD-MCNC: 15.7 G/DL (ref 12–16)
IMM GRANULOCYTES # BLD AUTO: 0.02 K/UL (ref 0–0.04)
IMM GRANULOCYTES NFR BLD AUTO: 0.3 % (ref 0–0.5)
LDLC SERPL CALC-MCNC: 60.4 MG/DL (ref 63–159)
LYMPHOCYTES # BLD AUTO: 2.3 K/UL (ref 1–4.8)
LYMPHOCYTES NFR BLD: 31.2 % (ref 18–48)
MCH RBC QN AUTO: 31.7 PG (ref 27–31)
MCHC RBC AUTO-ENTMCNC: 31.9 G/DL (ref 32–36)
MCV RBC AUTO: 99 FL (ref 82–98)
MONOCYTES # BLD AUTO: 0.7 K/UL (ref 0.3–1)
MONOCYTES NFR BLD: 8.9 % (ref 4–15)
NEUTROPHILS # BLD AUTO: 4.1 K/UL (ref 1.8–7.7)
NEUTROPHILS NFR BLD: 56.5 % (ref 38–73)
NONHDLC SERPL-MCNC: 81 MG/DL
NRBC BLD-RTO: 0 /100 WBC
PHOSPHATE SERPL-MCNC: 3.5 MG/DL (ref 2.7–4.5)
PLATELET # BLD AUTO: 248 K/UL (ref 150–450)
PMV BLD AUTO: 10.2 FL (ref 9.2–12.9)
POTASSIUM SERPL-SCNC: 5.2 MMOL/L (ref 3.5–5.1)
POTASSIUM SERPL-SCNC: 5.2 MMOL/L (ref 3.5–5.1)
PROT SERPL-MCNC: 6.5 G/DL (ref 6–8.4)
RBC # BLD AUTO: 4.95 M/UL (ref 4–5.4)
SODIUM SERPL-SCNC: 138 MMOL/L (ref 136–145)
SODIUM SERPL-SCNC: 138 MMOL/L (ref 136–145)
TRIGL SERPL-MCNC: 103 MG/DL (ref 30–150)
WBC # BLD AUTO: 7.33 K/UL (ref 3.9–12.7)

## 2022-06-01 PROCEDURE — 80053 COMPREHEN METABOLIC PANEL: CPT | Performed by: FAMILY MEDICINE

## 2022-06-01 PROCEDURE — 80061 LIPID PANEL: CPT | Performed by: FAMILY MEDICINE

## 2022-06-01 PROCEDURE — 85025 COMPLETE CBC W/AUTO DIFF WBC: CPT | Performed by: FAMILY MEDICINE

## 2022-06-01 PROCEDURE — 36415 COLL VENOUS BLD VENIPUNCTURE: CPT | Mod: PO | Performed by: INTERNAL MEDICINE

## 2022-06-01 PROCEDURE — 83036 HEMOGLOBIN GLYCOSYLATED A1C: CPT | Performed by: FAMILY MEDICINE

## 2022-06-01 PROCEDURE — 80069 RENAL FUNCTION PANEL: CPT | Performed by: INTERNAL MEDICINE

## 2022-06-02 DIAGNOSIS — R19.7 DIARRHEA, UNSPECIFIED TYPE: Primary | ICD-10-CM

## 2022-06-02 NOTE — PROGRESS NOTES
The a1c is out of control.  Please schedule a virtual or in person visit to review meds and make a plan to move forward.  If you can get a diabetic educator to see her soon, I would defer to that to use their expertise to help get this to goal.  This is just not getting better doing what we are doing and it has been a long time since the levels were checked.  I do seen an existing order for this back in 6/2021    The CBC is stable.   The lipid is at goal.   The cMP is stable.

## 2022-06-03 ENCOUNTER — TELEPHONE (OUTPATIENT)
Dept: DIABETES | Facility: CLINIC | Age: 62
End: 2022-06-03
Payer: COMMERCIAL

## 2022-06-28 ENCOUNTER — LAB VISIT (OUTPATIENT)
Dept: LAB | Facility: HOSPITAL | Age: 62
End: 2022-06-28
Attending: INTERNAL MEDICINE
Payer: COMMERCIAL

## 2022-06-28 ENCOUNTER — OFFICE VISIT (OUTPATIENT)
Dept: NEPHROLOGY | Facility: CLINIC | Age: 62
End: 2022-06-28
Payer: COMMERCIAL

## 2022-06-28 VITALS
HEIGHT: 68 IN | SYSTOLIC BLOOD PRESSURE: 122 MMHG | DIASTOLIC BLOOD PRESSURE: 80 MMHG | WEIGHT: 293 LBS | HEART RATE: 74 BPM | BODY MASS INDEX: 44.41 KG/M2 | OXYGEN SATURATION: 99 %

## 2022-06-28 DIAGNOSIS — E11.3313 MODERATE NONPROLIFERATIVE DIABETIC RETINOPATHY OF BOTH EYES WITH MACULAR EDEMA ASSOCIATED WITH TYPE 2 DIABETES MELLITUS: ICD-10-CM

## 2022-06-28 DIAGNOSIS — E78.5 HYPERLIPIDEMIA ASSOCIATED WITH TYPE 2 DIABETES MELLITUS: ICD-10-CM

## 2022-06-28 DIAGNOSIS — F17.200 SMOKER: ICD-10-CM

## 2022-06-28 DIAGNOSIS — E11.69 HYPERLIPIDEMIA ASSOCIATED WITH TYPE 2 DIABETES MELLITUS: ICD-10-CM

## 2022-06-28 DIAGNOSIS — R80.9 PROTEINURIA, UNSPECIFIED TYPE: ICD-10-CM

## 2022-06-28 DIAGNOSIS — E11.49 TYPE II DIABETES MELLITUS WITH NEUROLOGICAL MANIFESTATIONS: ICD-10-CM

## 2022-06-28 DIAGNOSIS — E66.01 MORBID OBESITY: ICD-10-CM

## 2022-06-28 DIAGNOSIS — R80.9 PROTEINURIA, UNSPECIFIED TYPE: Primary | ICD-10-CM

## 2022-06-28 DIAGNOSIS — R80.1 PERSISTENT PROTEINURIA: ICD-10-CM

## 2022-06-28 LAB
ALBUMIN/CREAT UR: 72.7 UG/MG (ref 0–30)
CREAT UR-MCNC: 55 MG/DL (ref 15–325)
CREAT UR-MCNC: 58 MG/DL (ref 15–325)
MICROALBUMIN UR DL<=1MG/L-MCNC: 40 UG/ML
PROT UR-MCNC: 10 MG/DL (ref 0–15)
PROT/CREAT UR: 0.17 MG/G{CREAT} (ref 0–0.2)

## 2022-06-28 PROCEDURE — 3079F PR MOST RECENT DIASTOLIC BLOOD PRESSURE 80-89 MM HG: ICD-10-PCS | Mod: CPTII,S$GLB,, | Performed by: INTERNAL MEDICINE

## 2022-06-28 PROCEDURE — 3046F HEMOGLOBIN A1C LEVEL >9.0%: CPT | Mod: CPTII,S$GLB,, | Performed by: INTERNAL MEDICINE

## 2022-06-28 PROCEDURE — 99215 PR OFFICE/OUTPT VISIT, EST, LEVL V, 40-54 MIN: ICD-10-PCS | Mod: S$GLB,,, | Performed by: INTERNAL MEDICINE

## 2022-06-28 PROCEDURE — 3008F PR BODY MASS INDEX (BMI) DOCUMENTED: ICD-10-PCS | Mod: CPTII,S$GLB,, | Performed by: INTERNAL MEDICINE

## 2022-06-28 PROCEDURE — 1159F PR MEDICATION LIST DOCUMENTED IN MEDICAL RECORD: ICD-10-PCS | Mod: CPTII,S$GLB,, | Performed by: INTERNAL MEDICINE

## 2022-06-28 PROCEDURE — 82570 ASSAY OF URINE CREATININE: CPT | Performed by: INTERNAL MEDICINE

## 2022-06-28 PROCEDURE — 4010F ACE/ARB THERAPY RXD/TAKEN: CPT | Mod: CPTII,S$GLB,, | Performed by: INTERNAL MEDICINE

## 2022-06-28 PROCEDURE — 4010F PR ACE/ARB THEARPY RXD/TAKEN: ICD-10-PCS | Mod: CPTII,S$GLB,, | Performed by: INTERNAL MEDICINE

## 2022-06-28 PROCEDURE — 99999 PR PBB SHADOW E&M-EST. PATIENT-LVL III: CPT | Mod: PBBFAC,,, | Performed by: INTERNAL MEDICINE

## 2022-06-28 PROCEDURE — 3008F BODY MASS INDEX DOCD: CPT | Mod: CPTII,S$GLB,, | Performed by: INTERNAL MEDICINE

## 2022-06-28 PROCEDURE — 99215 OFFICE O/P EST HI 40 MIN: CPT | Mod: S$GLB,,, | Performed by: INTERNAL MEDICINE

## 2022-06-28 PROCEDURE — 82043 UR ALBUMIN QUANTITATIVE: CPT | Performed by: INTERNAL MEDICINE

## 2022-06-28 PROCEDURE — 1159F MED LIST DOCD IN RCRD: CPT | Mod: CPTII,S$GLB,, | Performed by: INTERNAL MEDICINE

## 2022-06-28 PROCEDURE — 99999 PR PBB SHADOW E&M-EST. PATIENT-LVL III: ICD-10-PCS | Mod: PBBFAC,,, | Performed by: INTERNAL MEDICINE

## 2022-06-28 PROCEDURE — 3046F PR MOST RECENT HEMOGLOBIN A1C LEVEL > 9.0%: ICD-10-PCS | Mod: CPTII,S$GLB,, | Performed by: INTERNAL MEDICINE

## 2022-06-28 PROCEDURE — 3060F PR POS MICROALBUMINURIA RESULT DOCUMENTED/REVIEW: ICD-10-PCS | Mod: CPTII,S$GLB,, | Performed by: INTERNAL MEDICINE

## 2022-06-28 PROCEDURE — 3079F DIAST BP 80-89 MM HG: CPT | Mod: CPTII,S$GLB,, | Performed by: INTERNAL MEDICINE

## 2022-06-28 PROCEDURE — 3074F SYST BP LT 130 MM HG: CPT | Mod: CPTII,S$GLB,, | Performed by: INTERNAL MEDICINE

## 2022-06-28 PROCEDURE — 84156 ASSAY OF PROTEIN URINE: CPT | Performed by: INTERNAL MEDICINE

## 2022-06-28 PROCEDURE — 3066F NEPHROPATHY DOC TX: CPT | Mod: CPTII,S$GLB,, | Performed by: INTERNAL MEDICINE

## 2022-06-28 PROCEDURE — 3060F POS MICROALBUMINURIA REV: CPT | Mod: CPTII,S$GLB,, | Performed by: INTERNAL MEDICINE

## 2022-06-28 PROCEDURE — 3074F PR MOST RECENT SYSTOLIC BLOOD PRESSURE < 130 MM HG: ICD-10-PCS | Mod: CPTII,S$GLB,, | Performed by: INTERNAL MEDICINE

## 2022-06-28 PROCEDURE — 3066F PR DOCUMENTATION OF TREATMENT FOR NEPHROPATHY: ICD-10-PCS | Mod: CPTII,S$GLB,, | Performed by: INTERNAL MEDICINE

## 2022-06-28 RX ORDER — BROMOCRIPTINE MESYLATE 0.8 MG/1
0.8 TABLET ORAL NIGHTLY
COMMUNITY
End: 2023-11-01 | Stop reason: SDUPTHER

## 2022-06-28 NOTE — PROGRESS NOTES
Subjective:       Patient ID: Chelsie Garrett is a 61 y.o. White female who presents for follow up evaluation of Proteinuria    HPI     She is referred by her PCP for proteinuria in the setting of DM 2 and obesity   Her DM is not very well controlled, she is aware.   Microalbuminuria is measured at 42-60mcg in the past 6 years     June 2022 Interval History: She is doing fine.     Review of Systems   Constitutional: Negative for activity change.   HENT: Negative for facial swelling.    Respiratory: Negative for shortness of breath.    Cardiovascular: Negative for leg swelling.   Allergic/Immunologic: Positive for immunocompromised state.   Psychiatric/Behavioral: Negative for confusion and decreased concentration.       Objective:      Physical Exam  Vitals and nursing note reviewed.   Constitutional:       General: She is not in acute distress.     Appearance: She is obese. She is not ill-appearing.   HENT:      Head: Atraumatic.   Eyes:      General: No scleral icterus.  Cardiovascular:      Rate and Rhythm: Tachycardia present.      Heart sounds:     No friction rub.   Pulmonary:      Effort: Pulmonary effort is normal. No respiratory distress.   Abdominal:      General: There is no distension.   Musculoskeletal:      Right lower leg: No edema.      Left lower leg: No edema.   Skin:     Coloration: Skin is not jaundiced.   Neurological:      Mental Status: She is alert and oriented to person, place, and time.   Psychiatric:         Mood and Affect: Mood normal.         Behavior: Behavior normal.         Thought Content: Thought content normal.         Judgment: Judgment normal.         Assessment:       1. Proteinuria, unspecified type    2. Persistent proteinuria    3. Smoker    4. Morbid obesity    5. DM (diabetes mellitus) type II uncontrolled with eye manifestation    6. Type II diabetes mellitus with neurological manifestations    7. Hyperlipidemia associated with type 2 diabetes mellitus    8. Moderate  nonproliferative diabetic retinopathy of both eyes with macular edema associated with type 2 diabetes mellitus        Plan:           Proteinuria  - From DM nephropathy, extra weight is likely contributing as well   - she is already maintained on an ace-inhibitor but can add Aldactone with close monitoring of potassium if proteinuria significantly worsens   - recheck upc in 6 mo     RTC 6mo    51 minutes of total time spent on the encounter, which includes face to face time and non-face to face time preparing to see the patient (eg, review of tests), Obtaining and/or reviewing separately obtained history, Documenting clinical information in the electronic or other health record, Independently interpreting results (not separately reported) and communicating results to the patient/family/caregiver, or Care coordination (not separately reported).

## 2022-06-29 ENCOUNTER — TELEPHONE (OUTPATIENT)
Dept: NEPHROLOGY | Facility: CLINIC | Age: 62
End: 2022-06-29
Payer: COMMERCIAL

## 2022-06-30 NOTE — TELEPHONE ENCOUNTER
Patient informed of lab results and verbal read back was given. She is taking the Lisinopril every day as prescribed.

## 2022-06-30 NOTE — TELEPHONE ENCOUNTER
Results of urine protein:    Her urine albumin (type of protein) remains slightly elevated and the trend is increasing levels, indicating kidney damage,    As we discussed in clinic on Tuesday she needs to take her lisinopril every day.    Thanks you

## 2022-07-08 ENCOUNTER — PATIENT MESSAGE (OUTPATIENT)
Dept: FAMILY MEDICINE | Facility: CLINIC | Age: 62
End: 2022-07-08
Payer: COMMERCIAL

## 2022-07-09 PROBLEM — R80.9 PROTEINURIA: Status: ACTIVE | Noted: 2022-07-09

## 2022-07-12 ENCOUNTER — PATIENT MESSAGE (OUTPATIENT)
Dept: NEPHROLOGY | Facility: CLINIC | Age: 62
End: 2022-07-12
Payer: COMMERCIAL

## 2022-07-20 ENCOUNTER — CLINICAL SUPPORT (OUTPATIENT)
Dept: FAMILY MEDICINE | Facility: CLINIC | Age: 62
End: 2022-07-20
Payer: COMMERCIAL

## 2022-07-20 ENCOUNTER — TELEPHONE (OUTPATIENT)
Dept: FAMILY MEDICINE | Facility: CLINIC | Age: 62
End: 2022-07-20
Payer: COMMERCIAL

## 2022-07-20 DIAGNOSIS — Z11.52 ENCOUNTER FOR SCREENING FOR COVID-19: ICD-10-CM

## 2022-07-20 DIAGNOSIS — R05.9 COUGH: Primary | ICD-10-CM

## 2022-07-20 DIAGNOSIS — R05.9 COUGH: ICD-10-CM

## 2022-07-20 LAB
CTP QC/QA: YES
SARS-COV-2 RDRP RESP QL NAA+PROBE: NEGATIVE

## 2022-07-20 PROCEDURE — U0002: ICD-10-PCS | Mod: QW,S$GLB,, | Performed by: FAMILY MEDICINE

## 2022-07-20 PROCEDURE — 99999 PR PBB SHADOW E&M-EST. PATIENT-LVL I: CPT | Mod: PBBFAC,,,

## 2022-07-20 PROCEDURE — 99999 PR PBB SHADOW E&M-EST. PATIENT-LVL I: ICD-10-PCS | Mod: PBBFAC,,,

## 2022-07-20 PROCEDURE — U0002 COVID-19 LAB TEST NON-CDC: HCPCS | Mod: QW,S$GLB,, | Performed by: FAMILY MEDICINE

## 2022-07-20 NOTE — TELEPHONE ENCOUNTER
----- Message from Estefania Fritz sent at 7/20/2022 11:17 AM CDT -----   is on his way for his covid test.  Pt having the same symptoms as  can you please order a covid test for her.  Please advise

## 2022-07-20 NOTE — PROGRESS NOTES
Pt presents today for Covid screening with symptoms  2 patient identifiers  Pt tolerated swab

## 2022-07-20 NOTE — PROGRESS NOTES
Chelsie, you are negative for COVID.  However, you are at high risk of getting it due to your exposure.  This test may be a false positive.  If you are having symptoms or get symptoms, you should consider retesting.    You are at moderate risk of having complications of covid should you contract it and I would recommend treatment with paxlovid if you eventually test positive.     COVID Risk of Complication Score  Your Covid Risk Score Is: 3    1-2:Low Risk  3-5:Medium Risk  6 or greater:High Risk

## 2022-07-27 ENCOUNTER — PATIENT MESSAGE (OUTPATIENT)
Dept: ADMINISTRATIVE | Facility: HOSPITAL | Age: 62
End: 2022-07-27
Payer: COMMERCIAL

## 2022-08-08 ENCOUNTER — PATIENT MESSAGE (OUTPATIENT)
Dept: FAMILY MEDICINE | Facility: CLINIC | Age: 62
End: 2022-08-08
Payer: COMMERCIAL

## 2022-08-08 ENCOUNTER — PATIENT MESSAGE (OUTPATIENT)
Dept: ADMINISTRATIVE | Facility: HOSPITAL | Age: 62
End: 2022-08-08
Payer: COMMERCIAL

## 2022-08-08 ENCOUNTER — PATIENT MESSAGE (OUTPATIENT)
Dept: NEPHROLOGY | Facility: CLINIC | Age: 62
End: 2022-08-08
Payer: COMMERCIAL

## 2022-08-08 DIAGNOSIS — R31.9 HEMATURIA, UNSPECIFIED TYPE: Primary | ICD-10-CM

## 2022-08-08 DIAGNOSIS — N22 CALCULUS OF URINARY TRACT IN DISEASES CLASSIFIED ELSEWHERE: ICD-10-CM

## 2022-08-08 NOTE — TELEPHONE ENCOUNTER
I have signed for the following orders AND/OR meds.  Please call the patient and ask the patient to schedule the testing AND/OR inform about any medications that were sent.      Orders Placed This Encounter   Procedures    CT Renal Stone Study ABD Pelvis WO     Standing Status:   Future     Standing Expiration Date:   8/8/2023     Order Specific Question:   May the Radiologist modify the order per protocol to meet the clinical needs of the patient?     Answer:   Yes

## 2022-08-23 ENCOUNTER — TELEPHONE (OUTPATIENT)
Dept: FAMILY MEDICINE | Facility: CLINIC | Age: 62
End: 2022-08-23
Payer: COMMERCIAL

## 2022-08-23 NOTE — TELEPHONE ENCOUNTER
Please obtain authorization for CT scan at [facility]. Referral is updated with this information.

## 2022-08-23 NOTE — TELEPHONE ENCOUNTER
----- Message from Patricia Villanueva sent at 8/23/2022  8:59 AM CDT -----  Contact: [facility]  States a authorization is needed on the pt CT, her appt is 08/29, no additional info given and can be reached at [phone] ///[email or web address]

## 2022-08-23 NOTE — TELEPHONE ENCOUNTER
Left message for marleny with the following information from our pre-services department.     approved auth#[id number] valid 8/23-9/21/22

## 2022-08-31 ENCOUNTER — PATIENT MESSAGE (OUTPATIENT)
Dept: FAMILY MEDICINE | Facility: CLINIC | Age: 62
End: 2022-08-31
Payer: COMMERCIAL

## 2022-10-07 ENCOUNTER — PATIENT MESSAGE (OUTPATIENT)
Dept: FAMILY MEDICINE | Facility: CLINIC | Age: 62
End: 2022-10-07

## 2022-10-07 ENCOUNTER — E-VISIT (OUTPATIENT)
Dept: FAMILY MEDICINE | Facility: CLINIC | Age: 62
End: 2022-10-07
Payer: COMMERCIAL

## 2022-10-07 DIAGNOSIS — R05.1 ACUTE COUGH: Primary | ICD-10-CM

## 2022-10-07 PROCEDURE — 99421 PR E&M, ONLINE DIGIT, EST, < 7 DAYS, 5-10 MINS: ICD-10-PCS | Mod: S$GLB,,, | Performed by: FAMILY MEDICINE

## 2022-10-07 PROCEDURE — 99421 OL DIG E/M SVC 5-10 MIN: CPT | Mod: S$GLB,,, | Performed by: FAMILY MEDICINE

## 2022-10-07 RX ORDER — AZELASTINE 1 MG/ML
2 SPRAY, METERED NASAL 2 TIMES DAILY
Qty: 30 ML | Refills: 11 | Status: SHIPPED | OUTPATIENT
Start: 2022-10-07 | End: 2024-02-07 | Stop reason: SDUPTHER

## 2022-10-07 RX ORDER — BENZONATATE 200 MG/1
200 CAPSULE ORAL 3 TIMES DAILY PRN
Qty: 30 CAPSULE | Refills: 0 | Status: SHIPPED | OUTPATIENT
Start: 2022-10-07 | End: 2023-11-01

## 2022-10-09 NOTE — PROGRESS NOTES
Patient ID: Chelsie Garrett is a 62 y.o. female.    Chief Complaint: Cough    The patient initiated a request through [employer] on 10/7/2022 for evaluation and management with a chief complaint of Cough     I evaluated the questionnaire submission on 10/09/2022  .    Ohs Peq Evisit Upper Respitatory/Cough Questionnaire    10/7/2022  4:22 PM CDT - Filed by Patient   Do you agree to participate in an E-Visit? Yes   If you have any of the following symptoms, please present to your local ER or call 911:  I acknowledge   What is the main issue that you would like for your doctor to address today? Terrible sinus drip and upper resp. cough   Are you able to take your vital signs? Yes   Systolic Blood Pressure: 127   Diastolic Blood Pressure: 76   Weight: 292   Height: 68   Pulse: 78   Temp: 98.5   Resp: 16   Pulse Ox: 97   What symptoms do you currently have?  Cough;  Nasal Congestion;  Runny nose   Have you had a fever? No   When did your symptoms first appear? 10/5/2022   In the last two weeks, have you been in close contact with someone who has COVID-19? No   In the last two weeks, have you worked or volunteered in a healthcare facility or as a ? Healthcare facilities include a hospital, medical or dental clinic, long-term care facility, or nursing home No   Do you live in a long-term care facility, nursing home, or homeless shelter? No   List what you have done or taken to help your symptoms. hot showers, air purifiers   How severe are your symptoms? Moderate   Have you taken an at home Covid test? Yes   What were the results? Negative   Have you been fully vaccinated for COVID? (2 Pfizer, 2 Moderna or 1 Sudhakar & Sudhakar vaccine injections) Yes   Have you received a booster? Yes   Do you have transportation to get tested for COVID if it is indicated and ordered for you at an [facility] location? Yes   Provide any information you feel is important to your history not asked above    Please attach any relevant  images or files           Active Problem List with Overview Notes    Diagnosis Date Noted    Proteinuria 07/09/2022    Smoker 10/02/2021    Type II diabetes mellitus with neurological manifestations 02/19/2019    Pain in right foot - Right Foot 02/19/2019    Contusion of right heel 02/19/2019    Plantar fasciitis 08/06/2018    Actinic keratosis 08/06/2018    Cigarette nicotine dependence without complication 09/19/2017    Left wrist tendonitis 03/01/2017    Trigger thumb of left hand 03/01/2017    Left hand pain 03/01/2017    Thumb joint stiffness 03/01/2017    Glaucoma 02/04/2017     RIGHT EYE      Trigger finger of left thumb 02/04/2017    Moderate nonproliferative diabetic retinopathy with macular edema associated with type 2 diabetes mellitus 04/13/2016     BOTH EYES      Insulin long-term use 08/28/2015    Leg weakness 05/08/2015    Hyperplastic colon polyp 02/05/2015    Hyperlipidemia associated with type 2 diabetes mellitus 10/08/2014     The patient presents with hyperlipidemia.  The patient reports tolerating the medication well and is in excellent compliance.  There have been no medication side effects.  The patient denies chest pain, neuropathy, and myalgias.  The patient has reduced fat intake and has been exercising.  Current treatment has included the medications listed in the med card.  I just added the zetia to the lipitor 80 mg a day to get the LDL to goal and will check that again soon.  Lab Results   Component Value Date    CHOL 232 (H) 08/12/2019    CHOL 172 06/01/2018    CHOL 140 02/14/2017       Lab Results   Component Value Date    HDL 60 08/12/2019    HDL 59 06/01/2018    HDL 59 02/14/2017       Lab Results   Component Value Date    LDLCALC 149.0 08/12/2019    LDLCALC 96.8 06/01/2018    LDLCALC 65.0 02/14/2017       Lab Results   Component Value Date    TRIG 115 08/12/2019    TRIG 81 06/01/2018    TRIG 80 02/14/2017       Lab Results   Component Value Date    CHOLHDL 25.9 08/12/2019     CHOLHDL 34.3 06/01/2018    CHOLHDL 42.1 02/14/2017     Lab Results   Component Value Date    ALT 14 06/01/2018    AST 16 06/01/2018    ALKPHOS 83 06/01/2018    BILITOT 0.5 06/01/2018         Peripheral neuropathy 10/08/2014    Morbid obesity 10/08/2014     The patient presents with obesity.  Denies bulimia, amenorrhea, cold intolerance, edema, hip pain, hirsutism, knee pain, polydipsia, polyuria, thirst and weakness.  The patient does not perform regular exercise.  Previous treatments for obesity :self-directed dieting with success.  The patient and I discussed the importance of exercise.    She has continued to gain weight.    Wt Readings from Last 4 Encounters:   08/17/20 (!) 140.6 kg (310 lb)   07/21/20 (!) 141.1 kg (311 lb)   01/15/20 134.3 kg (296 lb)   12/18/19 134.3 kg (296 lb 1.2 oz)                 Tobacco abuse disorder 10/08/2014    DM (diabetes mellitus) type II uncontrolled with eye manifestation      The patient presents with diabetes.  The patient denies polyuria, polydipsia, polyphagia, hypoglycemia and paresthesias.  The patient's glucose control has been good.  Home glucose averages are routinely checked.  The patient is without retinopathy currently.  The patient has no history of neuropathy.  The patient currently complains of no podiatric problems.  The patient has excellent compliance.  Hemoglobin A1C   Date Value Ref Range Status   08/12/2019 7.6 (H) 4.0 - 5.6 % Final     Comment:     ADA Screening Guidelines:  5.7-6.4%  Consistent with prediabetes  >or=6.5%  Consistent with diabetes  High levels of fetal hemoglobin interfere with the HbA1C  assay. Heterozygous hemoglobin variants (HbS, HgC, etc)do  not significantly interfere with this assay.   However, presence of multiple variants may affect accuracy.     05/13/2019 8.5 (H) 4.0 - 5.6 % Final     Comment:     ADA Screening Guidelines:  5.7-6.4%  Consistent with prediabetes  >or=6.5%  Consistent with diabetes  High levels of fetal hemoglobin  interfere with the HbA1C  assay. Heterozygous hemoglobin variants (HbS, HgC, etc)do  not significantly interfere with this assay.   However, presence of multiple variants may affect accuracy.     2018 8.1 (H) 4.0 - 5.6 % Final     Comment:     ADA Screening Guidelines:  5.7-6.4%  Consistent with prediabetes  >or=6.5%  Consistent with diabetes  High levels of fetal hemoglobin interfere with the HbA1C  assay. Heterozygous hemoglobin variants (HbS, HgC, etc)do  not significantly interfere with this assay.   However, presence of multiple variants may affect accuracy.       No results found for: MICROALBUR, [id number]  Diabetes Management Status    Statin: Taking  ACE/ARB: Taking    Screening or Prevention Patient's value Goal Complete/Controlled?   HgA1C Testing and Control   Lab Results   Component Value Date    HGBA1C 7.6 (H) 2019      Annually/Less than 8% Yes   Lipid profile : 2019 Annually Yes   LDL control Lab Results   Component Value Date    LDLCALC 149.0 2019    Annually/Less than 100 mg/dl  No   Nephropathy screening Lab Results   Component Value Date    LABMICR 37.0 05/15/2015     No results found for: PROTEINUA Annually No   Blood pressure BP Readings from Last 1 Encounters:   20 (!) 150/80    Less than 140/90 No   Dilated retinal exam : 2019 Annually Yes   Foot exam   : 2019 Annually No             Recent Labs Obtained:  No visits with results within 7 Day(s) from this visit.   Latest known visit with results is:   Clinical Support on 2022   Component Date Value Ref Range Status    POC Rapid COVID 2022 Negative  Negative Final     Acceptable 2022 Yes   Final       No diagnosis found.     No orders of the defined types were placed in this encounter.     Medications Ordered This Encounter   Medications    azelastine (ASTELIN) 137 mcg (0.1 %) nasal spray     Si sprays (274 mcg total) by Nasal route 2 (two) times daily.     Dispense:   30 mL     Refill:  11    benzonatate (TESSALON) 200 MG capsule     Sig: Take 1 capsule (200 mg total) by mouth 3 (three) times daily as needed for Cough.     Dispense:  30 capsule     Refill:  0        E-Visit Time Tracking:    Day 1 Time (in minutes): 5     Total Time (in minutes): 5

## 2022-10-10 ENCOUNTER — PATIENT MESSAGE (OUTPATIENT)
Dept: FAMILY MEDICINE | Facility: CLINIC | Age: 62
End: 2022-10-10
Payer: COMMERCIAL

## 2022-10-10 RX ORDER — CODEINE PHOSPHATE AND GUAIFENESIN 10; 100 MG/5ML; MG/5ML
10 SOLUTION ORAL 4 TIMES DAILY PRN
Qty: 120 ML | Refills: 0 | Status: SHIPPED | OUTPATIENT
Start: 2022-10-10 | End: 2022-10-20

## 2022-10-10 RX ORDER — METHYLPREDNISOLONE 4 MG/1
TABLET ORAL
Qty: 21 TABLET | Refills: 0 | Status: ON HOLD | OUTPATIENT
Start: 2022-10-10 | End: 2023-01-31 | Stop reason: HOSPADM

## 2022-10-10 RX ORDER — AZITHROMYCIN 250 MG/1
TABLET, FILM COATED ORAL
Qty: 6 TABLET | Refills: 0 | Status: ON HOLD | OUTPATIENT
Start: 2022-10-10 | End: 2023-01-31 | Stop reason: HOSPADM

## 2022-10-10 NOTE — PROGRESS NOTES
Had continued cough.  Will add meds.   Medications Ordered This Encounter   Medications    azelastine (ASTELIN) 137 mcg (0.1 %) nasal spray     Si sprays (274 mcg total) by Nasal route 2 (two) times daily.     Dispense:  30 mL     Refill:  11    azithromycin (Z-RICKIE) 250 MG tablet     Sig: Take as directed.     Dispense:  6 tablet     Refill:  0    benzonatate (TESSALON) 200 MG capsule     Sig: Take 1 capsule (200 mg total) by mouth 3 (three) times daily as needed for Cough.     Dispense:  30 capsule     Refill:  0    guaiFENesin-codeine 100-10 mg/5 ml (TUSSI-ORGANIDIN NR)  mg/5 mL syrup     Sig: Take 10 mLs by mouth 4 (four) times daily as needed for Cough.     Dispense:  120 mL     Refill:  0     Order Specific Question:   I have reviewed the Prescription Drug Monitoring Program (PDMP) database for this patient prior to prescribing the above opioid medication     Answer:   Yes    methylPREDNISolone (MEDROL DOSEPACK) 4 mg tablet     Sig: follow package directions     Dispense:  21 tablet     Refill:  0

## 2022-10-20 ENCOUNTER — PATIENT MESSAGE (OUTPATIENT)
Dept: ADMINISTRATIVE | Facility: HOSPITAL | Age: 62
End: 2022-10-20
Payer: COMMERCIAL

## 2022-10-27 ENCOUNTER — PATIENT MESSAGE (OUTPATIENT)
Dept: FAMILY MEDICINE | Facility: CLINIC | Age: 62
End: 2022-10-27
Payer: COMMERCIAL

## 2022-12-08 ENCOUNTER — PATIENT MESSAGE (OUTPATIENT)
Dept: FAMILY MEDICINE | Facility: CLINIC | Age: 62
End: 2022-12-08
Payer: COMMERCIAL

## 2022-12-08 DIAGNOSIS — Z86.010 HISTORY OF COLON POLYPS: Primary | ICD-10-CM

## 2022-12-08 DIAGNOSIS — Z12.11 COLON CANCER SCREENING: ICD-10-CM

## 2022-12-09 ENCOUNTER — PATIENT MESSAGE (OUTPATIENT)
Dept: FAMILY MEDICINE | Facility: CLINIC | Age: 62
End: 2022-12-09
Payer: COMMERCIAL

## 2022-12-09 DIAGNOSIS — Z12.11 ENCOUNTER FOR SCREENING COLONOSCOPY: Primary | ICD-10-CM

## 2022-12-09 DIAGNOSIS — E11.49 TYPE II DIABETES MELLITUS WITH NEUROLOGICAL MANIFESTATIONS: ICD-10-CM

## 2022-12-09 DIAGNOSIS — F17.210 NICOTINE DEPENDENCE, CIGARETTES, UNCOMPLICATED: ICD-10-CM

## 2022-12-09 NOTE — TELEPHONE ENCOUNTER
I have signed for the following orders AND/OR meds.  Please call the patient and ask the patient to schedule the testing AND/OR inform about any medications that were sent.      Orders Placed This Encounter   Procedures    CT Chest Lung Screening Low Dose     Standing Status:   Future     Standing Expiration Date:   12/9/2023     Order Specific Question:   Does the patient show any signs or symptoms of lung cancer?     Answer:   No     Order Specific Question:   Is this the first (baseline) CT or an annual exam?     Answer:   Annual [2]     Order Specific Question:   Is there documentation of shared decision making for this lung screening exam?     Answer:   Yes     Order Specific Question:   Is this a low dose screening chest CT?     Answer:   Yes    Pneumococcal Conjugate Vaccine (20 Valent) (IM)     Standing Status:   Future     Standing Expiration Date:   6/9/2024    Zoster Recombinant Vaccine     Standing Status:   Future     Standing Expiration Date:   6/9/2023    Zoster Recombinant Vaccine     Standing Status:   Future     Standing Expiration Date:   1/9/2023    Influenza - Quadrivalent (PF)     Standing Status:   Future     Standing Expiration Date:   12/9/2023    Hemoglobin A1C     Standing Status:   Future     Standing Expiration Date:   12/9/2023    Ambulatory referral/consult to Endo Procedure      Standing Status:   Future     Standing Expiration Date:   6/9/2023     Referral Priority:   Routine     Referral Type:   Consultation     Referred to Provider:   Yasir Emmanuel MD     Number of Visits Requested:   1    Ambulatory referral/consult to Podiatry     Standing Status:   Future     Standing Expiration Date:   1/9/2024     Referral Priority:   Routine     Referral Type:   Consultation     Referral Reason:   Specialty Services Required     Requested Specialty:   Podiatry     Number of Visits Requested:   1

## 2022-12-23 ENCOUNTER — PATIENT MESSAGE (OUTPATIENT)
Dept: FAMILY MEDICINE | Facility: CLINIC | Age: 62
End: 2022-12-23
Payer: COMMERCIAL

## 2022-12-29 ENCOUNTER — PATIENT MESSAGE (OUTPATIENT)
Dept: FAMILY MEDICINE | Facility: CLINIC | Age: 62
End: 2022-12-29
Payer: COMMERCIAL

## 2022-12-29 ENCOUNTER — TELEPHONE (OUTPATIENT)
Dept: FAMILY MEDICINE | Facility: CLINIC | Age: 62
End: 2022-12-29
Payer: COMMERCIAL

## 2022-12-29 NOTE — TELEPHONE ENCOUNTER
----- Message from Michelle Montoya sent at 12/29/2022 11:18 AM CST -----  Contact: [phone]  Patient would like to consult with a nurse. She stated she currently have blood in her urine and she also has questions pertaining to a referral. Please call back at [phone]. Thanks mayito

## 2022-12-29 NOTE — TELEPHONE ENCOUNTER
----- Message from Clemencia De La Fuente sent at 12/29/2022  9:43 AM CST -----  Contact: Chelsie Holguin is calling to speak to the nurse regarding blood in her urine. She states she is urinating blood and would like to be seen today if not can a UA be ordered for her. Please give her a call back at [phone]    Thanks  LJ

## 2023-01-17 ENCOUNTER — TELEPHONE (OUTPATIENT)
Dept: FAMILY MEDICINE | Facility: CLINIC | Age: 63
End: 2023-01-17

## 2023-01-17 ENCOUNTER — TELEPHONE (OUTPATIENT)
Dept: CARDIOLOGY | Facility: CLINIC | Age: 63
End: 2023-01-17
Payer: COMMERCIAL

## 2023-01-17 ENCOUNTER — OFFICE VISIT (OUTPATIENT)
Dept: FAMILY MEDICINE | Facility: CLINIC | Age: 63
End: 2023-01-17
Payer: COMMERCIAL

## 2023-01-17 VITALS
SYSTOLIC BLOOD PRESSURE: 138 MMHG | HEART RATE: 76 BPM | RESPIRATION RATE: 18 BRPM | BODY MASS INDEX: 43.4 KG/M2 | WEIGHT: 293 LBS | OXYGEN SATURATION: 90 % | TEMPERATURE: 98 F | HEIGHT: 69 IN | DIASTOLIC BLOOD PRESSURE: 76 MMHG

## 2023-01-17 DIAGNOSIS — Z01.818 PRE-OP EXAMINATION: Primary | ICD-10-CM

## 2023-01-17 DIAGNOSIS — R94.31 ABNORMAL EKG: ICD-10-CM

## 2023-01-17 DIAGNOSIS — E13.649 UNCONTROLLED DIABETES MELLITUS OF OTHER TYPE WITH HYPOGLYCEMIA, UNSPECIFIED HYPOGLYCEMIA COMA STATUS: ICD-10-CM

## 2023-01-17 PROCEDURE — 1159F PR MEDICATION LIST DOCUMENTED IN MEDICAL RECORD: ICD-10-PCS | Mod: CPTII,S$GLB,, | Performed by: NURSE PRACTITIONER

## 2023-01-17 PROCEDURE — 1160F PR REVIEW ALL MEDS BY PRESCRIBER/CLIN PHARMACIST DOCUMENTED: ICD-10-PCS | Mod: CPTII,S$GLB,, | Performed by: NURSE PRACTITIONER

## 2023-01-17 PROCEDURE — 3008F BODY MASS INDEX DOCD: CPT | Mod: CPTII,S$GLB,, | Performed by: NURSE PRACTITIONER

## 2023-01-17 PROCEDURE — 99999 PR PBB SHADOW E&M-EST. PATIENT-LVL V: ICD-10-PCS | Mod: PBBFAC,,, | Performed by: NURSE PRACTITIONER

## 2023-01-17 PROCEDURE — 1160F RVW MEDS BY RX/DR IN RCRD: CPT | Mod: CPTII,S$GLB,, | Performed by: NURSE PRACTITIONER

## 2023-01-17 PROCEDURE — 99213 PR OFFICE/OUTPT VISIT, EST, LEVL III, 20-29 MIN: ICD-10-PCS | Mod: S$GLB,,, | Performed by: NURSE PRACTITIONER

## 2023-01-17 PROCEDURE — 99999 PR PBB SHADOW E&M-EST. PATIENT-LVL V: CPT | Mod: PBBFAC,,, | Performed by: NURSE PRACTITIONER

## 2023-01-17 PROCEDURE — 3075F SYST BP GE 130 - 139MM HG: CPT | Mod: CPTII,S$GLB,, | Performed by: NURSE PRACTITIONER

## 2023-01-17 PROCEDURE — 99213 OFFICE O/P EST LOW 20 MIN: CPT | Mod: S$GLB,,, | Performed by: NURSE PRACTITIONER

## 2023-01-17 PROCEDURE — 3075F PR MOST RECENT SYSTOLIC BLOOD PRESS GE 130-139MM HG: ICD-10-PCS | Mod: CPTII,S$GLB,, | Performed by: NURSE PRACTITIONER

## 2023-01-17 PROCEDURE — 3008F PR BODY MASS INDEX (BMI) DOCUMENTED: ICD-10-PCS | Mod: CPTII,S$GLB,, | Performed by: NURSE PRACTITIONER

## 2023-01-17 PROCEDURE — 1159F MED LIST DOCD IN RCRD: CPT | Mod: CPTII,S$GLB,, | Performed by: NURSE PRACTITIONER

## 2023-01-17 PROCEDURE — 3078F PR MOST RECENT DIASTOLIC BLOOD PRESSURE < 80 MM HG: ICD-10-PCS | Mod: CPTII,S$GLB,, | Performed by: NURSE PRACTITIONER

## 2023-01-17 PROCEDURE — 3078F DIAST BP <80 MM HG: CPT | Mod: CPTII,S$GLB,, | Performed by: NURSE PRACTITIONER

## 2023-01-17 RX ORDER — BLOOD-GLUCOSE SENSOR
EACH MISCELLANEOUS
COMMUNITY
Start: 2022-10-31

## 2023-01-17 RX ORDER — LEVOFLOXACIN 750 MG/1
750 TABLET ORAL
COMMUNITY
Start: 2023-01-16 | End: 2023-01-23

## 2023-01-17 RX ORDER — BLOOD-GLUCOSE SENSOR
EACH MISCELLANEOUS
COMMUNITY
Start: 2022-12-12

## 2023-01-17 RX ORDER — INSULIN ASPART 100 [IU]/ML
6-10 INJECTION, SOLUTION INTRAVENOUS; SUBCUTANEOUS
Status: ON HOLD | COMMUNITY
End: 2023-01-31 | Stop reason: HOSPADM

## 2023-01-17 RX ORDER — BLOOD-GLUCOSE TRANSMITTER
EACH MISCELLANEOUS
COMMUNITY
Start: 2022-10-31

## 2023-01-17 NOTE — PROGRESS NOTES
"Subjective:       Patient ID: Chelsie Garrett is a 62 y.o. female.    Chief Complaint: Pre-op Exam  Pt in today for pre-op examination for transurethral resection of bladder tumor. Pt states, "my surgery was supposed to be yesterday but they cancelled it because they said I needed clearance." Pre-op labs (CBC, BMP, UA), CXR, EKG ordered by [facility] surgeon on 1/11/2023; EKG abnormal, BG high. Pt has uncontrolled diabetes. Pulse oximetry 90% today. Pt informed cardiac clearance needed.   Basic metabolic panel  Order: [id number]   Ref Range & Units 6 d ago   Glucose 65 - 99 mg/dL 176 High     Sodium 136 - 144 mmol/L 139    Potassium 3.6 - 5.1 mmol/L 4.5    Chloride 101 - 111 mmol/L 105    CO2 22 - 32 mmol/L 28    BUN 8 - 20 mg/dL 17    Calcium 8.9 - 10.3 mg/dL 9.5    Creatinine 0.60 - 1.10 mg/dL 0.84    Anion Gap 7 - 16 mmol/L 6 Low     Resulting Agency  [facility]   Specimen Collected: 01/11/23 12:41 Last Resulted: 01/11/23 13:56   Received From: [facility]  Result Received: 01/17/23 09:12     CBC and differential  Order: [id number]   Ref Range & Units 6 d ago   WBC 4.4 - 11.2 10*3/uL 7.0    RBC 4.20 - 5.40 10*6/uL 4.98    HGB 12.0 - 16.0 g/dL 15.3    HCT 37.0 - 47.0 % 46.5    MCV 81.0 - 99.0 fL 93.4    MCH 27.0 - 31.0 pg 30.7    MCHC 33.0 - 37.0 g/dL 32.9 Low     RDW 11.5 - 14.5 % 13.1    Platelet Count 130 - 375 10*3/uL 265    MPV 8.7 - 13.0 fL 9.5    Neutrophils Percent 36.0 - 66.0 % 56.1    Lymphocytes Percent 21.0 - 50.0 % 33.6    Monocytes Percent 2.0 - 10.0 % 7.7    Eosinophils Percent 0.0 - 10.0 % 2.0    Basophils Percent 0.0 - 1.0 % 0.7    Immature Granulocyte % 0.0 - 0.4 % 0.3    Neutrophils Absolute 1.4 - 6.5 10*3/uL 3.9    Lymphocytes Absolute 1.2 - 3.4 10*3/uL 2.3    Monocytes Absolute 0.1 - 1.0 10*3/uL 0.5    Eosinophils Absolute 0.0 - 0.7 10*3/uL 0.1    Basophils Absolute 0.0 - 0.2 10*3/uL 0.1    # Immature Granulocyte 0.00 - 0.03 10*3/uL 0.02    Resulting Agency  [facility] " Collected: 01/11/23 12:41 Last Resulted: 01/11/23 13:35   Received From: [facility]  Result Received: 01/17/23 09:12     Glomerular Filtration Rate  Order: [id number]   Ref Range & Units 6 d ago   GFR Non  >59 mL/min >60    GFR African American >59 mL/min >60    Comment:              STAGES OF CHRONIC KIDNEY DISEASE   STAGE          DESCRIPTION           GFR(mL/min/1.73 m2)   3         Moderate decrease GFR            30-59   4           Severe decrease GFR            15-29   5              Kidney Failure         <15 (or dialysis)   Chronic kidney disease is defined as either kidney damage   or GFR <60mL/min/1.73 m2 for >=3 months. Kidney damage is   defined as pathologic abnormalities or markers of damage,   including abnormalities in blood or urine tests or imaging   studies.   GFR is not calculated for patients under the age of 18.   Resulting Agency  [facility]   Specimen Collected: 01/11/23 12:41 Last Resulted: 01/11/23 13:56   Received From: [facility]  Result Received: 01/17/23 09:12   X-ray chest PA and lateral    Anatomical Region Laterality Modality   Chest -- Digital Radiography     Impression      No acute findings.       Electronically signed by Fei Dill MD on 1/11/2023 1:07 PM  Narrative    REASON FOR EXAM: [D49.4]-Neoplasm of unspecified behavior of bladder     TECHNICAL FACTORS: 2 views     COMPARISON: None     FINDINGS: The lungs are clear. The cardiac silhouette is normal. Pulmonary vasculature is within normal limits. There is no evidence of pleural effusion or pneumothorax. Degenerative changes of the spine and shoulders.   Procedure Note    Fei Dill MD - 01/11/2023   Formatting of this note might be different from the original.   REASON FOR EXAM: [D49.4]-Neoplasm of unspecified behavior of bladder     TECHNICAL FACTORS: 2 views     COMPARISON: None     FINDINGS: The lungs are clear. The cardiac silhouette is normal. Pulmonary  vasculature is within normal limits. There is no evidence of pleural effusion or pneumothorax. Degenerative changes of the spine and shoulders.     IMPRESSION:     No acute findings.       Electronically signed by Fei Dill MD on 1/11/2023 1:07 PM  Exam End: 01/11/23 12:45    Specimen Collected: 01/11/23 13:06 Last Resulted: 01/11/23 13:07   Received From: [facility]  Result Received: 01/17/23 09:12   ECG 12 lead     Ref Range & Units 6 d ago   Ventricular Rate BPM 71    P-R Interval ms 142    QRS Duration ms 102    Q-T Interval ms 394    QTC Calculation ms 428    Calculated P Axis degrees 30    Calculated R Axis degrees -39    Calculated T Axis degrees 54    Interpretation  Sinus rhythm with Premature atrial complexes   Left axis deviation   Incomplete right bundle branch block   Possible Anterior infarct , age undetermined   Abnormal ECG   No previous ECGs available   Confirmed by CLAUDE HUA ([id number]) on 1/11/2023 11:41:05 PM    Resulting Agency  [facility]   Specimen Collected: 01/11/23 12:42 Last Resulted: 01/11/23 23:41   Received From: [facility]  Result Received: 01/17/23 09:12      Contains abnormal data Urinalysis with Reflex  Order: [id number]   Ref Range & Units 2 wk ago   Urine type  CCMS    Color, Urine  POLO    Appearance  CLOUDY    Glucose, Urine NEGATIVE mg/dL 500 Abnormal     Bilirubin, Urine NEGATIVE mg/dL NEGATIVE    Ketones, Urine NEGATIVE mg/dL NEGATIVE    Specific Gravity, Urine 1.005 - 1.030 1.010    Blood, Urine NEGATIVE LARGE Abnormal     pH, Urine 4.5 - 8.0 5.0    Protein, Urine NEGATIVE mg/dL 100 Abnormal     Urobilinogen 0.2 - 1.0 [Felipe'U]/dL 0.2    Nitrite, Urine NEGATIVE NEGATIVE    Leuk. Esterase, Urine NEGATIVE SMALL Abnormal     RBC, Urine 0 - 4 [#]/[HPF] >400 Abnormal     WBC, Urine 0 - 5 [#]/[HPF] 15 High     Epithelial Cells, Urine 0 - 5 [#]/[HPF] 10 High     Casts, Urine 0 - 5 [#]/[LPF] 0    Bacteria, Urine NONE SEEN [#]/[HPF] FEW Abnormal      Resulting Agency  [facility]   Specimen Collected: 12/29/22 16:42 Last Resulted: 12/30/22 11:42   Received From: [facility]  Result Received: 01/17/23 09:12     HPI  Review of Systems   Constitutional: Negative.    HENT: Negative.     Eyes: Negative.    Respiratory: Negative.     Cardiovascular: Negative.    Gastrointestinal: Negative.    Endocrine: Negative.    Genitourinary: Negative.    Musculoskeletal: Negative.    Integumentary:  Negative.   Allergic/Immunologic: Negative.    Neurological: Negative.    Psychiatric/Behavioral: Negative.         Objective:      Physical Exam  Vitals and nursing note reviewed.   Constitutional:       Appearance: Normal appearance.   HENT:      Head: Normocephalic.      Right Ear: Hearing, tympanic membrane, ear canal and external ear normal.      Left Ear: Hearing, tympanic membrane, ear canal and external ear normal.      Nose: Nose normal.      Mouth/Throat:      Mouth: Mucous membranes are moist.      Pharynx: Oropharynx is clear.   Eyes:      Conjunctiva/sclera: Conjunctivae normal.      Pupils: Pupils are equal, round, and reactive to light.   Cardiovascular:      Rate and Rhythm: Normal rate and regular rhythm.      Pulses: Normal pulses.      Heart sounds: Normal heart sounds.   Pulmonary:      Effort: Pulmonary effort is normal.      Breath sounds: Normal breath sounds.   Abdominal:      General: Bowel sounds are normal.      Palpations: Abdomen is soft.   Musculoskeletal:         General: Normal range of motion.      Cervical back: Normal range of motion and neck supple.   Skin:     General: Skin is warm and dry.      Capillary Refill: Capillary refill takes 2 to 3 seconds.   Neurological:      Mental Status: She is alert and oriented to person, place, and time.   Psychiatric:         Mood and Affect: Mood normal.         Behavior: Behavior normal.         Thought Content: Thought content normal.         Judgment: Judgment normal.       Assessment:        Problem List Items Addressed This Visit    None  Visit Diagnoses       Pre-op examination    -  Primary    Relevant Orders    Ambulatory referral/consult to Cardiology    Urinalysis    Abnormal EKG        Relevant Orders    Ambulatory referral/consult to Cardiology    Uncontrolled diabetes mellitus of other type with hypoglycemia, unspecified hypoglycemia coma status        Relevant Medications    insulin aspart U-100 (NOVOLOG) 100 unit/mL (3 mL) InPn pen              Plan:           Chelsie was seen today for pre-op exam.    Diagnoses and all orders for this visit:    Pre-op examination  Abnormal EKG  Uncontrolled diabetes mellitus of other type with hypoglycemia, unspecified hypoglycemia coma status  -     Ambulatory referral/consult to Cardiology; Future  -     Urinalysis        CBC, BMP, UA, EKG, CXR UTD

## 2023-01-17 NOTE — TELEPHONE ENCOUNTER
----- Message from Daniela Tripltet sent at 1/17/2023  4:38 PM CST -----  Pt is requesting a call back concerning an ointment she never received. Call back number .[phone]  Thx jm

## 2023-01-17 NOTE — TELEPHONE ENCOUNTER
Attempted to contact office, left message regarding patient needing surgery clearance from Cardiology.

## 2023-01-17 NOTE — TELEPHONE ENCOUNTER
Left voicemail for Belinda informing her that we have received clearance form and pt is scheduled to see Dr. Medrano 1/19/23.     ----- Message from Carin Villanueva sent at 1/17/2023  4:43 PM CST -----  Contact: Belinda/ Dr. Mendoza Office  Belinda with Dr. Mendoza office is calling to speak with the nurse in regards to paperwork. Reports needing to know if paperwork was received for patients clearance for surgery. Please give Belinda a callback at [phone] ext [phone]

## 2023-01-17 NOTE — TELEPHONE ENCOUNTER
----- Message from Michelle Montoya sent at 1/17/2023 11:55 AM CST -----  Contact: [phone]  Lashay smith Eagar would like to consult with a nurse in regards to the patient pre op clearance. Please call back at [phone]. Thanks mayito

## 2023-01-18 RX ORDER — CLOTRIMAZOLE AND BETAMETHASONE DIPROPIONATE 10; .64 MG/G; MG/G
CREAM TOPICAL 2 TIMES DAILY
Qty: 45 G | Refills: 0 | Status: SHIPPED | OUTPATIENT
Start: 2023-01-18 | End: 2023-01-25

## 2023-01-19 ENCOUNTER — OFFICE VISIT (OUTPATIENT)
Dept: CARDIOLOGY | Facility: CLINIC | Age: 63
End: 2023-01-19
Payer: COMMERCIAL

## 2023-01-19 ENCOUNTER — HOSPITAL ENCOUNTER (OUTPATIENT)
Dept: CARDIOLOGY | Facility: HOSPITAL | Age: 63
Discharge: HOME | End: 2023-01-19
Attending: INTERNAL MEDICINE
Payer: COMMERCIAL

## 2023-01-19 VITALS
WEIGHT: 278.38 LBS | DIASTOLIC BLOOD PRESSURE: 68 MMHG | HEIGHT: 69 IN | OXYGEN SATURATION: 98 % | SYSTOLIC BLOOD PRESSURE: 132 MMHG | HEART RATE: 82 BPM | BODY MASS INDEX: 41.23 KG/M2

## 2023-01-19 DIAGNOSIS — E66.01 MORBID OBESITY: Primary | ICD-10-CM

## 2023-01-19 DIAGNOSIS — F17.210 CIGARETTE NICOTINE DEPENDENCE WITHOUT COMPLICATION: ICD-10-CM

## 2023-01-19 DIAGNOSIS — E78.5 HYPERLIPIDEMIA ASSOCIATED WITH TYPE 2 DIABETES MELLITUS: ICD-10-CM

## 2023-01-19 DIAGNOSIS — R94.31 ABNORMAL EKG: ICD-10-CM

## 2023-01-19 DIAGNOSIS — Z01.810 PREOP CARDIOVASCULAR EXAM: ICD-10-CM

## 2023-01-19 DIAGNOSIS — E11.69 HYPERLIPIDEMIA ASSOCIATED WITH TYPE 2 DIABETES MELLITUS: ICD-10-CM

## 2023-01-19 DIAGNOSIS — Z01.818 PRE-OP EXAMINATION: ICD-10-CM

## 2023-01-19 DIAGNOSIS — Z01.810 PREOP CARDIOVASCULAR EXAM: Primary | ICD-10-CM

## 2023-01-19 PROCEDURE — 99204 PR OFFICE/OUTPT VISIT, NEW, LEVL IV, 45-59 MIN: ICD-10-PCS | Mod: S$PBB,,, | Performed by: INTERNAL MEDICINE

## 2023-01-19 PROCEDURE — 93010 EKG 12-LEAD: ICD-10-PCS | Mod: ,,, | Performed by: INTERNAL MEDICINE

## 2023-01-19 PROCEDURE — 93010 ELECTROCARDIOGRAM REPORT: CPT | Mod: ,,, | Performed by: INTERNAL MEDICINE

## 2023-01-19 PROCEDURE — 99204 OFFICE O/P NEW MOD 45 MIN: CPT | Mod: S$PBB,,, | Performed by: INTERNAL MEDICINE

## 2023-01-19 PROCEDURE — 93005 ELECTROCARDIOGRAM TRACING: CPT | Mod: PO

## 2023-01-19 PROCEDURE — 99999 PR PBB SHADOW E&M-EST. PATIENT-LVL V: CPT | Mod: PBBFAC,,, | Performed by: INTERNAL MEDICINE

## 2023-01-19 PROCEDURE — 99999 PR PBB SHADOW E&M-EST. PATIENT-LVL V: ICD-10-PCS | Mod: PBBFAC,,, | Performed by: INTERNAL MEDICINE

## 2023-01-19 NOTE — PROGRESS NOTES
Subjective:    Patient ID:  Chelsie Garrett is a 62 y.o. female who presents for evaluation of Pre-op Exam and Abnormal ECG      HPI62 yo WF with morbid obesity, HLD, post COVID with some chronic SOB related to smoking and obesity here for preop clearance. No hx of CP. No hx of MI, heart failure or cardiac arrhythmias . Resting EKG normal.    Review of Systems   Constitutional: Negative for decreased appetite, fever, malaise/fatigue, weight gain and weight loss.   HENT:  Negative for hearing loss and nosebleeds.    Eyes:  Negative for visual disturbance.   Cardiovascular:  Positive for dyspnea on exertion. Negative for chest pain, claudication, cyanosis, irregular heartbeat, leg swelling, near-syncope, orthopnea, palpitations, paroxysmal nocturnal dyspnea and syncope.   Respiratory:  Positive for shortness of breath. Negative for cough, hemoptysis, sleep disturbances due to breathing, snoring and wheezing.    Endocrine: Negative for cold intolerance, heat intolerance, polydipsia and polyuria.   Hematologic/Lymphatic: Negative for adenopathy and bleeding problem. Does not bruise/bleed easily.   Skin:  Negative for color change, itching, poor wound healing, rash and suspicious lesions.   Musculoskeletal:  Positive for arthritis and back pain. Negative for falls, joint pain, joint swelling, muscle cramps, muscle weakness and myalgias.   Gastrointestinal:  Negative for bloating, abdominal pain, change in bowel habit, constipation, flatus, heartburn, hematemesis, hematochezia, hemorrhoids, jaundice, melena, nausea and vomiting.   Genitourinary:  Negative for bladder incontinence, decreased libido, frequency, hematuria, hesitancy and urgency.   Neurological:  Negative for brief paralysis, difficulty with concentration, excessive daytime sleepiness, dizziness, focal weakness, headaches, light-headedness, loss of balance, numbness, vertigo and weakness.   Psychiatric/Behavioral:  Negative for altered mental status,  "depression and memory loss. The patient does not have insomnia and is not nervous/anxious.    Allergic/Immunologic: Negative for environmental allergies, hives and persistent infections.      Objective:    Physical Exam  Vitals and nursing note reviewed.   Constitutional:       Appearance: She is well-developed. She is obese.      Comments: /68   Pulse 82   Ht 5' 9" (1.753 m)   Wt 126.3 kg (278 lb 6.4 oz)   SpO2 98%   BMI 41.11 kg/m²      HENT:      Head: Normocephalic and atraumatic.      Right Ear: External ear normal.      Left Ear: External ear normal.      Nose: Nose normal.   Eyes:      General: Lids are normal. No scleral icterus.        Right eye: No discharge.         Left eye: No discharge.      Conjunctiva/sclera: Conjunctivae normal.      Right eye: No hemorrhage.     Pupils: Pupils are equal, round, and reactive to light.   Neck:      Thyroid: No thyromegaly.      Vascular: No JVD.      Trachea: No tracheal deviation.   Cardiovascular:      Rate and Rhythm: Normal rate and regular rhythm.      Pulses: Intact distal pulses.      Heart sounds: Normal heart sounds. No murmur heard.    No friction rub. No gallop.   Pulmonary:      Effort: Pulmonary effort is normal. No respiratory distress.      Breath sounds: Normal breath sounds. No wheezing or rales.   Chest:      Chest wall: No tenderness.   Breasts:     Breasts are symmetrical.   Abdominal:      General: Bowel sounds are normal. There is no distension.      Palpations: Abdomen is soft. There is no hepatomegaly or mass.      Tenderness: There is no abdominal tenderness. There is no guarding or rebound.   Musculoskeletal:         General: No tenderness. Normal range of motion.      Cervical back: Normal range of motion and neck supple.   Lymphadenopathy:      Cervical: No cervical adenopathy.   Skin:     General: Skin is warm and dry.      Coloration: Skin is not pale.      Findings: No erythema or rash.   Neurological:      Mental Status: She " is alert and oriented to person, place, and time.      Cranial Nerves: No cranial nerve deficit.      Coordination: Coordination normal.      Deep Tendon Reflexes: Reflexes are normal and symmetric. Reflexes normal.   Psychiatric:         Behavior: Behavior normal.         Thought Content: Thought content normal.         Judgment: Judgment normal.         Assessment:       1. Morbid obesity    2. Pre-op examination    3. Abnormal EKG    4. Cigarette nicotine dependence without complication    5. Hyperlipidemia associated with type 2 diabetes mellitus    6. Preop cardiovascular exam         Plan:      No known cardiac disease   There are no absolute contraindications to planned surgery from a cardiac standpoint. Will defer non-cardiac medical issues to patient's other physicians. Cardiac and hemodynamic monitoring during surgery and post operative period consistent with regions standard of care should be initiated. Patient should be aware that all surgeries carry risk and unpredictable cardiac events may still occur.    No orders of the defined types were placed in this encounter.    Follow up if symptoms worsen or fail to improve.

## 2023-01-20 ENCOUNTER — TELEPHONE (OUTPATIENT)
Dept: FAMILY MEDICINE | Facility: CLINIC | Age: 63
End: 2023-01-20
Payer: COMMERCIAL

## 2023-01-20 NOTE — TELEPHONE ENCOUNTER
Cardiology clearance received today. Labs, radiology studies reviewed; pt cleared for surgery. Please send required documents to surgeon. Please call pt and inform.

## 2023-01-23 ENCOUNTER — TELEPHONE (OUTPATIENT)
Dept: FAMILY MEDICINE | Facility: CLINIC | Age: 63
End: 2023-01-23
Payer: COMMERCIAL

## 2023-01-23 DIAGNOSIS — D49.4 BLADDER TUMOR: Primary | ICD-10-CM

## 2023-01-23 NOTE — TELEPHONE ENCOUNTER
I highly recommend Dr. Hansen or Dr. Burrows and have placed a referral.   .      I have signed for the following orders AND/OR meds.  Please call the patient and ask the patient to schedule the testing AND/OR inform about any medications that were sent.      Orders Placed This Encounter   Procedures    Ambulatory referral/consult to Urology     Standing Status:   Future     Standing Expiration Date:   2/23/2024     Referral Priority:   Routine     Referral Type:   Consultation     Referral Reason:   Specialty Services Required     Referred to Provider:   Marlon Clark MD     Requested Specialty:   Urology     Number of Visits Requested:   1

## 2023-01-23 NOTE — TELEPHONE ENCOUNTER
----- Message from Charley Garvin sent at 1/23/2023  9:10 AM CST -----  Contact: self  Pt is asking for an return call in reference to needing schedule with an urologist due to finding out she has bladder cancer; pt was scheduled to have procedure done at [facility] but the cost was to  high due to insurance being out of network so she is needing an urologist within [facility],please call back at .[phone] Thx CJ

## 2023-01-24 ENCOUNTER — PATIENT MESSAGE (OUTPATIENT)
Dept: UROLOGY | Facility: CLINIC | Age: 63
End: 2023-01-24
Payer: COMMERCIAL

## 2023-01-24 ENCOUNTER — PATIENT MESSAGE (OUTPATIENT)
Dept: FAMILY MEDICINE | Facility: CLINIC | Age: 63
End: 2023-01-24
Payer: COMMERCIAL

## 2023-01-25 ENCOUNTER — PATIENT MESSAGE (OUTPATIENT)
Dept: ADMINISTRATIVE | Facility: HOSPITAL | Age: 63
End: 2023-01-25
Payer: COMMERCIAL

## 2023-01-25 NOTE — TELEPHONE ENCOUNTER
Multiple messages have been sent to Dr. Clark's staff since 1/23/23 with no response to assist with a sooner appointment than 2/24/23 regarding her bladder tumor . Is the 2/24 appointment okay or can you reach out to Dr. Clark to see if he can assist with his staff contacting the patient for a sooner appointment?

## 2023-01-27 ENCOUNTER — OFFICE VISIT (OUTPATIENT)
Dept: UROLOGY | Facility: CLINIC | Age: 63
End: 2023-01-27
Payer: COMMERCIAL

## 2023-01-27 VITALS
WEIGHT: 293 LBS | SYSTOLIC BLOOD PRESSURE: 130 MMHG | BODY MASS INDEX: 43.4 KG/M2 | DIASTOLIC BLOOD PRESSURE: 81 MMHG | HEART RATE: 78 BPM | HEIGHT: 69 IN

## 2023-01-27 DIAGNOSIS — D49.4 BLADDER TUMOR: Primary | ICD-10-CM

## 2023-01-27 PROCEDURE — 99204 OFFICE O/P NEW MOD 45 MIN: CPT | Mod: S$GLB,,, | Performed by: UROLOGY

## 2023-01-27 PROCEDURE — 1159F MED LIST DOCD IN RCRD: CPT | Mod: CPTII,S$GLB,, | Performed by: UROLOGY

## 2023-01-27 PROCEDURE — 99999 PR PBB SHADOW E&M-EST. PATIENT-LVL V: ICD-10-PCS | Mod: PBBFAC,,, | Performed by: UROLOGY

## 2023-01-27 PROCEDURE — 99999 PR PBB SHADOW E&M-EST. PATIENT-LVL V: CPT | Mod: PBBFAC,,, | Performed by: UROLOGY

## 2023-01-27 PROCEDURE — 1160F RVW MEDS BY RX/DR IN RCRD: CPT | Mod: CPTII,S$GLB,, | Performed by: UROLOGY

## 2023-01-27 PROCEDURE — 3008F PR BODY MASS INDEX (BMI) DOCUMENTED: ICD-10-PCS | Mod: CPTII,S$GLB,, | Performed by: UROLOGY

## 2023-01-27 PROCEDURE — 3079F DIAST BP 80-89 MM HG: CPT | Mod: CPTII,S$GLB,, | Performed by: UROLOGY

## 2023-01-27 PROCEDURE — 1160F PR REVIEW ALL MEDS BY PRESCRIBER/CLIN PHARMACIST DOCUMENTED: ICD-10-PCS | Mod: CPTII,S$GLB,, | Performed by: UROLOGY

## 2023-01-27 PROCEDURE — 3008F BODY MASS INDEX DOCD: CPT | Mod: CPTII,S$GLB,, | Performed by: UROLOGY

## 2023-01-27 PROCEDURE — 3075F PR MOST RECENT SYSTOLIC BLOOD PRESS GE 130-139MM HG: ICD-10-PCS | Mod: CPTII,S$GLB,, | Performed by: UROLOGY

## 2023-01-27 PROCEDURE — 1159F PR MEDICATION LIST DOCUMENTED IN MEDICAL RECORD: ICD-10-PCS | Mod: CPTII,S$GLB,, | Performed by: UROLOGY

## 2023-01-27 PROCEDURE — 3075F SYST BP GE 130 - 139MM HG: CPT | Mod: CPTII,S$GLB,, | Performed by: UROLOGY

## 2023-01-27 PROCEDURE — 3079F PR MOST RECENT DIASTOLIC BLOOD PRESSURE 80-89 MM HG: ICD-10-PCS | Mod: CPTII,S$GLB,, | Performed by: UROLOGY

## 2023-01-27 PROCEDURE — 99204 PR OFFICE/OUTPT VISIT, NEW, LEVL IV, 45-59 MIN: ICD-10-PCS | Mod: S$GLB,,, | Performed by: UROLOGY

## 2023-01-27 NOTE — H&P (VIEW-ONLY)
Chief Complaint:   Encounter Diagnosis   Name Primary?    Bladder tumor Yes       HPI:  62-year-old female who comes in from [place] with a new diagnosis of possible bladder cancer.  She states this past summer she had difficulty voiding and a Tomas catheter was placed.  She did have a little bit hematuria afterwards, could have been related to the catheter placement.  She was doing well until on proximally [place] she began to have gross hematuria with no other significant symptoms.  This eventually cleared up on its own, CT scan demonstrated possible bladder masses and she was referred to an outside urologist.  Cystoscopy demonstrated significant tumor surrounding the right ureteral orifice, surgeries were attempted, but could not get scheduled due to other factors for preoperative clearance.  Patient is now cleared, but has decided to come to [facility] for treatment.  Patient is a current smoker, no other lower urinary tract symptoms.  No other urological or gynecological history, she has all of her female organs.  No evidence of true incontinence, no constipation.  She would an uncle with bladder cancer, no other family history of urological cancers or stones.    Allergies:  Penicillins    Medications:  has a current medication list which includes the following prescription(s): atorvastatin, azelastine, benzonatate, dexcom g6 sensor, cycloset, dexcom g6 sensor, dexcom g6 transmitter, ezetimibe, baqsimi, insulin aspart u-100, insulin aspart u-100, lantus solostar u-100 insulin, lisinopril, methylprednisolone, multivitamin, timolol maleate 0.5%, trulicity, and azithromycin.    Review of Systems:  General: No fever, chills, fatigability, or weight loss.  Skin: No rashes, itching, or changes in color or texture of skin.  Chest: Denies EASON, cyanosis, wheezing, cough, and sputum production.  Abdomen: Appetite fine. No weight loss. Denies diarrhea, abdominal pain, hematemesis, or blood in stool.  Musculoskeletal: No  joint stiffness or swelling. Denies back pain.  : As above.  All other review of systems negative.    PMH:   has a past medical history of Cigarette nicotine dependence without complication (2017), Colon polyp, DM (diabetes mellitus) type II uncontrolled with eye manifestation, Hyperplastic colon polyp (2015), and Morbid obesity (10/8/2014).    PSH:   has a past surgical history that includes  section.    FamHx: family history includes Bladder Cancer in her paternal uncle; Coronary artery disease in her father; Diabetes in her father; Hypertension in her father; Lung cancer in her father; Lymphoma in her paternal uncle; Pancreatic cancer in her mother; Stroke in her father.    SocHx:  reports that she has been smoking cigarettes. She has a 55.50 pack-year smoking history. She has never used smokeless tobacco. She reports that she does not drink alcohol and does not use drugs.      Physical Exam:  Vitals:    23 1033   BP: 130/81   Pulse: 78     General: A&Ox3, no apparent distress, no deformities  Neck: No masses, normal ROM  Lungs: normal inspiration, no use of accessory muscles  Heart: normal pulse, no arrhythmias  Abdomen: Soft, NT, ND, no masses, no hernias, no hepatosplenomegaly  Skin: The skin is warm and dry. No jaundice.  Ext: No c/c/e.    Labs/Studies:   CT urogram right bladder wall tumor     Impression/Plan:     Bladder mass- while initially presenting with gross hematuria, CT scan and cystoscopy at the outside facility both demonstrate a large tumor on the right lateral wall surrounding the right ureteral orifice.  Patient is adamant that she would like to pursue surgical management, will schedule for cystoscopy, TURBT, possible stent placement, and bilateral retrogrades.      Patient understands the risks, benefits and alternatives of the above-stated procedure.  These include but not limited to damage to the surrounding structures including the urethra, prostate, ureters  and bladder.  Risk of the need for stent placement, perforation requiring open procedures, Tomas catheterization at the conclusion of the procedure.  Risk of recurrence or need for further surgeries.  Risk of pain, hematuria, infections.  Risk of heart attack, stroke, death, DVT and PE.  Patient understands he may require hospitalization post procedure, understanding of all the above he has elected to pursue.

## 2023-01-27 NOTE — PROGRESS NOTES
Chief Complaint:   Encounter Diagnosis   Name Primary?    Bladder tumor Yes       HPI:  62-year-old female who comes in from [place] with a new diagnosis of possible bladder cancer.  She states this past summer she had difficulty voiding and a Tomas catheter was placed.  She did have a little bit hematuria afterwards, could have been related to the catheter placement.  She was doing well until on proximally [place] she began to have gross hematuria with no other significant symptoms.  This eventually cleared up on its own, CT scan demonstrated possible bladder masses and she was referred to an outside urologist.  Cystoscopy demonstrated significant tumor surrounding the right ureteral orifice, surgeries were attempted, but could not get scheduled due to other factors for preoperative clearance.  Patient is now cleared, but has decided to come to [facility] for treatment.  Patient is a current smoker, no other lower urinary tract symptoms.  No other urological or gynecological history, she has all of her female organs.  No evidence of true incontinence, no constipation.  She would an uncle with bladder cancer, no other family history of urological cancers or stones.    Allergies:  Penicillins    Medications:  has a current medication list which includes the following prescription(s): atorvastatin, azelastine, benzonatate, dexcom g6 sensor, cycloset, dexcom g6 sensor, dexcom g6 transmitter, ezetimibe, baqsimi, insulin aspart u-100, insulin aspart u-100, lantus solostar u-100 insulin, lisinopril, methylprednisolone, multivitamin, timolol maleate 0.5%, trulicity, and azithromycin.    Review of Systems:  General: No fever, chills, fatigability, or weight loss.  Skin: No rashes, itching, or changes in color or texture of skin.  Chest: Denies EASON, cyanosis, wheezing, cough, and sputum production.  Abdomen: Appetite fine. No weight loss. Denies diarrhea, abdominal pain, hematemesis, or blood in stool.  Musculoskeletal: No  joint stiffness or swelling. Denies back pain.  : As above.  All other review of systems negative.    PMH:   has a past medical history of Cigarette nicotine dependence without complication (2017), Colon polyp, DM (diabetes mellitus) type II uncontrolled with eye manifestation, Hyperplastic colon polyp (2015), and Morbid obesity (10/8/2014).    PSH:   has a past surgical history that includes  section.    FamHx: family history includes Bladder Cancer in her paternal uncle; Coronary artery disease in her father; Diabetes in her father; Hypertension in her father; Lung cancer in her father; Lymphoma in her paternal uncle; Pancreatic cancer in her mother; Stroke in her father.    SocHx:  reports that she has been smoking cigarettes. She has a 55.50 pack-year smoking history. She has never used smokeless tobacco. She reports that she does not drink alcohol and does not use drugs.      Physical Exam:  Vitals:    23 1033   BP: 130/81   Pulse: 78     General: A&Ox3, no apparent distress, no deformities  Neck: No masses, normal ROM  Lungs: normal inspiration, no use of accessory muscles  Heart: normal pulse, no arrhythmias  Abdomen: Soft, NT, ND, no masses, no hernias, no hepatosplenomegaly  Skin: The skin is warm and dry. No jaundice.  Ext: No c/c/e.    Labs/Studies:   CT urogram right bladder wall tumor     Impression/Plan:     Bladder mass- while initially presenting with gross hematuria, CT scan and cystoscopy at the outside facility both demonstrate a large tumor on the right lateral wall surrounding the right ureteral orifice.  Patient is adamant that she would like to pursue surgical management, will schedule for cystoscopy, TURBT, possible stent placement, and bilateral retrogrades.      Patient understands the risks, benefits and alternatives of the above-stated procedure.  These include but not limited to damage to the surrounding structures including the urethra, prostate, ureters  and bladder.  Risk of the need for stent placement, perforation requiring open procedures, Tomas catheterization at the conclusion of the procedure.  Risk of recurrence or need for further surgeries.  Risk of pain, hematuria, infections.  Risk of heart attack, stroke, death, DVT and PE.  Patient understands he may require hospitalization post procedure, understanding of all the above he has elected to pursue.

## 2023-01-30 ENCOUNTER — TELEPHONE (OUTPATIENT)
Dept: FAMILY MEDICINE | Facility: CLINIC | Age: 63
End: 2023-01-30
Payer: COMMERCIAL

## 2023-01-30 DIAGNOSIS — G47.30 SLEEP APNEA, UNSPECIFIED TYPE: Primary | ICD-10-CM

## 2023-01-30 NOTE — TELEPHONE ENCOUNTER
----- Message from Amita Rojas RN sent at 1/30/2023  9:53 AM CST -----  Regarding: Sleep study  Good morning, this pt states she was not contacted r/t abnormal Sleep Study results of 6/28/21. She is concerned she may need a CPAP. Thank you

## 2023-01-30 NOTE — TELEPHONE ENCOUNTER
I have signed for the following orders AND/OR meds.  Please call the patient and ask the patient to schedule the testing AND/OR inform about any medications that were sent.      Orders Placed This Encounter   Procedures    Ambulatory referral/consult to Sleep Disorders     Standing Status:   Future     Standing Expiration Date:   2/29/2024     Referral Priority:   Routine     Referral Type:   Consultation     Number of Visits Requested:   1

## 2023-01-30 NOTE — TELEPHONE ENCOUNTER
Pt was given results in 06/2021, she did not want to go to [place] so she was given number to call Dr Lesley Beck.  Spoke to patient and she now wants to go to [place], she needs an updated referral

## 2023-01-30 NOTE — PRE ADMISSION SCREENING
Pre op instructions reviewed with patient per phone on 1/30/23: Spoke about pre op process and surgery instructions, verbalized understanding.    Surgery is scheduled on 1/31/23.  We will call you the business day prior to surgery to confirm arrival time (after 2:30 pm), as it is subject to change due to cancellations & emergencies.    Please report to the [facility] (1st Floor) at [facility] located off of [facility] (2nd building on the left, in front of the [facility]),address: [address] [place] [address] [place]. [address]      INSTRUCTIONS IMPORTANT!!!  Do Not Eat, Drink, or Smoke after 12 midnight! NO WATER after midnight! OK to brush teeth, no gum, candy or mints!     Take only these medicines with a small swallow of water-morning of surgery:  Eyedrops    Hold evening Cycloset and Novolog, prior to surgery, tonight.     Continue to hold Multi-vitamin, prior to surgery.    ____  NO Acrylic/fake nails or nail polish worn day of surgery (specifically hand/arm & foot surgeries).  ____  NO powder, lotions, deodorants, oils or creams on body.  ____  Please Remove All jewelry & piercings prior to surgery.  ____  Please Remove Dentures, Hearing Aids & Contact Lens prior to the start of surgery.  ____  Please bring photo ID and insurance information to hospital (Leave Valuables at Home).  ____  If going home the same day, arrange for a ride home. You will not be able to drive 24 hrs if Anesthesia was used.   ____  Females (ages 11-60) may need to give a urine sample the morning of surgery; please see Pre op Nurse prior to voiding.  ____  Wear clean, loose fitting clothing. Allow for dressings, bandages.  ____  Stop all Aspirin products, Ibuprofen, Advil, Motrin & Aleve at least 5-7 days before surgery, unless otherwise instructed by your doctor, or the nurse.   ____  Blood Thinners are stopped based on your Provider's recommendation; Call Surgeon's Office to inquire when to stop/hold.  ____  Stop taking any Fish Oil  supplements or Vitamins at least 5 days prior to surgery, unless instructed otherwise by your Doctor.          Diabetic Patients: If you take diabetic medication, do NOT take morning of surgery unless instructed by             Doctor. Metformin to be stopped 24 hrs prior to surgery time. DO NOT take long-acting insulin the evening before surgery. Blood sugars will be checked in pre-op morning of.    Bathing Instructions:    -Do not shave your face or body the day before or the day of surgery.              -Shower & Rinse your body as usual with anti-bacterial Soap (Dial)              -Rinse your body thoroughly.     [facility] Visitor/Ride Policy:  Only 2 adults allowed (over the age of 18) to accompany you to the Hospital. You Must have a ride home from a responsible adult that you know and trust. Medical Transport, Uber or Lyft can only be used if patient has a responsible adult to accompany them during ride home.    Post-Op Instructions: You will receive Post-op/Discharge instructions by your Discharge Nurse prior to going home. Please call your Surgeon's office with any post-surgery questions/concerns.    *Call [facility] Pre-Admissions Department with surgery instruction questions @ [phone], [phone] or [phone] (Mon-Fri 8 am to 4 pm)    *If you are running late or have questions the morning of surgery, please call the Surgery Dept @ [phone]  *Insurance/ Financial Questions, please call [phone].

## 2023-01-31 ENCOUNTER — ANESTHESIA (OUTPATIENT)
Dept: SURGERY | Facility: HOSPITAL | Age: 63
End: 2023-01-31
Payer: COMMERCIAL

## 2023-01-31 ENCOUNTER — ANESTHESIA EVENT (OUTPATIENT)
Dept: SURGERY | Facility: HOSPITAL | Age: 63
End: 2023-01-31
Payer: COMMERCIAL

## 2023-01-31 ENCOUNTER — HOSPITAL ENCOUNTER (OUTPATIENT)
Facility: HOSPITAL | Age: 63
Discharge: HOME | End: 2023-01-31
Attending: UROLOGY | Admitting: UROLOGY
Payer: COMMERCIAL

## 2023-01-31 DIAGNOSIS — D49.4 BLADDER TUMOR: ICD-10-CM

## 2023-01-31 LAB — POCT GLUCOSE: 153 MG/DL (ref 70–110)

## 2023-01-31 PROCEDURE — 71000015 HC POSTOP RECOV 1ST HR: Performed by: UROLOGY

## 2023-01-31 PROCEDURE — 88307 TISSUE EXAM BY PATHOLOGIST: CPT | Mod: 26,,, | Performed by: PATHOLOGY

## 2023-01-31 PROCEDURE — 71000033 HC RECOVERY, INTIAL HOUR: Performed by: UROLOGY

## 2023-01-31 PROCEDURE — 37000008 HC ANESTHESIA 1ST 15 MINUTES: Performed by: UROLOGY

## 2023-01-31 PROCEDURE — C1729 CATH, DRAINAGE: HCPCS | Performed by: UROLOGY

## 2023-01-31 PROCEDURE — 63600175 PHARM REV CODE 636 W HCPCS: Performed by: NURSE ANESTHETIST, CERTIFIED REGISTERED

## 2023-01-31 PROCEDURE — 36000706: Performed by: UROLOGY

## 2023-01-31 PROCEDURE — 25000003 PHARM REV CODE 250: Performed by: NURSE ANESTHETIST, CERTIFIED REGISTERED

## 2023-01-31 PROCEDURE — 74420 UROGRAPHY RTRGR +-KUB: CPT | Mod: 26,,, | Performed by: UROLOGY

## 2023-01-31 PROCEDURE — 25000003 PHARM REV CODE 250: Performed by: UROLOGY

## 2023-01-31 PROCEDURE — 52235 PR CYSTOURETHROSCOPY,FULGUR 2-5 CM LESN: ICD-10-PCS | Mod: ,,, | Performed by: UROLOGY

## 2023-01-31 PROCEDURE — 74420 PR  X-RAY RETROGRADE PYELOGRAM: ICD-10-PCS | Mod: 26,,, | Performed by: UROLOGY

## 2023-01-31 PROCEDURE — 88307 TISSUE EXAM BY PATHOLOGIST: CPT | Performed by: PATHOLOGY

## 2023-01-31 PROCEDURE — 25500020 PHARM REV CODE 255: Performed by: UROLOGY

## 2023-01-31 PROCEDURE — 71000039 HC RECOVERY, EACH ADD'L HOUR: Performed by: UROLOGY

## 2023-01-31 PROCEDURE — C1758 CATHETER, URETERAL: HCPCS | Performed by: UROLOGY

## 2023-01-31 PROCEDURE — 37000009 HC ANESTHESIA EA ADD 15 MINS: Performed by: UROLOGY

## 2023-01-31 PROCEDURE — C2617 STENT, NON-COR, TEM W/O DEL: HCPCS | Performed by: UROLOGY

## 2023-01-31 PROCEDURE — 25000003 PHARM REV CODE 250: Performed by: STUDENT IN AN ORGANIZED HEALTH CARE EDUCATION/TRAINING PROGRAM

## 2023-01-31 PROCEDURE — 88307 PR  SURG PATH,LEVEL V: ICD-10-PCS | Mod: 26,,, | Performed by: PATHOLOGY

## 2023-01-31 PROCEDURE — 52235 CYSTOSCOPY AND TREATMENT: CPT | Mod: ,,, | Performed by: UROLOGY

## 2023-01-31 PROCEDURE — 63600175 PHARM REV CODE 636 W HCPCS: Performed by: STUDENT IN AN ORGANIZED HEALTH CARE EDUCATION/TRAINING PROGRAM

## 2023-01-31 PROCEDURE — 27201423 OPTIME MED/SURG SUP & DEVICES STERILE SUPPLY: Performed by: UROLOGY

## 2023-01-31 PROCEDURE — C1769 GUIDE WIRE: HCPCS | Performed by: UROLOGY

## 2023-01-31 PROCEDURE — 36000707: Performed by: UROLOGY

## 2023-01-31 DEVICE — STENT URETERAL UNIV 7FR 26CM: Type: IMPLANTABLE DEVICE | Site: URETER | Status: FUNCTIONAL

## 2023-01-31 RX ORDER — LIDOCAINE HYDROCHLORIDE 20 MG/ML
INJECTION, SOLUTION EPIDURAL; INFILTRATION; INTRACAUDAL; PERINEURAL
Status: DISCONTINUED | OUTPATIENT
Start: 2023-01-31 | End: 2023-01-31

## 2023-01-31 RX ORDER — MEPERIDINE HYDROCHLORIDE 25 MG/ML
12.5 INJECTION INTRAMUSCULAR; INTRAVENOUS; SUBCUTANEOUS ONCE AS NEEDED
Status: COMPLETED | OUTPATIENT
Start: 2023-01-31 | End: 2023-01-31

## 2023-01-31 RX ORDER — ROCURONIUM BROMIDE 10 MG/ML
INJECTION, SOLUTION INTRAVENOUS
Status: DISCONTINUED | OUTPATIENT
Start: 2023-01-31 | End: 2023-01-31

## 2023-01-31 RX ORDER — OXYCODONE AND ACETAMINOPHEN 5; 325 MG/1; MG/1
1 TABLET ORAL EVERY 4 HOURS PRN
Status: DISCONTINUED | OUTPATIENT
Start: 2023-01-31 | End: 2023-01-31 | Stop reason: HOSPADM

## 2023-01-31 RX ORDER — MIDAZOLAM HYDROCHLORIDE 1 MG/ML
INJECTION, SOLUTION INTRAMUSCULAR; INTRAVENOUS
Status: DISCONTINUED | OUTPATIENT
Start: 2023-01-31 | End: 2023-01-31

## 2023-01-31 RX ORDER — HYOSCYAMINE SULFATE 0.12 MG/1
0.12 TABLET SUBLINGUAL EVERY 4 HOURS PRN
Status: DISCONTINUED | OUTPATIENT
Start: 2023-01-31 | End: 2023-01-31 | Stop reason: HOSPADM

## 2023-01-31 RX ORDER — PHENAZOPYRIDINE HYDROCHLORIDE 200 MG/1
200 TABLET, FILM COATED ORAL 3 TIMES DAILY PRN
Qty: 15 TABLET | Refills: 0 | Status: SHIPPED | OUTPATIENT
Start: 2023-01-31 | End: 2023-04-13 | Stop reason: SDUPTHER

## 2023-01-31 RX ORDER — PHENAZOPYRIDINE HYDROCHLORIDE 100 MG/1
200 TABLET, FILM COATED ORAL
Status: DISCONTINUED | OUTPATIENT
Start: 2023-01-31 | End: 2023-01-31 | Stop reason: HOSPADM

## 2023-01-31 RX ORDER — LEVOFLOXACIN 500 MG/1
500 TABLET, FILM COATED ORAL DAILY
Qty: 7 TABLET | Refills: 0 | Status: SHIPPED | OUTPATIENT
Start: 2023-01-31 | End: 2023-02-07

## 2023-01-31 RX ORDER — CLINDAMYCIN PHOSPHATE 900 MG/50ML
900 INJECTION, SOLUTION INTRAVENOUS
Status: COMPLETED | OUTPATIENT
Start: 2023-01-31 | End: 2023-01-31

## 2023-01-31 RX ORDER — HYOSCYAMINE SULFATE 0.12 MG/1
0.25 TABLET SUBLINGUAL EVERY 4 HOURS PRN
Qty: 40 TABLET | Refills: 0 | Status: SHIPPED | OUTPATIENT
Start: 2023-01-31 | End: 2023-02-27

## 2023-01-31 RX ORDER — PROPOFOL 10 MG/ML
VIAL (ML) INTRAVENOUS
Status: DISCONTINUED | OUTPATIENT
Start: 2023-01-31 | End: 2023-01-31

## 2023-01-31 RX ORDER — ONDANSETRON 2 MG/ML
INJECTION INTRAMUSCULAR; INTRAVENOUS
Status: DISCONTINUED | OUTPATIENT
Start: 2023-01-31 | End: 2023-01-31

## 2023-01-31 RX ORDER — ACETAMINOPHEN 10 MG/ML
1000 INJECTION, SOLUTION INTRAVENOUS ONCE
Status: DISCONTINUED | OUTPATIENT
Start: 2023-01-31 | End: 2023-01-31 | Stop reason: HOSPADM

## 2023-01-31 RX ORDER — HYDROMORPHONE HYDROCHLORIDE 2 MG/ML
0.2 INJECTION, SOLUTION INTRAMUSCULAR; INTRAVENOUS; SUBCUTANEOUS EVERY 5 MIN PRN
Status: DISCONTINUED | OUTPATIENT
Start: 2023-01-31 | End: 2023-01-31 | Stop reason: HOSPADM

## 2023-01-31 RX ORDER — ONDANSETRON 2 MG/ML
4 INJECTION INTRAMUSCULAR; INTRAVENOUS DAILY PRN
Status: DISCONTINUED | OUTPATIENT
Start: 2023-01-31 | End: 2023-01-31 | Stop reason: HOSPADM

## 2023-01-31 RX ORDER — SUCCINYLCHOLINE CHLORIDE 20 MG/ML
INJECTION INTRAMUSCULAR; INTRAVENOUS
Status: DISCONTINUED | OUTPATIENT
Start: 2023-01-31 | End: 2023-01-31

## 2023-01-31 RX ORDER — OXYCODONE AND ACETAMINOPHEN 5; 325 MG/1; MG/1
1 TABLET ORAL EVERY 4 HOURS PRN
Qty: 15 TABLET | Refills: 0 | Status: SHIPPED | OUTPATIENT
Start: 2023-01-31 | End: 2023-02-06

## 2023-01-31 RX ADMIN — SUGAMMADEX 200 MG: 100 INJECTION, SOLUTION INTRAVENOUS at 02:01

## 2023-01-31 RX ADMIN — HYDROMORPHONE HYDROCHLORIDE 0.2 MG: 2 INJECTION INTRAMUSCULAR; INTRAVENOUS; SUBCUTANEOUS at 02:01

## 2023-01-31 RX ADMIN — ROCURONIUM BROMIDE 25 MG: 10 INJECTION, SOLUTION INTRAVENOUS at 01:01

## 2023-01-31 RX ADMIN — ROCURONIUM BROMIDE 5 MG: 10 INJECTION, SOLUTION INTRAVENOUS at 01:01

## 2023-01-31 RX ADMIN — MEPERIDINE HYDROCHLORIDE 12.5 MG: 25 INJECTION INTRAMUSCULAR; INTRAVENOUS; SUBCUTANEOUS at 03:01

## 2023-01-31 RX ADMIN — MIDAZOLAM 2 MG: 1 INJECTION INTRAMUSCULAR; INTRAVENOUS at 01:01

## 2023-01-31 RX ADMIN — PHENAZOPYRIDINE 200 MG: 100 TABLET ORAL at 03:01

## 2023-01-31 RX ADMIN — LIDOCAINE HYDROCHLORIDE 100 MG: 20 INJECTION, SOLUTION EPIDURAL; INFILTRATION; INTRACAUDAL; PERINEURAL at 01:01

## 2023-01-31 RX ADMIN — ROCURONIUM BROMIDE 10 MG: 10 INJECTION, SOLUTION INTRAVENOUS at 01:01

## 2023-01-31 RX ADMIN — CLINDAMYCIN PHOSPHATE 900 MG: 18 INJECTION, SOLUTION INTRAVENOUS at 01:01

## 2023-01-31 RX ADMIN — HYOSCYAMINE SULFATE 0.12 MG: 0.12 TABLET ORAL at 03:01

## 2023-01-31 RX ADMIN — SUCCINYLCHOLINE CHLORIDE 200 MG: 20 INJECTION, SOLUTION INTRAMUSCULAR; INTRAVENOUS at 01:01

## 2023-01-31 RX ADMIN — ONDANSETRON 4 MG: 2 INJECTION, SOLUTION INTRAMUSCULAR; INTRAVENOUS at 01:01

## 2023-01-31 RX ADMIN — OXYCODONE HYDROCHLORIDE AND ACETAMINOPHEN 1 TABLET: 5; 325 TABLET ORAL at 03:01

## 2023-01-31 RX ADMIN — PROPOFOL 150 MG: 10 INJECTION, EMULSION INTRAVENOUS at 01:01

## 2023-01-31 RX ADMIN — SODIUM CHLORIDE, POTASSIUM CHLORIDE, SODIUM LACTATE AND CALCIUM CHLORIDE: 600; 310; 30; 20 INJECTION, SOLUTION INTRAVENOUS at 01:01

## 2023-01-31 NOTE — ANESTHESIA PREPROCEDURE EVALUATION
2023  Chelsie Garrett is a 62 y.o., female     *Anticipated Difficult Airway     Patient Active Problem List   Diagnosis    DM (diabetes mellitus) type II uncontrolled with eye manifestation    Hyperlipidemia associated with type 2 diabetes mellitus    Peripheral neuropathy    Morbid obesity    Tobacco abuse disorder    Hyperplastic colon polyp    Leg weakness    Insulin long-term use    Moderate nonproliferative diabetic retinopathy with macular edema associated with type 2 diabetes mellitus    Glaucoma    Trigger finger of left thumb    Left wrist tendonitis    Trigger thumb of left hand    Left hand pain    Thumb joint stiffness    Cigarette nicotine dependence without complication    Plantar fasciitis    Actinic keratosis    Type II diabetes mellitus with neurological manifestations    Pain in right foot - Right Foot    Contusion of right heel    Smoker    Proteinuria    Preop cardiovascular exam    Bladder tumor     Past Medical History:   Diagnosis Date    Asthma     CHILDHOOD    Cigarette nicotine dependence without complication 2017    Colon polyp     DM (diabetes mellitus) type II uncontrolled with eye manifestation     Hyperplastic colon polyp 2015    Hypertension     Morbid obesity 10/08/2014    The patient presents with obesity.  Denies bulimia, amenorrhea, cold intolerance, edema, hip pain, hirsutism, knee pain, polydipsia, polyuria, thirst and weakness.  The patient does not perform regular exercise.  Previous treatments for obesity :self-directed dieting with success.  The patient and I discussed the importance of exercise.    She has continued to gain weight.   Wt Readings from Last     Sleep apnea     positive test     Past Surgical History:   Procedure Laterality Date     SECTION      X2    EYE SURGERY Right     ,        BMP  (1/11/23); WNL    Lab Results   Component Value Date    WBC 7.33 06/01/2022    HGB 15.7 06/01/2022    HCT 49.2 (H) 06/01/2022    MCV 99 (H) 06/01/2022     06/01/2022             Pre-op Assessment    I have reviewed the Patient Summary Reports.    I have reviewed the NPO Status.   I have reviewed the Medications.     Review of Systems  Anesthesia Hx:  No previous Anesthesia No previous GETA History of prior surgery of interest to airway management or planning: Previous anesthesia: Spinal Denies Family Hx of Anesthesia complications.   Denies Personal Hx of Anesthesia complications.   Social:  Smoker    Hematology/Oncology:  Hematology Normal   Oncology Normal     EENT/Dental:   chronic allergic rhinitis   Cardiovascular:   Hypertension EASON ECG has been reviewed. Cards clearance ( Dr. Medrano) 1-19-23  No known cardiac disease   There are no absolute contraindications to planned surgery from a cardiac standpoint.    ECHO (1/26/22):  Nml; EF 50-55%    EKG (1/19/23):  Normal sinus rhythm   Normal ECG   When compared with ECG of 13-AUG-2019 08:37,   Criteria for Anterior infarct are no longer Present   Confirmed by MAYTE MARIA MD ([id number]) on 1/20/2023 12:00:46 PM    Pulmonary:   Asthma Denies Shortness of breath.  Denies Recent URI. Sleep Apnea    Renal/:  Renal/ Normal     Hepatic/GI:  Hepatic/GI Normal    Musculoskeletal:  Musculoskeletal Normal    Neurological:   Peripheral Neuropathy    Endocrine:   Diabetes, type 2, using insulin bmi 44 Morbid Obesity / BMI > 40      Physical Exam  General: Well nourished, Cooperative, Alert and Oriented    Airway:  Mallampati: IV   Mouth Opening: Normal  TM Distance: Normal  Tongue: Large  Neck ROM: Normal ROM  Oropharynx: Hoarseness  Neck: Girth Increased    Dental:  Intact  Patient denies any currently loose or chipped teeth; Patient denies any removable dental appliances      Anesthesia Plan  Type of Anesthesia, risks & benefits discussed:    Anesthesia Type: Gen  ETT  Intra-op Monitoring Plan: Standard ASA Monitors  Post Op Pain Control Plan: multimodal analgesia and IV/PO Opioids PRN  Induction:  IV  Airway Plan: Video  Informed Consent: Informed consent signed with the Patient and all parties understand the risks and agree with anesthesia plan.  All questions answered.   ASA Score: 3  Day of Surgery Review of History & Physical: H&P Update referred to the surgeon/provider.    Ready For Surgery From Anesthesia Perspective.     .

## 2023-01-31 NOTE — DISCHARGE SUMMARY
[facility] - Surgery (Hospital)  Discharge Note  Short Stay    Procedure(s) (LRB):  TURBT (TRANSURETHRAL RESECTION OF BLADDER TUMOR) (N/A)  CYSTOSCOPY, WITH RETROGRADE PYELOGRAM AND URETERAL STENT INSERTION (Bilateral)      OUTCOME: Patient tolerated treatment/procedure well without complication and is now ready for discharge.    DISPOSITION: Home or Self Care    FINAL DIAGNOSIS:  <principal problem not specified>    FOLLOWUP: In clinic    DISCHARGE INSTRUCTIONS:    Discharge Procedure Orders   Diet Adult Regular     Notify your health care provider if you experience any of the following:  severe uncontrolled pain     Notify your health care provider if you experience any of the following:  persistent nausea and vomiting or diarrhea     Notify your health care provider if you experience any of the following:  temperature >100.4     Activity as tolerated        TIME SPENT ON DISCHARGE: 5 minutes

## 2023-01-31 NOTE — ANESTHESIA PROCEDURE NOTES
Intubation    Date/Time: 1/31/2023 1:41 PM  Performed by: Randa Trinh CRNA  Authorized by: Bryn Schmitz MD     Intubation:     Induction:  Intravenous    Intubated:  Postinduction    Mask Ventilation:  Easy mask    Attempts:  1    Attempted By:  CRNA    Method of Intubation:  Video laryngoscopy    Blade:  Thompson 3    Laryngeal View Grade: Grade I - full view of cords      Difficult Airway Encountered?: No      Complications:  None    Airway Device:  Oral endotracheal tube    Airway Device Size:  7.5    Style/Cuff Inflation:  Cuffed (inflated to minimal occlusive pressure)    Tube secured:  22    Secured at:  The lips    Placement Verified By:  Capnometry    Complicating Factors:  None    Findings Post-Intubation:  BS equal bilateral and atraumatic/condition of teeth unchanged

## 2023-01-31 NOTE — TRANSFER OF CARE
"Anesthesia Transfer of Care Note    Patient: Chelsie Garrett    Procedure(s) Performed: Procedure(s) (LRB):  TURBT (TRANSURETHRAL RESECTION OF BLADDER TUMOR) (N/A)  CYSTOSCOPY, WITH RETROGRADE PYELOGRAM AND URETERAL STENT INSERTION (Bilateral)    Patient location: PACU    Anesthesia Type: general    Transport from OR: Transported from OR on room air with adequate spontaneous ventilation    Post pain: adequate analgesia    Post assessment: no apparent anesthetic complications    Post vital signs: stable    Level of consciousness: awake    Nausea/Vomiting: no nausea/vomiting    Complications: none    Transfer of care protocol was followed      Last vitals:   Visit Vitals  BP (!) 145/64 (BP Location: Right arm, Patient Position: Sitting)   Pulse 72   Temp 36.5 °C (97.7 °F) (Temporal)   Resp 20   Ht 5' 9" (1.753 m)   Wt 136 kg (299 lb 13.2 oz)   SpO2 99%   Breastfeeding No   BMI 44.28 kg/m²     "

## 2023-01-31 NOTE — OP NOTE
Date of Procedure: 01/31/2023    PREOP DIAGNOSIS:  Bladder cancer.    POSTOP DIAGNOSIS:  Bladder cancer.    PROCEDURES:      1. TURBT -- medium.    2. Bilateral Retrogrades    3. Tumor fulguration    4. Cystoscopy with stent placement    SURGEON:  Marlon Clark M.D.    Assistants: None    Specimen: Bladder tumor    ANESTHESIA:  General endotracheal.    BLOOD LOSS:  None.    FINDINGS:  large tumor identified surrounding the right ureteral orifice, completely resected, right 7 Chilean by 20 cm double-J ureteral stent in good position, bilateral retrogrades were unremarkable.      PROCEDURE IN DETAIL:  Patient was brought to the operative suite and placed under general anesthesia and positioned into the dorsal lithotomy position.  After being sterilely prepped and draped a 21 Chilean sheath cystoscope was inserted into a normal urethra.  Bladder was examined, tumor was identified surrounding the right ureteral orifice, approximately 2.5 cm.  Bilateral ureteral orifices are normal in size, shape, caliber and location.  Left ureteral orifice was cannulated with a [place] catheter, contrast was injected.  There was no evidence of filling defects or other abnormalities, otherwise unremarkable left retrograde nephrogram.  [place] was then inserted into the right ureteral orifice.  Contrast was injected demonstrating no evidence of filling defects or other abnormalities, otherwise unremarkable right retrograde nephrogram.  Due to the close proximity of the tumor surrounding this orifice, the [place] catheter was left in place to assist with resection.  Cystoscope was removed, 24 Chilean resectoscope was then inserted into the urethra and bladder.  Dissection was continued until the tumor was completely dissected from the bladder wall.  I then fulgurated the edges and the base of the tumor.  No evidence of tumor remaining at the conclusion of resection.  Hemostasis was meticulously maintained and there was no evidence of  residual tumor burden within the bladder.  Wire was inserted into the Bovey catheter and advanced into the renal pelvis under fluoroscopic imaging.  Wire was then removed contrast was used to inject and demonstrates good positioning within the renal pelvis.  Wire was reinserted and the Bovey catheter was removed.  Then over the wire using Seldinger technique we placed a 7 Martiniquais by 26 cm double-J ureteral stent with good coil in the renal pelvis and the bladder.  Cystoscope was then used to drain the bladder.  Patient was transferred to the PACU in stable condition.  Patient will return in 3 weeks to discuss pathology and have the stent removed with a cystoscope.    COMPLICATIONS: None

## 2023-02-01 ENCOUNTER — PATIENT MESSAGE (OUTPATIENT)
Dept: UROLOGY | Facility: CLINIC | Age: 63
End: 2023-02-01
Payer: COMMERCIAL

## 2023-02-01 VITALS
OXYGEN SATURATION: 97 % | HEART RATE: 66 BPM | SYSTOLIC BLOOD PRESSURE: 144 MMHG | TEMPERATURE: 98 F | HEIGHT: 69 IN | BODY MASS INDEX: 43.4 KG/M2 | RESPIRATION RATE: 14 BRPM | WEIGHT: 293 LBS | DIASTOLIC BLOOD PRESSURE: 64 MMHG

## 2023-02-01 NOTE — ANESTHESIA POSTPROCEDURE EVALUATION
Anesthesia Post Evaluation    Patient: Chelsie Garrett    Procedure(s) Performed: Procedure(s) (LRB):  TURBT (TRANSURETHRAL RESECTION OF BLADDER TUMOR) (N/A)  CYSTOSCOPY, WITH RETROGRADE PYELOGRAM AND URETERAL STENT INSERTION (Bilateral)    Final Anesthesia Type: general      Patient location during evaluation: PACU  Patient participation: Yes- Able to Participate  Level of consciousness: awake  Post-procedure vital signs: reviewed and stable  Pain management: adequate  Airway patency: patent    PONV status at discharge: No PONV  Anesthetic complications: no      Cardiovascular status: hemodynamically stable  Respiratory status: unassisted  Hydration status: euvolemic  Follow-up not needed.          Vitals Value Taken Time   /65 01/31/23 1559   Temp 36.6 °C (97.9 °F) 01/31/23 1430   Pulse 68 01/31/23 1603   Resp 14 01/31/23 1603   SpO2 86 % 01/31/23 1603   Vitals shown include unvalidated device data.      Event Time   Out of Recovery 16:05:13         Pain/Alissa Score: Pain Rating Prior to Med Admin: 8 (1/31/2023  3:40 PM)  Alissa Score: 10 (1/31/2023  3:45 PM)

## 2023-02-06 ENCOUNTER — OFFICE VISIT (OUTPATIENT)
Dept: PULMONOLOGY | Facility: CLINIC | Age: 63
End: 2023-02-06
Payer: COMMERCIAL

## 2023-02-06 ENCOUNTER — TELEPHONE (OUTPATIENT)
Dept: SLEEP MEDICINE | Facility: CLINIC | Age: 63
End: 2023-02-06
Payer: COMMERCIAL

## 2023-02-06 VITALS
HEART RATE: 78 BPM | SYSTOLIC BLOOD PRESSURE: 124 MMHG | OXYGEN SATURATION: 96 % | BODY MASS INDEX: 43.4 KG/M2 | DIASTOLIC BLOOD PRESSURE: 62 MMHG | RESPIRATION RATE: 14 BRPM | WEIGHT: 293 LBS | HEIGHT: 69 IN

## 2023-02-06 DIAGNOSIS — D49.4 BLADDER TUMOR: ICD-10-CM

## 2023-02-06 DIAGNOSIS — G47.33 OBSTRUCTIVE SLEEP APNEA: ICD-10-CM

## 2023-02-06 DIAGNOSIS — G47.30 SLEEP APNEA, UNSPECIFIED TYPE: ICD-10-CM

## 2023-02-06 DIAGNOSIS — Z72.0 TOBACCO ABUSE DISORDER: Chronic | ICD-10-CM

## 2023-02-06 DIAGNOSIS — R06.09 DYSPNEA ON EXERTION: Primary | ICD-10-CM

## 2023-02-06 PROCEDURE — 1160F PR REVIEW ALL MEDS BY PRESCRIBER/CLIN PHARMACIST DOCUMENTED: ICD-10-PCS | Mod: CPTII,S$GLB,, | Performed by: HOSPITALIST

## 2023-02-06 PROCEDURE — 99999 PR PBB SHADOW E&M-EST. PATIENT-LVL V: ICD-10-PCS | Mod: PBBFAC,,, | Performed by: HOSPITALIST

## 2023-02-06 PROCEDURE — 1159F MED LIST DOCD IN RCRD: CPT | Mod: CPTII,S$GLB,, | Performed by: HOSPITALIST

## 2023-02-06 PROCEDURE — 99204 OFFICE O/P NEW MOD 45 MIN: CPT | Mod: S$GLB,,, | Performed by: HOSPITALIST

## 2023-02-06 PROCEDURE — 1159F PR MEDICATION LIST DOCUMENTED IN MEDICAL RECORD: ICD-10-PCS | Mod: CPTII,S$GLB,, | Performed by: HOSPITALIST

## 2023-02-06 PROCEDURE — 3078F PR MOST RECENT DIASTOLIC BLOOD PRESSURE < 80 MM HG: ICD-10-PCS | Mod: CPTII,S$GLB,, | Performed by: HOSPITALIST

## 2023-02-06 PROCEDURE — 1160F RVW MEDS BY RX/DR IN RCRD: CPT | Mod: CPTII,S$GLB,, | Performed by: HOSPITALIST

## 2023-02-06 PROCEDURE — 3008F BODY MASS INDEX DOCD: CPT | Mod: CPTII,S$GLB,, | Performed by: HOSPITALIST

## 2023-02-06 PROCEDURE — 3074F SYST BP LT 130 MM HG: CPT | Mod: CPTII,S$GLB,, | Performed by: HOSPITALIST

## 2023-02-06 PROCEDURE — 3078F DIAST BP <80 MM HG: CPT | Mod: CPTII,S$GLB,, | Performed by: HOSPITALIST

## 2023-02-06 PROCEDURE — 99999 PR PBB SHADOW E&M-EST. PATIENT-LVL V: CPT | Mod: PBBFAC,,, | Performed by: HOSPITALIST

## 2023-02-06 PROCEDURE — 3008F PR BODY MASS INDEX (BMI) DOCUMENTED: ICD-10-PCS | Mod: CPTII,S$GLB,, | Performed by: HOSPITALIST

## 2023-02-06 PROCEDURE — 99204 PR OFFICE/OUTPT VISIT, NEW, LEVL IV, 45-59 MIN: ICD-10-PCS | Mod: S$GLB,,, | Performed by: HOSPITALIST

## 2023-02-06 PROCEDURE — 3074F PR MOST RECENT SYSTOLIC BLOOD PRESSURE < 130 MM HG: ICD-10-PCS | Mod: CPTII,S$GLB,, | Performed by: HOSPITALIST

## 2023-02-06 RX ORDER — VARENICLINE TARTRATE 0.5 (11)-1
KIT ORAL
Qty: 1 EACH | Refills: 0 | Status: SHIPPED | OUTPATIENT
Start: 2023-02-06 | End: 2023-04-11 | Stop reason: SDUPTHER

## 2023-02-06 NOTE — ASSESSMENT & PLAN NOTE
- prior HST 6/2021 with AHI 20.6, 23 minutes with SpO2 between 70-79%, snoring 98% time above , patient lost to follow up  - HST ordered  - longterm complications of untreated DELANEY discussed with patient

## 2023-02-06 NOTE — PROGRESS NOTES
Subjective:       Patient ID: Chelsie Garrett is a 62 y.o. female.    Chief Complaint: No chief complaint on file.    62 year old female with history of bladder cancer (s/p resection 1/31/2023, path pending), HTN, HLD, obesity, IDDM2, tobacco user who presents to Pulmonary clinic for evaluation of sleep apnea. Records reviewed, had a home sleep study 6/2021 notable for AHI 20.6, 23 minutes with SpO2 between 70-79%, snoring 98% time above , patient was then lost to follow up. LDCT ordered by PCP, no prior in system, did have an abd/pelvis in care everywhere that noted a 5mm left lower lobe nodule (image not available) 8/2022.    6-8 cigarettes per day, trying to quit but having difficulty with triggers  Dyspnea with exertion, bending down  Coughing with post-nasal drip at night  Denies wheezing, hemoptysis.     BP Readings from Last 3 Encounters:   01/31/23 (!) 144/64   01/27/23 130/81   01/19/23 132/68     Snoring / Sleep:   Difficulty sleeping, wakes every hours    Sleeps on her side    Time Awake: 0730  Time Asleep: 0973-4284      Dwarf Questionnaire (validated DELANEY screening questionnaire)    Yes -- Snoring/apnea    Yes -- Fatigue    There is no height or weight on file to calculate BMI.  (>25 is overweight, >30 is obese)    Blood Pressure = Hypertension  (PreHTN 120-139/80-89, Stg1 140-159/90-99, Stg2 >160/>100)  Dwarf = Three of three DELANEY categories are positive (high risk is 2-3 positive categories)     Stowell Sleepiness Scale TOTAL =  2  (validated sleepiness questionnaire with a higher score indicating greater sleepiness; range 0-24)      STOP-Bang Questionnaire (validated DELANEY screening questionnaire)  Negative unless checked off.  [x] Snoring    [x]  Tired/Fatigued/Sleepy  [x] Obstruction (apneas/choking)  [x] Pressure (HTN)  [x] BMI >35  [x] Age >50  [] Neck >40 cm  [] Gender male   STOP-Bang = 6 (low risk 0-2,high risk 3-8)    Neck circumference 17.5in [?EDLANEY risk if >43cm (17in) male or >41cm (15.5  in) female]  Sleep position Supine = occasional    Non-supine = frequent  Mouth breathing during sleep - frequent               Review of Systems   HENT:  Positive for postnasal drip.    Respiratory:  Positive for snoring, cough and dyspnea on extertion.      Objective:      Physical Exam   Constitutional: She is oriented to person, place, and time. She appears well-developed and well-nourished. She is obese.   Cardiovascular: Normal rate and regular rhythm.   Pulmonary/Chest: Normal expansion, effort normal and breath sounds normal.   Musculoskeletal:         General: No edema.   Neurological: She is alert and oriented to person, place, and time.   Psychiatric: She has a normal mood and affect.     No flowsheet data found.      Assessment:       No diagnosis found.    Outpatient Encounter Medications as of 2/6/2023   Medication Sig Dispense Refill    atorvastatin (LIPITOR) 80 MG tablet TAKE 1 TABLET BY MOUTH ONCE DAILY (Patient taking differently: Take 80 mg by mouth every evening.) 90 tablet 3    azelastine (ASTELIN) 137 mcg (0.1 %) nasal spray 2 sprays (274 mcg total) by Nasal route 2 (two) times daily. 30 mL 11    benzonatate (TESSALON) 200 MG capsule Take 1 capsule (200 mg total) by mouth 3 (three) times daily as needed for Cough. 30 capsule 0    blood-glucose sensor (DEXCOM G6 SENSOR) Hannah Use as directed for continuous glucose monitoring. Change sensor every 10 days.      bromocriptine (CYCLOSET) 0.8 mg Tab Take 0.8 mg by mouth every evening.      DEXCOM G6 SENSOR Hannah SMARTSIG:Topical Every 10 Days      DEXCOM G6 TRANSMITTER Hannah Use as directed to check glucose. Change every 90 days.      ezetimibe (ZETIA) 10 mg tablet TAKE 1 TABLET BY MOUTH ONCE DAILY (Patient taking differently: Take 10 mg by mouth every evening.) 90 tablet 3    glucagon (BAQSIMI) 3 mg/actuation Spry SPRAY 1 SPRAY in one nostril FOR 1 dose      hyoscyamine (LEVSIN/SL) 0.125 mg Subl Place 2 tablets (0.25 mg total) under the tongue every 4  (four) hours as needed (spasms). 40 tablet 0    insulin aspart U-100 (NOVOLOG FLEXPEN U-100 INSULIN) 100 unit/mL (3 mL) InPn pen THREE TIMES DAILY with meals per SLIDING SCALE 45 mL 0    LANTUS SOLOSTAR U-100 INSULIN glargine 100 units/mL (3mL) SubQ pen INJECT 90 UNITS SUBCUTANEOUSLY EVERY MORNING 75 mL 1    levoFLOXacin (LEVAQUIN) 500 MG tablet Take 1 tablet (500 mg total) by mouth once daily. for 7 days 7 tablet 0    lisinopriL (PRINIVIL,ZESTRIL) 2.5 MG tablet TAKE 1 TABLET BY MOUTH ONCE DAILY (Patient taking differently: Take 2.5 mg by mouth every evening.) 90 tablet 3    multivitamin capsule Take 1 capsule by mouth once daily.      oxyCODONE-acetaminophen (PERCOCET) 5-325 mg per tablet Take 1 tablet by mouth every 4 (four) hours as needed for Pain. 15 tablet 0    phenazopyridine (PYRIDIUM) 200 MG tablet Take 1 tablet (200 mg total) by mouth 3 (three) times daily as needed (burning). 15 tablet 0    timolol maleate 0.5% (TIMOPTIC) 0.5 % Drop Place 1 drop into both eyes 2 (two) times daily.      TRULICITY 1.5 mg/0.5 mL pen injector INJECT 1.5 MG INTO THE SKIN EVERY 7 DAYS (Patient taking differently: Inject 1.5 mg into the skin every 7 days. Sunday) 6 mL 1     No facility-administered encounter medications on file as of 2/6/2023.     No orders of the defined types were placed in this encounter.        Plan:       Problem List Items Addressed This Visit          Oncology    Bladder tumor     - s/p resection 1/2023, path pending            Other    Tobacco abuse disorder (Chronic)     - LDCT ordered by PCP  - CT abd/pelvis 8/2022 with 5mm left lower lobe nodule- discussed finding with patient and need for full chest imaging, to be scheduled  - patient trying to quit, requested 1 month of Chantix  - no prior pulmonary testing, does report night time cough with post-nasal drip, dyspnea on exertion  - PFT, 6MWD on follow up           Relevant Medications    varenicline (CHANTIX STARTING MONTH BOX) 0.5 mg (11)- 1 mg (42)  tablet    Other Relevant Orders    Complete PFT w/ bronchodilator    Stress test, pulmonary    Obstructive sleep apnea     - prior HST 6/2021 with AHI 20.6, 23 minutes with SpO2 between 70-79%, snoring 98% time above , patient lost to follow up  - HST ordered  - longterm complications of untreated DELANEY discussed with patient            Other Visit Diagnoses       Dyspnea on exertion    -  Primary    Relevant Orders    Complete PFT w/ bronchodilator    Sleep apnea, unspecified type        Relevant Orders    Home Sleep Study          Plan discussed with patient who expressed understanding, all questions answered.     Follow up in one month for HST, PFT, 6MWT, LDCT review.

## 2023-02-06 NOTE — ASSESSMENT & PLAN NOTE
- LDCT ordered by PCP  - CT abd/pelvis 8/2022 with 5mm left lower lobe nodule- discussed finding with patient and need for full chest imaging, to be scheduled  - patient trying to quit, requested 1 month of Chantix  - no prior pulmonary testing, does report night time cough with post-nasal drip, dyspnea on exertion  - PFT, 6MWD on follow up

## 2023-02-07 ENCOUNTER — PATIENT MESSAGE (OUTPATIENT)
Dept: PULMONOLOGY | Facility: CLINIC | Age: 63
End: 2023-02-07
Payer: COMMERCIAL

## 2023-02-07 ENCOUNTER — OFFICE VISIT (OUTPATIENT)
Dept: PODIATRY | Facility: CLINIC | Age: 63
End: 2023-02-07
Payer: COMMERCIAL

## 2023-02-07 VITALS
DIASTOLIC BLOOD PRESSURE: 89 MMHG | HEIGHT: 69 IN | WEIGHT: 293 LBS | BODY MASS INDEX: 43.4 KG/M2 | SYSTOLIC BLOOD PRESSURE: 124 MMHG | HEART RATE: 77 BPM

## 2023-02-07 DIAGNOSIS — L84 CORN OR CALLUS: ICD-10-CM

## 2023-02-07 DIAGNOSIS — E11.9 ENCOUNTER FOR COMPREHENSIVE DIABETIC FOOT EXAMINATION, TYPE 2 DIABETES MELLITUS: Primary | ICD-10-CM

## 2023-02-07 DIAGNOSIS — E11.49 TYPE II DIABETES MELLITUS WITH NEUROLOGICAL MANIFESTATIONS: ICD-10-CM

## 2023-02-07 PROCEDURE — 3074F PR MOST RECENT SYSTOLIC BLOOD PRESSURE < 130 MM HG: ICD-10-PCS | Mod: CPTII,S$GLB,, | Performed by: PODIATRIST

## 2023-02-07 PROCEDURE — 1160F PR REVIEW ALL MEDS BY PRESCRIBER/CLIN PHARMACIST DOCUMENTED: ICD-10-PCS | Mod: CPTII,S$GLB,, | Performed by: PODIATRIST

## 2023-02-07 PROCEDURE — 3008F PR BODY MASS INDEX (BMI) DOCUMENTED: ICD-10-PCS | Mod: CPTII,S$GLB,, | Performed by: PODIATRIST

## 2023-02-07 PROCEDURE — 1160F RVW MEDS BY RX/DR IN RCRD: CPT | Mod: CPTII,S$GLB,, | Performed by: PODIATRIST

## 2023-02-07 PROCEDURE — 99203 OFFICE O/P NEW LOW 30 MIN: CPT | Mod: 25,S$GLB,, | Performed by: PODIATRIST

## 2023-02-07 PROCEDURE — 3008F BODY MASS INDEX DOCD: CPT | Mod: CPTII,S$GLB,, | Performed by: PODIATRIST

## 2023-02-07 PROCEDURE — 99999 PR PBB SHADOW E&M-EST. PATIENT-LVL V: ICD-10-PCS | Mod: PBBFAC,,, | Performed by: PODIATRIST

## 2023-02-07 PROCEDURE — 99999 PR PBB SHADOW E&M-EST. PATIENT-LVL V: CPT | Mod: PBBFAC,,, | Performed by: PODIATRIST

## 2023-02-07 PROCEDURE — 4010F PR ACE/ARB THEARPY RXD/TAKEN: ICD-10-PCS | Mod: CPTII,S$GLB,, | Performed by: PODIATRIST

## 2023-02-07 PROCEDURE — 1159F MED LIST DOCD IN RCRD: CPT | Mod: CPTII,S$GLB,, | Performed by: PODIATRIST

## 2023-02-07 PROCEDURE — 1159F PR MEDICATION LIST DOCUMENTED IN MEDICAL RECORD: ICD-10-PCS | Mod: CPTII,S$GLB,, | Performed by: PODIATRIST

## 2023-02-07 PROCEDURE — 99203 PR OFFICE/OUTPT VISIT, NEW, LEVL III, 30-44 MIN: ICD-10-PCS | Mod: 25,S$GLB,, | Performed by: PODIATRIST

## 2023-02-07 PROCEDURE — 3074F SYST BP LT 130 MM HG: CPT | Mod: CPTII,S$GLB,, | Performed by: PODIATRIST

## 2023-02-07 PROCEDURE — 3079F DIAST BP 80-89 MM HG: CPT | Mod: CPTII,S$GLB,, | Performed by: PODIATRIST

## 2023-02-07 PROCEDURE — 4010F ACE/ARB THERAPY RXD/TAKEN: CPT | Mod: CPTII,S$GLB,, | Performed by: PODIATRIST

## 2023-02-07 PROCEDURE — 3079F PR MOST RECENT DIASTOLIC BLOOD PRESSURE 80-89 MM HG: ICD-10-PCS | Mod: CPTII,S$GLB,, | Performed by: PODIATRIST

## 2023-02-07 NOTE — PROGRESS NOTES
Subjective:     Patient ID: Chelsie Garrett is a 62 y.o. female.    Chief Complaint: Diabetic Foot Exam (C/o painful right foot callus, rates pain 1/10, wears casual shoes, Diabetic Pt, Last seen on 01/17/23 with Lucina Barker NP)    Chelsie is a 62 y.o. female who presents to the clinic for evaluation and treatment of high risk feet. Chelsie has a past medical history of Asthma, Cigarette nicotine dependence without complication (09/19/2017), Colon polyp, DM (diabetes mellitus) type II uncontrolled with eye manifestation, Hyperplastic colon polyp (02/05/2015), Hypertension, Morbid obesity (10/08/2014), and Sleep apnea. The patient's chief complaint is painful right foot callus. Patient rates pain 1/10. This patient has documented high risk feet requiring routine maintenance secondary to diabetes mellitis and those secondary complications of diabetes, as mentioned..    PCP: Yasir Emmanuel MD  referring provider  Date Last Seen by PCP: 01/17/2023    Current shoe gear:  Affected Foot: Casual shoes     Unaffected Foot: Casual shoes    Hemoglobin A1C   Date Value Ref Range Status   06/01/2022 10.0 (H) 4.0 - 5.6 % Final     Comment:     ADA Screening Guidelines:  5.7-6.4%  Consistent with prediabetes  >or=6.5%  Consistent with diabetes    High levels of fetal hemoglobin interfere with the HbA1C  assay. Heterozygous hemoglobin variants (HbS, HgC, etc)do  not significantly interfere with this assay.   However, presence of multiple variants may affect accuracy.     06/15/2021 10.0 (H) 4.0 - 5.6 % Final     Comment:     ADA Screening Guidelines:  5.7-6.4%  Consistent with prediabetes  >or=6.5%  Consistent with diabetes    High levels of fetal hemoglobin interfere with the HbA1C  assay. Heterozygous hemoglobin variants (HbS, HgC, etc)do  not significantly interfere with this assay.   However, presence of multiple variants may affect accuracy.     12/11/2020 9.5 (H) 4.0 - 5.6 % Final     Comment:     ADA Screening  Guidelines:  5.7-6.4%  Consistent with prediabetes  >or=6.5%  Consistent with diabetes  High levels of fetal hemoglobin interfere with the HbA1C  assay. Heterozygous hemoglobin variants (HbS, HgC, etc)do  not significantly interfere with this assay.   However, presence of multiple variants may affect accuracy.         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 associated with type 2 diabetes mellitus    Glaucoma    Trigger finger of left thumb    Left wrist tendonitis    Trigger thumb of left hand    Left hand pain    Thumb joint stiffness    Cigarette nicotine dependence without complication    Plantar fasciitis    Actinic keratosis    Type II diabetes mellitus with neurological manifestations    Pain in right foot - Right Foot    Contusion of right heel    Smoker    Proteinuria    Preop cardiovascular exam    Bladder tumor    Obstructive sleep apnea       Medication List with Changes/Refills   Current Medications    AZELASTINE (ASTELIN) 137 MCG (0.1 %) NASAL SPRAY    2 sprays (274 mcg total) by Nasal route 2 (two) times daily.    BENZONATATE (TESSALON) 200 MG CAPSULE    Take 1 capsule (200 mg total) by mouth 3 (three) times daily as needed for Cough.    BLOOD-GLUCOSE SENSOR (DEXCOM G6 SENSOR) ANDRÉS    Use as directed for continuous glucose monitoring. Change sensor every 10 days.    BROMOCRIPTINE (CYCLOSET) 0.8 MG TAB    Take 0.8 mg by mouth every evening. Take 3 pills every evening    DEXCOM G6 SENSOR ANDRÉS    SMARTSIG:Topical Every 10 Days    DEXCOM G6 TRANSMITTER ANDRÉS    Use as directed to check glucose. Change every 90 days.    GLUCAGON (BAQSIMI) 3 MG/ACTUATION SPRY    SPRAY 1 SPRAY in  one nostril FOR 1 dose    HYOSCYAMINE (LEVSIN/SL) 0.125 MG SUBL    Place 2 tablets (0.25 mg total) under the tongue every 4 (four) hours as needed (spasms).    INSULIN ASPART U-100 (NOVOLOG FLEXPEN U-100 INSULIN) 100 UNIT/ML (3 ML) INPN PEN    THREE TIMES DAILY with meals per SLIDING SCALE    LANTUS SOLOSTAR U-100 INSULIN GLARGINE 100 UNITS/ML (3ML) SUBQ PEN    INJECT 90 UNITS SUBCUTANEOUSLY EVERY MORNING    MULTIVITAMIN CAPSULE    Take 1 capsule by mouth once daily.    PHENAZOPYRIDINE (PYRIDIUM) 200 MG TABLET    Take 1 tablet (200 mg total) by mouth 3 (three) times daily as needed (burning).    TIMOLOL MALEATE 0.5% (TIMOPTIC) 0.5 % DROP    Place 1 drop into both eyes 2 (two) times daily.    TRULICITY 1.5 MG/0.5 ML PEN INJECTOR    INJECT 1.5 MG INTO THE SKIN EVERY 7 DAYS    VARENICLINE (CHANTIX STARTING MONTH BOX) 0.5 MG (11)- 1 MG (42) TABLET    Take one 0.5mg tab by mouth once daily X3 days,then increase to one 0.5mg tab twice daily X4 days,then increase to one 1mg tab twice daily   Changed and/or Refilled Medications    Modified Medication Previous Medication    ATORVASTATIN (LIPITOR) 80 MG TABLET atorvastatin (LIPITOR) 80 MG tablet       Take 1 tablet (80 mg total) by mouth every evening.    TAKE 1 TABLET BY MOUTH ONCE DAILY    EZETIMIBE (ZETIA) 10 MG TABLET ezetimibe (ZETIA) 10 mg tablet       Take 1 tablet (10 mg total) by mouth every evening.    TAKE 1 TABLET BY MOUTH ONCE DAILY    LISINOPRIL (PRINIVIL,ZESTRIL) 2.5 MG TABLET lisinopriL (PRINIVIL,ZESTRIL) 2.5 MG tablet       Take 1 tablet (2.5 mg total) by mouth every evening.    TAKE 1 TABLET BY MOUTH ONCE DAILY       Review of patient's allergies indicates:   Allergen Reactions    Penicillins Anaphylaxis       Past Surgical History:   Procedure Laterality Date     SECTION      X2    CYSTOSCOPY WITH URETEROSCOPY, RETROGRADE PYELOGRAPHY, AND INSERTION OF STENT Bilateral 2023    Procedure: CYSTOSCOPY, WITH RETROGRADE PYELOGRAM AND URETERAL STENT  "INSERTION;  Surgeon: Marlon Clark MD;  Location: [facility] OR;  Service: Urology;  Laterality: Bilateral;  Stent to right only    EYE SURGERY Right     2013, 2014    TURBT (TRANSURETHRAL RESECTION OF BLADDER TUMOR) N/A 1/31/2023    Procedure: TURBT (TRANSURETHRAL RESECTION OF BLADDER TUMOR);  Surgeon: Marlon Clark MD;  Location: [facility] OR;  Service: Urology;  Laterality: N/A;       Family History   Problem Relation Age of Onset    Pancreatic cancer Mother     Stroke Father     Coronary artery disease Father     Diabetes Father     Hypertension Father     Lung cancer Father     Bladder Cancer Paternal Uncle     Lymphoma Paternal Uncle        Social History     Socioeconomic History    Marital status:    Occupational History     Employer: [employer]   Tobacco Use    Smoking status: Every Day     Packs/day: 0.75     Years: 37.00     Pack years: 27.75     Types: Cigarettes    Smokeless tobacco: Never    Tobacco comments:     HOLD MIDNIGHT PRIOR TO SURGERY   Substance and Sexual Activity    Alcohol use: Never    Drug use: No       Vitals:    02/07/23 0900   BP: 124/89   Pulse: 77   Weight: (!) 138.8 kg (306 lb)   Height: 5' 9" (1.753 m)   PainSc: 0-No pain   PainLoc: Foot       Hemoglobin A1C   Date Value Ref Range Status   06/01/2022 10.0 (H) 4.0 - 5.6 % Final     Comment:     ADA Screening Guidelines:  5.7-6.4%  Consistent with prediabetes  >or=6.5%  Consistent with diabetes    High levels of fetal hemoglobin interfere with the HbA1C  assay. Heterozygous hemoglobin variants (HbS, HgC, etc)do  not significantly interfere with this assay.   However, presence of multiple variants may affect accuracy.     06/15/2021 10.0 (H) 4.0 - 5.6 % Final     Comment:     ADA Screening Guidelines:  5.7-6.4%  Consistent with prediabetes  >or=6.5%  Consistent with diabetes    High levels of fetal hemoglobin interfere with the HbA1C  assay. Heterozygous hemoglobin variants (HbS, HgC, etc)do  not " significantly interfere with this assay.   However, presence of multiple variants may affect accuracy.     12/11/2020 9.5 (H) 4.0 - 5.6 % Final     Comment:     ADA Screening Guidelines:  5.7-6.4%  Consistent with prediabetes  >or=6.5%  Consistent with diabetes  High levels of fetal hemoglobin interfere with the HbA1C  assay. Heterozygous hemoglobin variants (HbS, HgC, etc)do  not significantly interfere with this assay.   However, presence of multiple variants may affect accuracy.         Review of Systems   Constitutional:  Negative for chills and fever.   Respiratory:  Negative for shortness of breath.    Cardiovascular:  Negative for chest pain, palpitations, orthopnea, claudication and leg swelling.   Gastrointestinal:  Negative for diarrhea, nausea and vomiting.   Musculoskeletal:  Negative for joint pain.   Skin:  Negative for rash.   Neurological:  Negative for dizziness, tingling, sensory change, focal weakness and weakness.   Psychiatric/Behavioral: Negative.             Objective:      PHYSICAL EXAM: Apperance: Alert and orient in no distress,well developed, and with good attention to grooming and body habits  Patient presents ambulating in slip on shoes.   LOWER EXTREMITY EXAM:  VASCULAR: Dorsalis pedis pulses 2/4 bilateral and Posterior Tibial pulses 1/4 bilateral. Capillary fill time <4 seconds bilateral. No edema observed bilateral. Varicosities absent bilateral. Skin temperature of the lower extremities is warm to warm, proximal to distal. Hair growth WNL bilateral.  DERMATOLOGICAL: No skin rashes, subcutaneous nodules, lesions, or ulcers observed bilateral. Nails 1,2,3,4,5 bilateral normal length. Webspaces 1,2,3,4 bilateral clean, dry and without evidence of break in skin integrity. Minimal hyperkeratotic tissue noted to bilateral plantar 5th submetatarsal and bilateral plantar/medial hallux.   NEUROLOGICAL: Light touch, sharp-dull, proprioception all present and equal bilaterally.  Vibratory  sensation intact at bilateral hallux. Protective sensation intact at all 10 sites as tested with a Melrose-Burton 5.07 monofilament.   MUSCULOSKELETAL: Muscle strength is 5/5 for foot inverters, everters, plantarflexors, and dorsiflexors. Muscle tone is normal.         Assessment:       ICD-10-CM ICD-9-CM   1. Encounter for comprehensive diabetic foot examination, type 2 diabetes mellitus  E11.9 250.00   2. Type II diabetes mellitus with neurological manifestations  E11.49 250.60   3. Corn or callus  L84 700       Plan:   Encounter for comprehensive diabetic foot examination, type 2 diabetes mellitus    Type II diabetes mellitus with neurological manifestations  -     Ambulatory referral/consult to Podiatry    Corn or callus      I counseled the patient on her conditions, regarding findings of my examination, my impressions, and usual treatment plan.   Greater than 50% of this visit spent on counseling and coordination of care.  Greater than 12 minutes of a 15 minute appointment spent on education about the diabetic foot, neuropathy, and prevention of limb loss.  Shoe inspection. Diabetic Foot Education. Patient reminded of the importance of good nutrition and blood sugar control to help prevent podiatric complications of diabetes. Patient instructed on proper foot hygeine. We discussed wearing proper shoe gear, daily foot inspections, never walking without protective shoe gear, never putting sharp instruments to feet.    Patient instructed to regularly file area to prevent build up. Patient also instructed to keep area moisturized.   Patient  will continue to monitor the areas daily, inspect feet, wear protective shoe gear when ambulatory, moisturizer to maintain skin integrity. Patient reminded of the importance of good nutrition and blood sugar control to help prevent podiatric complications of diabetes.  Patient to return 12 months or sooner if needed.              Joe Rivera DPM  [facility]

## 2023-02-07 NOTE — TELEPHONE ENCOUNTER
----- Message from Osiel Lara sent at 2/6/2023  1:39 PM CST -----  [facility]

## 2023-02-08 LAB
FINAL PATHOLOGIC DIAGNOSIS: NORMAL
Lab: NORMAL

## 2023-02-17 ENCOUNTER — TELEPHONE (OUTPATIENT)
Dept: FAMILY MEDICINE | Facility: CLINIC | Age: 63
End: 2023-02-17
Payer: COMMERCIAL

## 2023-02-17 DIAGNOSIS — E13.649 UNCONTROLLED DIABETES MELLITUS OF OTHER TYPE WITH HYPOGLYCEMIA, UNSPECIFIED HYPOGLYCEMIA COMA STATUS: Primary | ICD-10-CM

## 2023-02-17 DIAGNOSIS — Z79.4 ENCOUNTER FOR LONG-TERM (CURRENT) USE OF INSULIN: ICD-10-CM

## 2023-02-17 RX ORDER — INSULIN GLARGINE 100 [IU]/ML
INJECTION, SOLUTION SUBCUTANEOUS
Qty: 75 ML | Refills: 1 | Status: CANCELLED | OUTPATIENT
Start: 2023-02-17

## 2023-02-17 RX ORDER — BLOOD-GLUCOSE SENSOR
EACH MISCELLANEOUS
Status: CANCELLED | OUTPATIENT
Start: 2023-02-17

## 2023-02-17 RX ORDER — DULAGLUTIDE 1.5 MG/.5ML
1.5 INJECTION, SOLUTION SUBCUTANEOUS
Status: CANCELLED | OUTPATIENT
Start: 2023-02-17

## 2023-02-17 RX ORDER — INSULIN ASPART 100 [IU]/ML
INJECTION, SOLUTION INTRAVENOUS; SUBCUTANEOUS
Qty: 45 ML | Refills: 0 | Status: CANCELLED | OUTPATIENT
Start: 2023-02-17

## 2023-02-17 RX ORDER — BLOOD-GLUCOSE TRANSMITTER
EACH MISCELLANEOUS
Status: CANCELLED | OUTPATIENT
Start: 2023-02-17

## 2023-02-17 NOTE — TELEPHONE ENCOUNTER
----- Message from Shannon De La Fuente sent at 2/17/2023 12:17 PM CST -----  Contact: [phone]  Type:  RX Refill Request    Who Called: Chelsie  Refill or New Rx:Refill  RX Name and Strength:LANTUS SOLOSTAR U-100 INSULIN glargine 100 units/mL (3mL) SubQ pen  How is the patient currently taking it? (ex. 1XDay):INJECT 90 UNITS SUBCUTANEOUSLY EVERY MORNING  Is this a 30 day or 90 day RX:75 mL  Preferred Pharmacy with phone number:  [facility] [place], [place] - [address] [facility]  [address] [facility] [address]  Phone: [phone] Fax: [phone]  Local or Mail Order:Local  Ordering Provider:Dr Emmanuel  Would the patient rather a call back or a response via [facility]? Call back  Best Call Back Number:[phone]  Additional Information:   TRULICITY 1.5 mg/0.5 mL pen injector  Type:  RX Refill Request    Who Called: Chelsie  Refill or New Rx: Refill  RX Name and Strength:insulin aspart U-100 (NOVOLOG FLEXPEN U-100 INSULIN) 100 unit/mL (3 mL) InPn pen  How is the patient currently taking it? (ex. 1XDay):THREE TIMES DAILY with meals per SLIDING SCALE  Is this a 30 day or 90 day RX:75 mL  Preferred Pharmacy with phone number:  [facility]  [place], [place] - [address] [facility]  [address] [facility] [address]  Phone: [phone] Fax: [phone]  Local or Mail Order:Local  Ordering Provider:Dr. Emmanuel  Would the patient rather a call back or a response via [facility]? Call back  Best Call Back Number:[phone]  Additional Information:     Type:  RX Refill Request    Who Called: Chelsie  Refill or New Rx:Refill  RX Name and Strength:TRULICITY 1.5 mg/0.5 mL pen injector  How is the patient currently taking it? (ex. 1XDay):INJECT 1.5 MG INTO THE SKIN EVERY 7 DAYS   Is this a 30 day or 90 day RX:6 mL  Preferred Pharmacy with phone number:  [facility] - [place], [place] - [address] [facility]  [address] [facility] [address]  Phone: [phone] Fax: [phone]  Local or Mail Order:Local  Ordering  Provider:Dr. Emmanuel  Would the patient rather a call back or a response via [facility]? Call back  Best Call Back Number:[phone]  Additional Information:     Type:  RX Refill Request    Who Called: Chelsie  Refill or New Rx:refill  RX Name and Strength:DEXCOM G6 SENSOR Hannah and DEXCOM G6 TRANSMITTER Hannah  How is the patient currently taking it? (ex. 1XDay):n/a  Is this a 30 day or 90 day RX:n/a  Preferred Pharmacy with phone number:  [facility] [address] [facility]  [address] [facility] [address]  Phone: [phone] Fax: [phone]  Local or Mail Order:Local  Ordering Provider:Dr. Emmanuel  Would the patient rather a call back or a response via [facility]? Call back  Best Call Back Number:[phone]  Additional Information:

## 2023-02-17 NOTE — TELEPHONE ENCOUNTER
----- Message from Shannon De La Fuente sent at 2/17/2023 12:27 PM CST -----  Contact: [phone]  Type:  Patient Requesting Referral    Who Called:Chelsie  Does the patient already have the specialty appointment scheduled?:n/a  If yes, what is the date of that appointment?:n/a  Referral to What Specialty:Diabetes Management  Reason for Referral:Diabetes  Does the patient want the referral with a specific physician?:n/a  [facility]  Patient Requesting a Response?:yes  Would the patient rather a call back or a response via [facility]? Call back  Best Call Back Number:[phone]  Additional Information:

## 2023-02-17 NOTE — TELEPHONE ENCOUNTER
I have signed for the following orders AND/OR meds.  Please call the patient and ask the patient to schedule the testing AND/OR inform about any medications that were sent.      Orders Placed This Encounter   Procedures    Ambulatory referral/consult to Diabetes Education     Standing Status:   Future     Standing Expiration Date:   2/17/2024     Referral Priority:   Routine     Referral Type:   Consultation     Referral Reason:   Specialty Services Required     Requested Specialty:   Endocrinology     Number of Visits Requested:   1

## 2023-02-25 ENCOUNTER — PATIENT MESSAGE (OUTPATIENT)
Dept: UROLOGY | Facility: CLINIC | Age: 63
End: 2023-02-25
Payer: COMMERCIAL

## 2023-02-27 ENCOUNTER — TELEPHONE (OUTPATIENT)
Dept: UROLOGY | Facility: CLINIC | Age: 63
End: 2023-02-27
Payer: COMMERCIAL

## 2023-02-27 ENCOUNTER — LAB VISIT (OUTPATIENT)
Dept: LAB | Facility: HOSPITAL | Age: 63
End: 2023-02-27
Attending: UROLOGY
Payer: COMMERCIAL

## 2023-02-27 DIAGNOSIS — N39.0 ACUTE UTI: Primary | ICD-10-CM

## 2023-02-27 DIAGNOSIS — N39.0 ACUTE UTI: ICD-10-CM

## 2023-02-27 PROCEDURE — 87086 URINE CULTURE/COLONY COUNT: CPT | Performed by: UROLOGY

## 2023-02-27 RX ORDER — OXYBUTYNIN CHLORIDE 5 MG/1
5 TABLET ORAL 2 TIMES DAILY
Qty: 60 TABLET | Refills: 1 | Status: SHIPPED | OUTPATIENT
Start: 2023-02-27 | End: 2023-03-02

## 2023-02-27 RX ORDER — HYOSCYAMINE SULFATE 0.12 MG/1
0.12 TABLET SUBLINGUAL EVERY 4 HOURS PRN
Qty: 40 TABLET | Refills: 0 | Status: SHIPPED | OUTPATIENT
Start: 2023-02-27 | End: 2023-11-01

## 2023-02-28 ENCOUNTER — PATIENT MESSAGE (OUTPATIENT)
Dept: UROLOGY | Facility: CLINIC | Age: 63
End: 2023-02-28
Payer: COMMERCIAL

## 2023-03-01 LAB
BACTERIA UR CULT: NORMAL
BACTERIA UR CULT: NORMAL

## 2023-03-02 ENCOUNTER — HOSPITAL ENCOUNTER (OUTPATIENT)
Dept: RADIOLOGY | Facility: HOSPITAL | Age: 63
Discharge: HOME | End: 2023-03-02
Attending: FAMILY MEDICINE
Payer: COMMERCIAL

## 2023-03-02 ENCOUNTER — PROCEDURE VISIT (OUTPATIENT)
Dept: UROLOGY | Facility: CLINIC | Age: 63
End: 2023-03-02
Payer: COMMERCIAL

## 2023-03-02 ENCOUNTER — OFFICE VISIT (OUTPATIENT)
Dept: PULMONOLOGY | Facility: CLINIC | Age: 63
End: 2023-03-02
Payer: COMMERCIAL

## 2023-03-02 ENCOUNTER — CLINICAL SUPPORT (OUTPATIENT)
Dept: PULMONOLOGY | Facility: CLINIC | Age: 63
End: 2023-03-02
Payer: COMMERCIAL

## 2023-03-02 VITALS — BODY MASS INDEX: 43.4 KG/M2 | HEIGHT: 69 IN | WEIGHT: 293 LBS

## 2023-03-02 VITALS — WEIGHT: 293 LBS | BODY MASS INDEX: 43.4 KG/M2 | HEIGHT: 69 IN

## 2023-03-02 VITALS
RESPIRATION RATE: 15 BRPM | SYSTOLIC BLOOD PRESSURE: 108 MMHG | OXYGEN SATURATION: 97 % | DIASTOLIC BLOOD PRESSURE: 68 MMHG | BODY MASS INDEX: 43.4 KG/M2 | HEART RATE: 77 BPM | HEIGHT: 69 IN | WEIGHT: 293 LBS

## 2023-03-02 DIAGNOSIS — F17.210 NICOTINE DEPENDENCE, CIGARETTES, UNCOMPLICATED: ICD-10-CM

## 2023-03-02 DIAGNOSIS — F17.210 CIGARETTE NICOTINE DEPENDENCE WITHOUT COMPLICATION: ICD-10-CM

## 2023-03-02 DIAGNOSIS — G47.33 OBSTRUCTIVE SLEEP APNEA: Primary | ICD-10-CM

## 2023-03-02 DIAGNOSIS — Z72.0 TOBACCO ABUSE DISORDER: Chronic | ICD-10-CM

## 2023-03-02 DIAGNOSIS — J98.4 RESTRICTIVE LUNG DISEASE: ICD-10-CM

## 2023-03-02 DIAGNOSIS — G47.33 OBSTRUCTIVE SLEEP APNEA: ICD-10-CM

## 2023-03-02 DIAGNOSIS — C67.2 MALIGNANT NEOPLASM OF LATERAL WALL OF URINARY BLADDER: Primary | ICD-10-CM

## 2023-03-02 DIAGNOSIS — N39.0 ACUTE UTI: ICD-10-CM

## 2023-03-02 DIAGNOSIS — R06.09 DYSPNEA ON EXERTION: ICD-10-CM

## 2023-03-02 LAB
BRPFT: NORMAL
DLCO ADJ PRE: 15.73 ML/(MIN*MMHG)
DLCO SINGLE BREATH LLN: 19.9
DLCO SINGLE BREATH PRE REF: 61.3 %
DLCO SINGLE BREATH REF: 25.64
DLCOC SBVA LLN: 3.2
DLCOC SBVA PRE REF: 86.3 %
DLCOC SBVA REF: 4.45
DLCOC SINGLE BREATH LLN: 19.9
DLCOC SINGLE BREATH PRE REF: 61.3 %
DLCOC SINGLE BREATH REF: 25.64
DLCOVA LLN: 3.2
DLCOVA PRE REF: 86.3 %
DLCOVA PRE: 3.84 ML/(MIN*MMHG*L)
DLCOVA REF: 4.45
DLVAADJ PRE: 3.84 ML/(MIN*MMHG*L)
ERV LLN: -16449.16
ERV PRE REF: 44.5 %
ERV REF: 0.84
FEF 25 75 CHG: 14.6 %
FEF 25 75 LLN: 1.23
FEF 25 75 POST REF: 75.4 %
FEF 25 75 PRE REF: 65.8 %
FEF 25 75 REF: 2.44
FET100 CHG: -34.3 %
FEV1 CHG: 4.9 %
FEV1 FVC CHG: 2.6 %
FEV1 FVC LLN: 66
FEV1 FVC POST REF: 97.4 %
FEV1 FVC PRE REF: 94.9 %
FEV1 FVC REF: 79
FEV1 LLN: 2.15
FEV1 POST REF: 75 %
FEV1 PRE REF: 71.5 %
FEV1 REF: 2.87
FRCPLETH LLN: 2.16
FRCPLETH PREREF: 99.4 %
FRCPLETH REF: 2.98
FVC CHG: 2.3 %
FVC LLN: 2.78
FVC POST REF: 76.3 %
FVC PRE REF: 74.6 %
FVC REF: 3.68
IVC PRE: 2.42 L
IVC SINGLE BREATH LLN: 2.78
IVC SINGLE BREATH PRE REF: 65.8 %
IVC SINGLE BREATH REF: 3.68
MVV LLN: 95
MVV PRE REF: 66.4 %
MVV REF: 111
PEF CHG: -20.2 %
PEF LLN: 5.04
PEF POST REF: 63.8 %
PEF PRE REF: 80 %
PEF REF: 7.02
POST FEF 25 75: 1.84 L/S
POST FET 100: 4.9 SEC
POST FEV1 FVC: 76.48 %
POST FEV1: 2.15 L
POST FVC: 2.81 L
POST PEF: 4.48 L/S
PRE DLCO: 15.73 ML/(MIN*MMHG)
PRE ERV: 0.37 L
PRE FEF 25 75: 1.61 L/S
PRE FET 100: 7.46 SEC
PRE FEV1 FVC: 74.55 %
PRE FEV1: 2.05 L
PRE FRC PL: 2.96 L
PRE FVC: 2.75 L
PRE MVV: 74 L/MIN
PRE PEF: 5.62 L/S
PRE RV: 2.54 L
PRE TLC: 5.32 L
RAW LLN: 3.06
RAW PRE REF: 131.4 %
RAW PRE: 4.02 CMH2O*S/L
RAW REF: 3.06
RV LLN: 1.57
RV PRE REF: 118.6 %
RV REF: 2.14
RVTLC LLN: 30
RVTLC PRE REF: 120.3 %
RVTLC PRE: 47.76 %
RVTLC REF: 40
TLC LLN: 4.77
TLC PRE REF: 92.4 %
TLC REF: 5.76
VA PRE: 4.1 L
VA SINGLE BREATH LLN: 5.61
VA SINGLE BREATH PRE REF: 73 %
VA SINGLE BREATH REF: 5.61
VC LLN: 2.78
VC PRE REF: 75.5 %
VC PRE: 2.78 L
VC REF: 3.68
VTGRAWPRE: 3.1 L

## 2023-03-02 PROCEDURE — 3074F SYST BP LT 130 MM HG: CPT | Mod: CPTII,S$GLB,, | Performed by: HOSPITALIST

## 2023-03-02 PROCEDURE — 1160F PR REVIEW ALL MEDS BY PRESCRIBER/CLIN PHARMACIST DOCUMENTED: ICD-10-PCS | Mod: CPTII,S$GLB,, | Performed by: HOSPITALIST

## 2023-03-02 PROCEDURE — 99999 PR PBB SHADOW E&M-EST. PATIENT-LVL II: CPT | Mod: PBBFAC,,,

## 2023-03-02 PROCEDURE — 94618 PULMONARY STRESS TESTING: ICD-10-PCS | Mod: S$GLB,,, | Performed by: INTERNAL MEDICINE

## 2023-03-02 PROCEDURE — 3078F DIAST BP <80 MM HG: CPT | Mod: CPTII,S$GLB,, | Performed by: HOSPITALIST

## 2023-03-02 PROCEDURE — 99999 PR PBB SHADOW E&M-EST. PATIENT-LVL IV: CPT | Mod: PBBFAC,,, | Performed by: HOSPITALIST

## 2023-03-02 PROCEDURE — 94618 PULMONARY STRESS TESTING: CPT | Mod: S$GLB,,, | Performed by: INTERNAL MEDICINE

## 2023-03-02 PROCEDURE — 99999 PR PBB SHADOW E&M-EST. PATIENT-LVL II: ICD-10-PCS | Mod: PBBFAC,,,

## 2023-03-02 PROCEDURE — 71271 CT THORAX LUNG CANCER SCR C-: CPT | Mod: TC

## 2023-03-02 PROCEDURE — 94729 DIFFUSING CAPACITY: CPT | Mod: S$GLB,,, | Performed by: INTERNAL MEDICINE

## 2023-03-02 PROCEDURE — 94726 PULM FUNCT TST PLETHYSMOGRAP: ICD-10-PCS | Mod: S$GLB,,, | Performed by: INTERNAL MEDICINE

## 2023-03-02 PROCEDURE — 1160F RVW MEDS BY RX/DR IN RCRD: CPT | Mod: CPTII,S$GLB,, | Performed by: HOSPITALIST

## 2023-03-02 PROCEDURE — 1159F MED LIST DOCD IN RCRD: CPT | Mod: CPTII,S$GLB,, | Performed by: HOSPITALIST

## 2023-03-02 PROCEDURE — 3074F PR MOST RECENT SYSTOLIC BLOOD PRESSURE < 130 MM HG: ICD-10-PCS | Mod: CPTII,S$GLB,, | Performed by: HOSPITALIST

## 2023-03-02 PROCEDURE — 94729 PR C02/MEMBANE DIFFUSE CAPACITY: ICD-10-PCS | Mod: S$GLB,,, | Performed by: INTERNAL MEDICINE

## 2023-03-02 PROCEDURE — 99213 PR OFFICE/OUTPT VISIT, EST, LEVL III, 20-29 MIN: ICD-10-PCS | Mod: S$GLB,,, | Performed by: HOSPITALIST

## 2023-03-02 PROCEDURE — 1159F PR MEDICATION LIST DOCUMENTED IN MEDICAL RECORD: ICD-10-PCS | Mod: CPTII,S$GLB,, | Performed by: HOSPITALIST

## 2023-03-02 PROCEDURE — 3008F BODY MASS INDEX DOCD: CPT | Mod: CPTII,S$GLB,, | Performed by: HOSPITALIST

## 2023-03-02 PROCEDURE — 71271 CT CHEST LUNG SCREENING LOW DOSE: ICD-10-PCS | Mod: 26,,, | Performed by: RADIOLOGY

## 2023-03-02 PROCEDURE — 10120 PR REMOVE FOREIGN BODY SIMPLE: ICD-10-PCS | Mod: S$GLB,,, | Performed by: UROLOGY

## 2023-03-02 PROCEDURE — 3078F PR MOST RECENT DIASTOLIC BLOOD PRESSURE < 80 MM HG: ICD-10-PCS | Mod: CPTII,S$GLB,, | Performed by: HOSPITALIST

## 2023-03-02 PROCEDURE — 4010F PR ACE/ARB THEARPY RXD/TAKEN: ICD-10-PCS | Mod: CPTII,S$GLB,, | Performed by: HOSPITALIST

## 2023-03-02 PROCEDURE — 94060 EVALUATION OF WHEEZING: CPT | Mod: 59,S$GLB,, | Performed by: INTERNAL MEDICINE

## 2023-03-02 PROCEDURE — 3008F PR BODY MASS INDEX (BMI) DOCUMENTED: ICD-10-PCS | Mod: CPTII,S$GLB,, | Performed by: HOSPITALIST

## 2023-03-02 PROCEDURE — 99213 OFFICE O/P EST LOW 20 MIN: CPT | Mod: S$GLB,,, | Performed by: HOSPITALIST

## 2023-03-02 PROCEDURE — 94726 PLETHYSMOGRAPHY LUNG VOLUMES: CPT | Mod: S$GLB,,, | Performed by: INTERNAL MEDICINE

## 2023-03-02 PROCEDURE — 94060 PR EVAL OF BRONCHOSPASM: ICD-10-PCS | Mod: 59,S$GLB,, | Performed by: INTERNAL MEDICINE

## 2023-03-02 PROCEDURE — 99999 PR PBB SHADOW E&M-EST. PATIENT-LVL I: ICD-10-PCS | Mod: PBBFAC,,,

## 2023-03-02 PROCEDURE — 71271 CT THORAX LUNG CANCER SCR C-: CPT | Mod: 26,,, | Performed by: RADIOLOGY

## 2023-03-02 PROCEDURE — 99999 PR PBB SHADOW E&M-EST. PATIENT-LVL I: CPT | Mod: PBBFAC,,,

## 2023-03-02 PROCEDURE — 10120 INC&RMVL FB SUBQ TISS SMPL: CPT | Mod: S$GLB,,, | Performed by: UROLOGY

## 2023-03-02 PROCEDURE — 99999 PR PBB SHADOW E&M-EST. PATIENT-LVL IV: ICD-10-PCS | Mod: PBBFAC,,, | Performed by: HOSPITALIST

## 2023-03-02 PROCEDURE — 4010F ACE/ARB THERAPY RXD/TAKEN: CPT | Mod: CPTII,S$GLB,, | Performed by: HOSPITALIST

## 2023-03-02 RX ORDER — CIPROFLOXACIN 500 MG/1
500 TABLET ORAL
Status: COMPLETED | OUTPATIENT
Start: 2023-03-02 | End: 2023-03-02

## 2023-03-02 RX ADMIN — CIPROFLOXACIN 500 MG: 500 TABLET ORAL at 02:03

## 2023-03-02 NOTE — PROCEDURES
"[facility]     SUMMARY     Ordering Provider: Gurpreet FREGOSO   Interpreting Provider: Dr Frausto  Performing nurse/tech/RT: YVROSE RRT  Diagnosis:  (tobacco abuse disorder)  Height: 5' 9" (175.3 cm)  Weight: (!) 136.5 kg (300 lb 14.9 oz)  BMI (Calculated): 44.4   Patient Race:             Phase Oxygen Assessment Supplemental O2 Heart   Rate Blood Pressure Luh Dyspnea Scale Rating   Resting 97 % Room Air 77 bpm 108/68 0   Exercise        Minute        1 97 % Room Air 89 bpm     2 96 % Room Air 95 bpm     3 95 % Room Air 100 bpm     4 95 % Room Air 102 bpm     5 95 % Room Air 106 bpm     6  95 % Room Air 107 bpm 198/84 3   Recovery        Minute        1 96 % Room Air 87 bpm     2 97 % Room Air 81 bpm     3 98 % Room Air 80 bpm     4 98 % Room Air 81 bpm 141/83 1     Six Minute Walk Summary  6MWT Status: completed without stopping  Patient Reported: Dyspnea, Calf pain     Interpretation:  Did the patient stop or pause?: No         Total Time Walked (Calculated): 360 seconds  Final Partial Lap Distance (feet): 25 feet  Total Distance Meters (Calculated): 373.38 meters  Predicted Distance Meters (Calculated): 371.72 meters  Percentage of Predicted (Calculated): 100.45  Peak VO2 (Calculated): 15.18  Mets: 4.34  Has The Patient Had a Previous Six Minute Walk Test?: No       Previous 6MWT Results  Has The Patient Had a Previous Six Minute Walk Test?: No      Six minute walk distance is 373.38m /371.72 meters (100.45 % predicted) with light.Patient did complete the study, walking 360 seconds of the 360 second test . During exercise, there was no  significant desaturation while breathing room air .Lowest oxygen saturation was 95% .Maximum heart rate during exercise was 107 bpm which is 67 % of maximum predicted heart rate of 159 bpm. Blood pressure increased significantly and Heart rate remained stable Based upon age and body mass index, exercise capacity is normal.  Peak VO2 " during walking was 15.18 ml/kg/min which is 44 % of predicted Peak VO2 max of 34 ml/kg/min based on a resting heart rate of 77/min.   Patient has not had a previous study. No previous study performed.

## 2023-03-02 NOTE — ASSESSMENT & PLAN NOTE
- started Chantix, has been able to decrease amount of cigarettes per day  - LDCT negative for any nodules 3/2/2023, continue annual screening

## 2023-03-02 NOTE — ASSESSMENT & PLAN NOTE
- Most likely secondary to obesity, LDCT without any parenchymal changes, no paralyzed diaphragm   - counseled on weight loss

## 2023-03-02 NOTE — PROCEDURES
Procedures  Chief Complaint:   Encounter Diagnoses   Name Primary?    Malignant neoplasm of lateral wall of urinary bladder Yes    Acute UTI        HPI:    3/2/23- here today for stent removal.  62-year-old female who comes in from [place] with a new diagnosis of possible bladder cancer.  She states this past summer she had difficulty voiding and a Tomas catheter was placed.  She did have a little bit hematuria afterwards, could have been related to the catheter placement.  She was doing well until on proximally Hank she began to have gross hematuria with no other significant symptoms.  This eventually cleared up on its own, CT scan demonstrated possible bladder masses and she was referred to an outside urologist.  Cystoscopy demonstrated significant tumor surrounding the right ureteral orifice, surgeries were attempted, but could not get scheduled due to other factors for preoperative clearance.  Patient is now cleared, but has decided to come to [facility] for treatment.  Patient is a current smoker, no other lower urinary tract symptoms.  No other urological or gynecological history, she has all of her female organs.  No evidence of true incontinence, no constipation.  She would an uncle with bladder cancer, no other family history of urological cancers or stones.    Allergies:  Penicillins    Medications:  has a current medication list which includes the following prescription(s): atorvastatin, azelastine, benzonatate, dexcom g6 sensor, cycloset, dexcom g6 sensor, dexcom g6 transmitter, ezetimibe, baqsimi, hyoscyamine, insulin, insulin aspart u-100, lisinopril, multivitamin, phenazopyridine, timolol maleate 0.5%, trulicity, and varenicline.    Review of Systems:  General: No fever, chills, fatigability, or weight loss.  Skin: No rashes, itching, or changes in color or texture of skin.  Chest: Denies EASON, cyanosis, wheezing, cough, and sputum production.  Abdomen: Appetite fine. No weight loss. Denies diarrhea,  abdominal pain, hematemesis, or blood in stool.  Musculoskeletal: No joint stiffness or swelling. Denies back pain.  : As above.  All other review of systems negative.    PMH:   has a past medical history of Asthma, Cigarette nicotine dependence without complication (2017), Colon polyp, DM (diabetes mellitus) type II uncontrolled with eye manifestation, Hyperplastic colon polyp (2015), Hypertension, Morbid obesity (10/08/2014), and Sleep apnea.    PSH:   has a past surgical history that includes  section; Eye surgery (Right); turbt (transurethral resection of bladder tumor) (N/A, 2023); and Cystoscopy with ureteroscopy, retrograde pyelography, and insertion of stent (Bilateral, 2023).    FamHx: family history includes Bladder Cancer in her paternal uncle; Coronary artery disease in her father; Diabetes in her father; Hypertension in her father; Lung cancer in her father; Lymphoma in her paternal uncle; Pancreatic cancer in her mother; Stroke in her father.    SocHx:  reports that she has been smoking cigarettes. She has a 27.75 pack-year smoking history. She has never used smokeless tobacco. She reports that she does not drink alcohol and does not use drugs.      Physical Exam:  There were no vitals filed for this visit.    General: A&Ox3, no apparent distress, no deformities  Neck: No masses, normal ROM  Lungs: normal inspiration, no use of accessory muscles  Heart: normal pulse, no arrhythmias  Abdomen: Soft, NT, ND, no masses, no hernias, no hepatosplenomegaly  Skin: The skin is warm and dry. No jaundice.  Ext: No c/c/e.  : 3/23- No pelvic floor prolapse.  Normal introitus, no urethral abnomralities. No Perineal abnormalities.    Labs/Studies:   CT urogram right bladder wall tumor     Procedure:  Cystoscopy with removal of foreign body - simple (ureteral stent)    Procedure in Detail: After proper consents were obtained, the patient was prepped and draped in normal sterile  fashion for diagnostic cystoscopy. 5 ml of lidocaine jelly was instilled in the urethra. The flexible cystoscope was then introduced into the urethra, and advanced into the bladder under direct vision. The previously placed stent was observed and was held with graspers.  The cystoscope was then removed, and the procedure terminated. The stent was removed in its entirety.     Findings:  ureteral stent removed without difficulty      Impression/Plan:     Bladder cancer-  pTa high TURBT, retros normal  1/31/23    Patient tolerated removal of the stent today, I have informed her of the risk of ureteral edema and what to do if this occurs.  Will now pursue surveillance screening, I will see her in 3 months with cystoscopy and cytology.  Call with any complaints in the meantime.

## 2023-03-02 NOTE — PROGRESS NOTES
Subjective:       Patient ID: Chelsie Garrett is a 62 y.o. female.    Chief Complaint: Follow up sleep apnea    Interval Follow Up:     3/2/2023  Mrs. Garrett is having issues with her insurance and was unable to get the sleep study done in the interim.     PFT 3/2/23 reviewed- Mild restriction (FVC 74.6%, FEV1 71.5%, FEV1/FVC 75%), did not respond with bronchodilators, DLCO and MVV mildly decreased  Walk 3/2/23- Walked 100.45% predicted, lowest SpO2 95% on room air, blood pressure significantly elevated to 198/84 in 6th minute, recovered to 141/83 at 4 minute rest.   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   She is following up with Urology this afternoon to have the stent removed and presumably to review path.     HPI     62 year old female with history of bladder cancer (s/p resection 1/31/2023, path pending), HTN, HLD, obesity, IDDM2, tobacco user who presents to Pulmonary clinic for evaluation of sleep apnea. Records reviewed, had a home sleep study 6/2021 notable for AHI 20.6, 23 minutes with SpO2 between 70-79%, snoring 98% time above , patient was then lost to follow up. LDCT ordered by PCP, no prior in system, did have an abd/pelvis in care everywhere that noted a 5mm left lower lobe nodule (image not available) 8/2022.     6-8 cigarettes per day, trying to quit but having difficulty with triggers  Dyspnea with exertion, bending down  Coughing with post-nasal drip at night  Denies wheezing, hemoptysis.     Review of Systems   Respiratory:  Positive for snoring. Negative for dyspnea on extertion.      Objective:      Physical Exam   Constitutional: She is oriented to person, place,  and time. She appears well-developed and well-nourished. She is obese.   Cardiovascular: Normal rate and regular rhythm.   Pulmonary/Chest: Effort normal and breath sounds normal.   Musculoskeletal:         General: No edema.   Neurological: She is alert and oriented to person, place, and time.   Skin: Skin is warm and dry.   Psychiatric: She has a normal mood and affect.   Personal Diagnostic Review  As above, LDCT, PFT and walk personally reviewed  No flowsheet data found.      Assessment:       No diagnosis found.    Outpatient Encounter Medications as of 3/2/2023   Medication Sig Dispense Refill    atorvastatin (LIPITOR) 80 MG tablet Take 1 tablet (80 mg total) by mouth every evening. 90 tablet 3    azelastine (ASTELIN) 137 mcg (0.1 %) nasal spray 2 sprays (274 mcg total) by Nasal route 2 (two) times daily. 30 mL 11    benzonatate (TESSALON) 200 MG capsule Take 1 capsule (200 mg total) by mouth 3 (three) times daily as needed for Cough. (Patient not taking: Reported on 2/7/2023) 30 capsule 0    blood-glucose sensor (DEXCOM G6 SENSOR) Hannah Use as directed for continuous glucose monitoring. Change sensor every 10 days.      bromocriptine (CYCLOSET) 0.8 mg Tab Take 0.8 mg by mouth every evening. Take 3 pills every evening      DEXCOM G6 SENSOR Hannah SMARTSIG:Topical Every 10 Days      DEXCOM G6 TRANSMITTER Hannah Use as directed to check glucose. Change every 90 days.      ezetimibe (ZETIA) 10 mg tablet Take 1 tablet (10 mg total) by mouth every evening. 90 tablet 3    glucagon (BAQSIMI) 3 mg/actuation Spry SPRAY 1 SPRAY in one nostril FOR 1 dose      hyoscyamine (LEVSIN/SL) 0.125 mg Subl Place 1 tablet (0.125 mg total) under the tongue every 4 (four) hours as needed (bladder spasms). 40 tablet 0    insulin (LANTUS SOLOSTAR U-100 INSULIN) glargine 100 units/mL SubQ pen 90 units SQ every AM. 75 mL 11    insulin aspart U-100 (NOVOLOG FLEXPEN U-100 INSULIN) 100 unit/mL (3 mL) InPn pen THREE TIMES DAILY with meals per  SLIDING SCALE 45 mL 11    [] levoFLOXacin (LEVAQUIN) 500 MG tablet Take 1 tablet (500 mg total) by mouth once daily. for 7 days 7 tablet 0    lisinopriL (PRINIVIL,ZESTRIL) 2.5 MG tablet Take 1 tablet (2.5 mg total) by mouth every evening. 90 tablet 3    multivitamin capsule Take 1 capsule by mouth once daily.      oxybutynin (DITROPAN) 5 MG Tab Take 1 tablet (5 mg total) by mouth 2 (two) times daily. 60 tablet 1    phenazopyridine (PYRIDIUM) 200 MG tablet Take 1 tablet (200 mg total) by mouth 3 (three) times daily as needed (burning). 15 tablet 0    timolol maleate 0.5% (TIMOPTIC) 0.5 % Drop Place 1 drop into both eyes 2 (two) times daily.      TRULICITY 1.5 mg/0.5 mL pen injector INJECT 1.5 MG INTO THE SKIN EVERY 7 DAYS (Patient taking differently: Inject 1.5 mg into the skin every 7 days. ) 6 mL 1    varenicline (CHANTIX STARTING MONTH BOX) 0.5 mg (11)- 1 mg (42) tablet Take one 0.5mg tab by mouth once daily X3 days,then increase to one 0.5mg tab twice daily X4 days,then increase to one 1mg tab twice daily 1 each 0    [DISCONTINUED] hyoscyamine (LEVSIN/SL) 0.125 mg Subl Place 2 tablets (0.25 mg total) under the tongue every 4 (four) hours as needed (spasms). 40 tablet 0    [DISCONTINUED] insulin aspart U-100 (NOVOLOG FLEXPEN U-100 INSULIN) 100 unit/mL (3 mL) InPn pen THREE TIMES DAILY with meals per SLIDING SCALE 45 mL 0    [DISCONTINUED] LANTUS SOLOSTAR U-100 INSULIN glargine 100 units/mL (3mL) SubQ pen INJECT 90 UNITS SUBCUTANEOUSLY EVERY MORNING 75 mL 1     No facility-administered encounter medications on file as of 3/2/2023.     No orders of the defined types were placed in this encounter.        Plan:       Problem List Items Addressed This Visit          Pulmonary    Restrictive lung disease     - Most likely secondary to obesity, LDCT without any parenchymal changes, no paralyzed diaphragm   - counseled on weight loss            Other    Cigarette nicotine dependence without complication     -  started Chantix, has been able to decrease amount of cigarettes per day  - LDCT negative for any nodules 3/2/2023, continue annual screening           Obstructive sleep apnea     - insurance issues with HSAT, patient is working on this, I have reached out on our end           Plan discussed with patient and she expressed understanding, all questions answered. Follow up in 6 months, sooner if able to have HSAT performed, I will continue to explore options on having this done affordably on our end.     Addendum:   I discussed with insurance team, patient is still within range and does not yet require a new HSAT. She has lost weight since the sleep study, but will titrate autopap if needed on follow up. Order for CPAP placed.

## 2023-03-02 NOTE — PROGRESS NOTES
..Per Dr. Clark oral ciprofloxacin 500 MG was given to pt. Pt instructed to remain in clinic for 15 minutes to monitor for signs & symptoms of adverse reaction. Pt verbalized understanding.      Loraine Milanes RN

## 2023-03-15 DIAGNOSIS — Z12.31 OTHER SCREENING MAMMOGRAM: ICD-10-CM

## 2023-03-16 LAB
LEFT EYE DM RETINOPATHY: POSITIVE
RIGHT EYE DM RETINOPATHY: POSITIVE

## 2023-03-24 ENCOUNTER — PATIENT OUTREACH (OUTPATIENT)
Dept: ADMINISTRATIVE | Facility: HOSPITAL | Age: 63
End: 2023-03-24
Payer: COMMERCIAL

## 2023-03-25 ENCOUNTER — PATIENT MESSAGE (OUTPATIENT)
Dept: UROLOGY | Facility: CLINIC | Age: 63
End: 2023-03-25
Payer: COMMERCIAL

## 2023-03-27 DIAGNOSIS — N39.0 ACUTE UTI: Primary | ICD-10-CM

## 2023-03-27 RX ORDER — LEVOFLOXACIN 500 MG/1
500 TABLET, FILM COATED ORAL DAILY
Qty: 10 TABLET | Refills: 0 | Status: SHIPPED | OUTPATIENT
Start: 2023-03-27 | End: 2023-04-06

## 2023-03-29 ENCOUNTER — HOSPITAL ENCOUNTER (OUTPATIENT)
Dept: RADIOLOGY | Facility: HOSPITAL | Age: 63
Discharge: HOME | End: 2023-03-29
Attending: FAMILY MEDICINE
Payer: COMMERCIAL

## 2023-03-29 VITALS — BODY MASS INDEX: 43.4 KG/M2 | WEIGHT: 293 LBS | HEIGHT: 69 IN

## 2023-03-29 DIAGNOSIS — Z12.31 OTHER SCREENING MAMMOGRAM: ICD-10-CM

## 2023-03-29 PROCEDURE — 77067 SCR MAMMO BI INCL CAD: CPT | Mod: TC,PO

## 2023-03-29 PROCEDURE — 77067 SCR MAMMO BI INCL CAD: CPT | Mod: 26,,, | Performed by: RADIOLOGY

## 2023-03-29 PROCEDURE — 77067 MAMMO DIGITAL SCREENING BILAT: ICD-10-PCS | Mod: 26,,, | Performed by: RADIOLOGY

## 2023-03-30 ENCOUNTER — OFFICE VISIT (OUTPATIENT)
Dept: FAMILY MEDICINE | Facility: CLINIC | Age: 63
End: 2023-03-30
Payer: COMMERCIAL

## 2023-03-30 ENCOUNTER — LAB VISIT (OUTPATIENT)
Dept: LAB | Facility: HOSPITAL | Age: 63
End: 2023-03-30
Attending: FAMILY MEDICINE
Payer: COMMERCIAL

## 2023-03-30 VITALS
WEIGHT: 293 LBS | OXYGEN SATURATION: 97 % | HEART RATE: 78 BPM | DIASTOLIC BLOOD PRESSURE: 60 MMHG | SYSTOLIC BLOOD PRESSURE: 110 MMHG | BODY MASS INDEX: 44.41 KG/M2 | TEMPERATURE: 98 F | HEIGHT: 68 IN

## 2023-03-30 DIAGNOSIS — U09.9 POST-COVID CHRONIC FATIGUE: ICD-10-CM

## 2023-03-30 DIAGNOSIS — R53.82 CHRONIC FATIGUE: Primary | ICD-10-CM

## 2023-03-30 DIAGNOSIS — G93.32 POST-COVID CHRONIC FATIGUE: ICD-10-CM

## 2023-03-30 DIAGNOSIS — R53.82 CHRONIC FATIGUE: ICD-10-CM

## 2023-03-30 LAB
ALBUMIN SERPL BCP-MCNC: 3.4 G/DL (ref 3.5–5.2)
ALP SERPL-CCNC: 83 U/L (ref 55–135)
ALT SERPL W/O P-5'-P-CCNC: 15 U/L (ref 10–44)
ANION GAP SERPL CALC-SCNC: 9 MMOL/L (ref 8–16)
AST SERPL-CCNC: 15 U/L (ref 10–40)
BASOPHILS # BLD AUTO: 0.05 K/UL (ref 0–0.2)
BASOPHILS NFR BLD: 0.7 % (ref 0–1.9)
BILIRUB SERPL-MCNC: 0.4 MG/DL (ref 0.1–1)
BUN SERPL-MCNC: 15 MG/DL (ref 8–23)
CALCIUM SERPL-MCNC: 9.7 MG/DL (ref 8.7–10.5)
CHLORIDE SERPL-SCNC: 102 MMOL/L (ref 95–110)
CO2 SERPL-SCNC: 28 MMOL/L (ref 23–29)
CREAT SERPL-MCNC: 0.7 MG/DL (ref 0.5–1.4)
DIFFERENTIAL METHOD: NORMAL
EOSINOPHIL # BLD AUTO: 0.2 K/UL (ref 0–0.5)
EOSINOPHIL NFR BLD: 2.2 % (ref 0–8)
ERYTHROCYTE [DISTWIDTH] IN BLOOD BY AUTOMATED COUNT: 12.9 % (ref 11.5–14.5)
EST. GFR  (NO RACE VARIABLE): >60 ML/MIN/1.73 M^2
ESTIMATED AVG GLUCOSE: 243 MG/DL (ref 68–131)
FERRITIN SERPL-MCNC: 109 NG/ML (ref 20–300)
GLUCOSE SERPL-MCNC: 228 MG/DL (ref 70–110)
HBA1C MFR BLD: 10.1 % (ref 4–5.6)
HCT VFR BLD AUTO: 45.2 % (ref 37–48.5)
HGB BLD-MCNC: 14.7 G/DL (ref 12–16)
IMM GRANULOCYTES # BLD AUTO: 0.01 K/UL (ref 0–0.04)
IMM GRANULOCYTES NFR BLD AUTO: 0.1 % (ref 0–0.5)
IRON SERPL-MCNC: 79 UG/DL (ref 30–160)
LYMPHOCYTES # BLD AUTO: 1.9 K/UL (ref 1–4.8)
LYMPHOCYTES NFR BLD: 25.9 % (ref 18–48)
MCH RBC QN AUTO: 30.6 PG (ref 27–31)
MCHC RBC AUTO-ENTMCNC: 32.5 G/DL (ref 32–36)
MCV RBC AUTO: 94 FL (ref 82–98)
MONOCYTES # BLD AUTO: 0.6 K/UL (ref 0.3–1)
MONOCYTES NFR BLD: 8.8 % (ref 4–15)
NEUTROPHILS # BLD AUTO: 4.4 K/UL (ref 1.8–7.7)
NEUTROPHILS NFR BLD: 62.3 % (ref 38–73)
NRBC BLD-RTO: 0 /100 WBC
PLATELET # BLD AUTO: 216 K/UL (ref 150–450)
PMV BLD AUTO: 10.1 FL (ref 9.2–12.9)
POTASSIUM SERPL-SCNC: 5 MMOL/L (ref 3.5–5.1)
PROT SERPL-MCNC: 6.8 G/DL (ref 6–8.4)
RBC # BLD AUTO: 4.81 M/UL (ref 4–5.4)
SATURATED IRON: 23 % (ref 20–50)
SODIUM SERPL-SCNC: 139 MMOL/L (ref 136–145)
TOTAL IRON BINDING CAPACITY: 340 UG/DL (ref 250–450)
TRANSFERRIN SERPL-MCNC: 230 MG/DL (ref 200–375)
TSH SERPL DL<=0.005 MIU/L-ACNC: 2.06 UIU/ML (ref 0.4–4)
VIT B12 SERPL-MCNC: 528 PG/ML (ref 210–950)
WBC # BLD AUTO: 7.14 K/UL (ref 3.9–12.7)

## 2023-03-30 PROCEDURE — 3078F DIAST BP <80 MM HG: CPT | Mod: CPTII,S$GLB,, | Performed by: NURSE PRACTITIONER

## 2023-03-30 PROCEDURE — 1160F PR REVIEW ALL MEDS BY PRESCRIBER/CLIN PHARMACIST DOCUMENTED: ICD-10-PCS | Mod: CPTII,S$GLB,, | Performed by: NURSE PRACTITIONER

## 2023-03-30 PROCEDURE — 85025 COMPLETE CBC W/AUTO DIFF WBC: CPT | Performed by: NURSE PRACTITIONER

## 2023-03-30 PROCEDURE — 83036 HEMOGLOBIN GLYCOSYLATED A1C: CPT | Performed by: FAMILY MEDICINE

## 2023-03-30 PROCEDURE — 3078F PR MOST RECENT DIASTOLIC BLOOD PRESSURE < 80 MM HG: ICD-10-PCS | Mod: CPTII,S$GLB,, | Performed by: NURSE PRACTITIONER

## 2023-03-30 PROCEDURE — 4010F ACE/ARB THERAPY RXD/TAKEN: CPT | Mod: CPTII,S$GLB,, | Performed by: NURSE PRACTITIONER

## 2023-03-30 PROCEDURE — 99999 PR PBB SHADOW E&M-EST. PATIENT-LVL V: CPT | Mod: PBBFAC,,, | Performed by: NURSE PRACTITIONER

## 2023-03-30 PROCEDURE — 1160F RVW MEDS BY RX/DR IN RCRD: CPT | Mod: CPTII,S$GLB,, | Performed by: NURSE PRACTITIONER

## 2023-03-30 PROCEDURE — 82607 VITAMIN B-12: CPT | Performed by: NURSE PRACTITIONER

## 2023-03-30 PROCEDURE — 4010F PR ACE/ARB THEARPY RXD/TAKEN: ICD-10-PCS | Mod: CPTII,S$GLB,, | Performed by: NURSE PRACTITIONER

## 2023-03-30 PROCEDURE — 84466 ASSAY OF TRANSFERRIN: CPT | Performed by: NURSE PRACTITIONER

## 2023-03-30 PROCEDURE — 1159F MED LIST DOCD IN RCRD: CPT | Mod: CPTII,S$GLB,, | Performed by: NURSE PRACTITIONER

## 2023-03-30 PROCEDURE — 99999 PR PBB SHADOW E&M-EST. PATIENT-LVL V: ICD-10-PCS | Mod: PBBFAC,,, | Performed by: NURSE PRACTITIONER

## 2023-03-30 PROCEDURE — 99214 OFFICE O/P EST MOD 30 MIN: CPT | Mod: S$GLB,,, | Performed by: NURSE PRACTITIONER

## 2023-03-30 PROCEDURE — 1159F PR MEDICATION LIST DOCUMENTED IN MEDICAL RECORD: ICD-10-PCS | Mod: CPTII,S$GLB,, | Performed by: NURSE PRACTITIONER

## 2023-03-30 PROCEDURE — 99214 PR OFFICE/OUTPT VISIT, EST, LEVL IV, 30-39 MIN: ICD-10-PCS | Mod: S$GLB,,, | Performed by: NURSE PRACTITIONER

## 2023-03-30 PROCEDURE — 36415 COLL VENOUS BLD VENIPUNCTURE: CPT | Mod: PO | Performed by: NURSE PRACTITIONER

## 2023-03-30 PROCEDURE — 3074F PR MOST RECENT SYSTOLIC BLOOD PRESSURE < 130 MM HG: ICD-10-PCS | Mod: CPTII,S$GLB,, | Performed by: NURSE PRACTITIONER

## 2023-03-30 PROCEDURE — 3008F BODY MASS INDEX DOCD: CPT | Mod: CPTII,S$GLB,, | Performed by: NURSE PRACTITIONER

## 2023-03-30 PROCEDURE — 82728 ASSAY OF FERRITIN: CPT | Performed by: NURSE PRACTITIONER

## 2023-03-30 PROCEDURE — 3008F PR BODY MASS INDEX (BMI) DOCUMENTED: ICD-10-PCS | Mod: CPTII,S$GLB,, | Performed by: NURSE PRACTITIONER

## 2023-03-30 PROCEDURE — 84443 ASSAY THYROID STIM HORMONE: CPT | Performed by: NURSE PRACTITIONER

## 2023-03-30 PROCEDURE — 3074F SYST BP LT 130 MM HG: CPT | Mod: CPTII,S$GLB,, | Performed by: NURSE PRACTITIONER

## 2023-03-30 PROCEDURE — 80053 COMPREHEN METABOLIC PANEL: CPT | Performed by: NURSE PRACTITIONER

## 2023-03-30 NOTE — PROGRESS NOTES
Subjective:       Patient ID: Chelsie Garrett is a 62 y.o. female.    Chief Complaint: Annual Exam (Not fasting)    Fatigue  This is a chronic (started after having Covid) problem. Episode onset: 6 months. The problem occurs constantly. The problem has been unchanged. Associated symptoms include fatigue. Pertinent negatives include no abdominal pain, arthralgias, chest pain, coughing, fever, headaches, myalgias, rash, sore throat or vomiting. Nothing aggravates the symptoms. She has tried rest and sleep for the symptoms. The treatment provided no relief.     Review of Systems   Constitutional:  Positive for fatigue. Negative for fever and unexpected weight change.   HENT:  Negative for ear pain and sore throat.    Eyes:  Negative for pain and visual disturbance.   Respiratory:  Negative for cough and shortness of breath.    Cardiovascular:  Negative for chest pain and palpitations.   Gastrointestinal:  Negative for abdominal pain, diarrhea and vomiting.   Musculoskeletal:  Negative for arthralgias and myalgias.   Skin:  Negative for color change and rash.   Neurological:  Negative for dizziness and headaches.   Psychiatric/Behavioral:  Negative for dysphoric mood and sleep disturbance. The patient is not nervous/anxious.      Vitals:    03/30/23 0945   BP: 110/60   Pulse: 78   Temp: 97.5 °F (36.4 °C)       Objective:     Current Outpatient Medications   Medication Sig Dispense Refill    atorvastatin (LIPITOR) 80 MG tablet Take 1 tablet (80 mg total) by mouth every evening. 90 tablet 3    azelastine (ASTELIN) 137 mcg (0.1 %) nasal spray 2 sprays (274 mcg total) by Nasal route 2 (two) times daily. 30 mL 11    benzonatate (TESSALON) 200 MG capsule Take 1 capsule (200 mg total) by mouth 3 (three) times daily as needed for Cough. 30 capsule 0    bromocriptine (CYCLOSET) 0.8 mg Tab Take 0.8 mg by mouth every evening. Take 3 pills every evening      ezetimibe (ZETIA) 10 mg tablet Take 1 tablet (10 mg total) by mouth every  evening. 90 tablet 3    insulin (LANTUS SOLOSTAR U-100 INSULIN) glargine 100 units/mL SubQ pen 90 units SQ every AM. 75 mL 11    insulin aspart U-100 (NOVOLOG FLEXPEN U-100 INSULIN) 100 unit/mL (3 mL) InPn pen THREE TIMES DAILY with meals per SLIDING SCALE 45 mL 11    lisinopriL (PRINIVIL,ZESTRIL) 2.5 MG tablet Take 1 tablet (2.5 mg total) by mouth every evening. 90 tablet 3    multivitamin capsule Take 1 capsule by mouth once daily.      phenazopyridine (PYRIDIUM) 200 MG tablet Take 1 tablet (200 mg total) by mouth 3 (three) times daily as needed (burning). 15 tablet 0    timolol maleate 0.5% (TIMOPTIC) 0.5 % Drop Place 1 drop into both eyes 2 (two) times daily.      varenicline (CHANTIX STARTING MONTH BOX) 0.5 mg (11)- 1 mg (42) tablet Take one 0.5mg tab by mouth once daily X3 days,then increase to one 0.5mg tab twice daily X4 days,then increase to one 1mg tab twice daily 1 each 0    blood-glucose sensor (DEXCOM G6 SENSOR) Hannah Use as directed for continuous glucose monitoring. Change sensor every 10 days.      DEXCOM G6 SENSOR Hannah SMARTSIG:Topical Every 10 Days      DEXCOM G6 TRANSMITTER Hannah Use as directed to check glucose. Change every 90 days.      glucagon (BAQSIMI) 3 mg/actuation Spry SPRAY 1 SPRAY in one nostril FOR 1 dose      hyoscyamine (LEVSIN/SL) 0.125 mg Subl Place 1 tablet (0.125 mg total) under the tongue every 4 (four) hours as needed (bladder spasms). (Patient not taking: Reported on 3/30/2023) 40 tablet 0    levoFLOXacin (LEVAQUIN) 500 MG tablet Take 1 tablet (500 mg total) by mouth once daily. for 10 days (Patient not taking: Reported on 3/30/2023) 10 tablet 0    TRULICITY 1.5 mg/0.5 mL pen injector INJECT 1.5 MG INTO THE SKIN EVERY 7 DAYS (Patient not taking: Reported on 3/30/2023) 6 mL 1     No current facility-administered medications for this visit.       Physical Exam  Vitals and nursing note reviewed.   Constitutional:       General: She is not in acute distress.     Appearance: She is  well-developed. She is obese.   HENT:      Head: Normocephalic and atraumatic.   Eyes:      Pupils: Pupils are equal, round, and reactive to light.   Cardiovascular:      Rate and Rhythm: Normal rate and regular rhythm.   Pulmonary:      Effort: Pulmonary effort is normal.      Breath sounds: Normal breath sounds.   Musculoskeletal:         General: Normal range of motion.      Cervical back: Normal range of motion and neck supple.   Skin:     General: Skin is warm and dry.      Findings: No rash.   Neurological:      Mental Status: She is alert and oriented to person, place, and time.   Psychiatric:         Judgment: Judgment normal.       Assessment:       1. Chronic fatigue    2. Post-COVID chronic fatigue          Plan:   Chronic fatigue  -     CBC Auto Differential; Future; Expected date: 03/30/2023  -     Comprehensive Metabolic Panel; Future; Expected date: 03/30/2023  -     TSH; Future; Expected date: 03/30/2023  -     Vitamin B12; Future; Expected date: 03/30/2023  -     Iron and TIBC; Future; Expected date: 03/30/2023  -     Ferritin; Future; Expected date: 03/30/2023    Post-COVID chronic fatigue  -     CBC Auto Differential; Future; Expected date: 03/30/2023  -     Comprehensive Metabolic Panel; Future; Expected date: 03/30/2023  -     TSH; Future; Expected date: 03/30/2023  -     Vitamin B12; Future; Expected date: 03/30/2023  -     Iron and TIBC; Future; Expected date: 03/30/2023  -     Ferritin; Future; Expected date: 03/30/2023        No follow-ups on file.    There are no Patient Instructions on file for this visit.

## 2023-03-31 NOTE — PROGRESS NOTES
The a1c is still as high as it was 10 months ago. Please follow up with your endocrinologist for adjustments on this.  You are eligible for the digital medicine to help in this process also.  If you are interested, I can order it.  They would provide a glucometer and health  support.

## 2023-04-10 ENCOUNTER — PATIENT MESSAGE (OUTPATIENT)
Dept: UROLOGY | Facility: CLINIC | Age: 63
End: 2023-04-10
Payer: COMMERCIAL

## 2023-04-11 DIAGNOSIS — Z72.0 TOBACCO ABUSE DISORDER: Chronic | ICD-10-CM

## 2023-04-12 ENCOUNTER — PATIENT MESSAGE (OUTPATIENT)
Dept: UROLOGY | Facility: CLINIC | Age: 63
End: 2023-04-12
Payer: COMMERCIAL

## 2023-04-12 RX ORDER — VARENICLINE TARTRATE 0.5 (11)-1
KIT ORAL
Qty: 1 EACH | Refills: 0 | Status: SHIPPED | OUTPATIENT
Start: 2023-04-12 | End: 2023-11-01

## 2023-04-13 DIAGNOSIS — N39.0 ACUTE UTI: Primary | ICD-10-CM

## 2023-04-13 RX ORDER — CLINDAMYCIN HYDROCHLORIDE 300 MG/1
300 CAPSULE ORAL EVERY 8 HOURS
Qty: 21 CAPSULE | Refills: 0 | Status: SHIPPED | OUTPATIENT
Start: 2023-04-13 | End: 2023-11-01

## 2023-04-13 RX ORDER — PHENAZOPYRIDINE HYDROCHLORIDE 200 MG/1
200 TABLET, FILM COATED ORAL 3 TIMES DAILY PRN
Qty: 21 TABLET | Refills: 0 | Status: SHIPPED | OUTPATIENT
Start: 2023-04-13 | End: 2023-04-23

## 2023-04-16 ENCOUNTER — PATIENT MESSAGE (OUTPATIENT)
Dept: UROLOGY | Facility: CLINIC | Age: 63
End: 2023-04-16
Payer: COMMERCIAL

## 2023-04-16 ENCOUNTER — PATIENT MESSAGE (OUTPATIENT)
Dept: FAMILY MEDICINE | Facility: CLINIC | Age: 63
End: 2023-04-16
Payer: COMMERCIAL

## 2023-04-20 ENCOUNTER — PROCEDURE VISIT (OUTPATIENT)
Dept: UROLOGY | Facility: CLINIC | Age: 63
End: 2023-04-20
Payer: COMMERCIAL

## 2023-04-20 ENCOUNTER — HOSPITAL ENCOUNTER (OUTPATIENT)
Dept: RADIOLOGY | Facility: HOSPITAL | Age: 63
Discharge: HOME | End: 2023-04-20
Attending: UROLOGY
Payer: COMMERCIAL

## 2023-04-20 VITALS — WEIGHT: 293 LBS | BODY MASS INDEX: 44.41 KG/M2 | HEIGHT: 68 IN

## 2023-04-20 DIAGNOSIS — N39.0 ACUTE UTI: ICD-10-CM

## 2023-04-20 DIAGNOSIS — C67.2 MALIGNANT NEOPLASM OF LATERAL WALL OF URINARY BLADDER: Primary | ICD-10-CM

## 2023-04-20 DIAGNOSIS — C67.2 MALIGNANT NEOPLASM OF LATERAL WALL OF URINARY BLADDER: ICD-10-CM

## 2023-04-20 PROCEDURE — 52000 PR CYSTOURETHROSCOPY: ICD-10-PCS | Mod: S$GLB,,, | Performed by: UROLOGY

## 2023-04-20 PROCEDURE — 71046 XR CHEST PA AND LATERAL: ICD-10-PCS | Mod: 26,,, | Performed by: RADIOLOGY

## 2023-04-20 PROCEDURE — 71046 X-RAY EXAM CHEST 2 VIEWS: CPT | Mod: TC

## 2023-04-20 PROCEDURE — 71046 X-RAY EXAM CHEST 2 VIEWS: CPT | Mod: 26,,, | Performed by: RADIOLOGY

## 2023-04-20 PROCEDURE — 52000 CYSTOURETHROSCOPY: CPT | Mod: S$GLB,,, | Performed by: UROLOGY

## 2023-04-20 RX ORDER — CIPROFLOXACIN 500 MG/1
500 TABLET ORAL
Status: COMPLETED | OUTPATIENT
Start: 2023-04-20 | End: 2023-04-20

## 2023-04-20 RX ADMIN — CIPROFLOXACIN 500 MG: 500 TABLET ORAL at 02:04

## 2023-04-20 NOTE — PROCEDURES
Procedures  Chief Complaint:   Encounter Diagnoses   Name Primary?    Malignant neoplasm of lateral wall of urinary bladder Yes    Acute UTI        HPI:    4/20/23- here for cysto due to CT findings.  Admits to passing the stone in the ureter.    62-year-old female who comes in from [place] with a new diagnosis of possible bladder cancer.  She states this past summer she had difficulty voiding and a Tomas catheter was placed.  She did have a little bit hematuria afterwards, could have been related to the catheter placement.  She was doing well until on proximally [place] she began to have gross hematuria with no other significant symptoms.  This eventually cleared up on its own, CT scan demonstrated possible bladder masses and she was referred to an outside urologist.  Cystoscopy demonstrated significant tumor surrounding the right ureteral orifice, surgeries were attempted, but could not get scheduled due to other factors for preoperative clearance.  Patient is now cleared, but has decided to come to [facility] for treatment.  Patient is a current smoker, no other lower urinary tract symptoms.  No other urological or gynecological history, she has all of her female organs.  No evidence of true incontinence, no constipation.  She would an uncle with bladder cancer, no other family history of urological cancers or stones.    Allergies:  Penicillins    Medications:  has a current medication list which includes the following prescription(s): atorvastatin, azelastine, benzonatate, dexcom g6 sensor, cycloset, dexcom g6 sensor, dexcom g6 transmitter, ezetimibe, baqsimi, hyoscyamine, insulin, insulin aspart u-100, lisinopril, multivitamin, phenazopyridine, timolol maleate 0.5%, trulicity, and varenicline.    Review of Systems:  General: No fever, chills, fatigability, or weight loss.  Skin: No rashes, itching, or changes in color or texture of skin.  Chest: Denies EASON, cyanosis, wheezing, cough, and sputum  production.  Abdomen: Appetite fine. No weight loss. Denies diarrhea, abdominal pain, hematemesis, or blood in stool.  Musculoskeletal: No joint stiffness or swelling. Denies back pain.  : As above.  All other review of systems negative.    PMH:   has a past medical history of Asthma, Cigarette nicotine dependence without complication (2017), Colon polyp, DM (diabetes mellitus) type II uncontrolled with eye manifestation, Hyperplastic colon polyp (2015), Hypertension, Morbid obesity (10/08/2014), and Sleep apnea.    PSH:   has a past surgical history that includes  section; Eye surgery (Right); turbt (transurethral resection of bladder tumor) (N/A, 2023); and Cystoscopy with ureteroscopy, retrograde pyelography, and insertion of stent (Bilateral, 2023).    FamHx: family history includes Bladder Cancer in her paternal uncle; Coronary artery disease in her father; Diabetes in her father; Hypertension in her father; Lung cancer in her father; Lymphoma in her paternal uncle; Pancreatic cancer in her mother; Stroke in her father.    SocHx:  reports that she has been smoking cigarettes. She has a 27.75 pack-year smoking history. She has never used smokeless tobacco. She reports that she does not drink alcohol and does not use drugs.      Physical Exam:  There were no vitals filed for this visit.    General: A&Ox3, no apparent distress, no deformities  Neck: No masses, normal ROM  Lungs: normal inspiration, no use of accessory muscles  Heart: normal pulse, no arrhythmias  Abdomen: Soft, NT, ND, no masses, no hernias, no hepatosplenomegaly  Skin: The skin is warm and dry. No jaundice.  Ext: No c/c/e.  : - No pelvic floor prolapse.  Normal introitus, no urethral abnomralities. No Perineal abnormalities.    Labs/Studies:   CT stone protocol right hydro, 4 mm right ureteral stone, 1.3 cm renal stone, 3 cm bladder mass   CT urogram right bladder wall tumor     Procedure: Diagnostic  Cystoscopy    Procedure in Detail: After proper consents were obtained, the patient was prepped and draped in normal sterile fashion for diagnostic cystoscopy. The flexible cystoscope was then introduced into the urethra, and advanced into the bladder under direct vision. The urethral mucosa appeared normal, and no strictures were noted. The sphincter appeared to be normal.  The bladder neck was normal. Inspection of the interior of the bladder was then carried out. The trigone was unremarkable, with no mucosal lesions. The ureteral orifices were normal in position and configuration. Systematic inspection of the mucosa of the bladder was then carried out, rotating the cystoscope so that all areas of the left and right lateral walls, the dome of the bladder, and the posterior wall were all visualized. The cystoscope was then advanced further into the bladder, and maximum deflection of the scope was performed so that the bladder neck could be inspected. Stones just lateral to the UO on the left. The cystoscope was then removed, and the procedure terminated.     Findings: stones on the bladder just lateral to the UO on the left and the orifice is open      Impression/Plan:     1. Bladder cancer-  pTa high TURBT, retros normal  1/31/23    Patient admits to passing the stone, but appears to have a stone adhered to the bladder.  Will need to resect and possible resect a tumor if it is underlying the stone.  Therefore will proceed with a cystoscopy and TURBT.  Will also do ureteroscopy to ensure ureter is open and retrograde on the right.  Of note the orifice is open on cystoscopy today.    Patient understands the risks, benefits and alternatives of the above-stated procedure.  These include but not limited to damage to the surrounding structures including the urethra, prostate, ureters and bladder.  Risk of the need for stent placement, perforation requiring open procedures, Tomas catheterization at the conclusion of the  procedure.  Risk of recurrence or need for further surgeries.  Risk of pain, hematuria, infections.  Risk of heart attack, stroke, death, DVT and PE.  Patient understands he may require hospitalization post procedure, understanding of all the above he has elected to pursue.    2. Nephrolithiasis- please see above.  Monitor the renal stones.

## 2023-05-01 ENCOUNTER — TELEPHONE (OUTPATIENT)
Dept: PREADMISSION TESTING | Facility: HOSPITAL | Age: 63
End: 2023-05-01
Payer: COMMERCIAL

## 2023-05-01 ENCOUNTER — PATIENT MESSAGE (OUTPATIENT)
Dept: UROLOGY | Facility: CLINIC | Age: 63
End: 2023-05-01
Payer: COMMERCIAL

## 2023-05-01 ENCOUNTER — ANESTHESIA EVENT (OUTPATIENT)
Dept: SURGERY | Facility: HOSPITAL | Age: 63
End: 2023-05-01
Payer: COMMERCIAL

## 2023-05-01 DIAGNOSIS — C67.2 MALIGNANT NEOPLASM OF LATERAL WALL OF URINARY BLADDER: Primary | ICD-10-CM

## 2023-05-01 NOTE — TELEPHONE ENCOUNTER
Pre op instructions reviewed with Pt ,verbalized understanding.    To confirm, Surgery is scheduled on 5/02/23. We will call you late afternoon the business day prior to surgery with your arrival time.    *Please report to the [facility] Lobby (1st Floor) located off of [facility] (2nd Entrance/Building on the left, in front of the flag pole).  Address: [address] [place] [address] [place]. [id number]        INSTRUCTIONS IMPORTANT!!!  Do Not Eat, Drink, or Smoke after 12 midnight unless instructed otherwise by your Surgeon. OK to brush teeth, no gum, candy or mints!      *Take Only these medications with a small sip of water Morning of Surgery:  Eye Drops      ____  HOLD all vitamins, herbal supplements, aspirin products & NSAIDS 7 days prior to surgery, as these can thin the blood.  ____  NO Acrylic/fake nails or nail polish worn day of surgery (specifically hand/arm & foot surgeries).  ____  NO powder, lotions, deodorants, oils or cream on body.  ____  Remove all jewelry & piercings prior to surgery.  ____  Remove Dentures, Hearing Aids & Contact Lens prior to surgery.  ____  Bring photo ID and insurance information to hospital (Leave Valuables at Home).  ____  If going home the same day, arrange for a ride home. You will not be able to drive for 24 hrs if Anesthesia was used.   ____  Females (ages 11-60): may need to give a urine sample the morning of surgery; please see Pre op Nurse prior to using the restroom.  ____  Males: Stop ED medications (Viagra, Cialis) 24 hrs prior to surgery.  ____  Wear clean, loose fitting clothing to allow for dressings/ bandages.            Diabetic Patients: If you take diabetic medication, do NOT take morning of surgery unless instructed by Doctor. Metformin to be stopped 24 hrs prior to surgery time. DO NOT take long-acting insulin the evening before surgery. Blood sugars will be checked in pre-op by Nurse.    Bathing Instructions:    -Shower with anti-bacterial Soap  (Hibiclens or Dial) the night before surgery and the morning of.   -Do not use Hibiclens on your face or genitals.   -Apply clean clothes after shower.  -Do not shave your face or body 2 days prior to surgery unless instructed otherwise by your Surgeon.  -Do not shave pubic hair 7 days prior to surgery (gyn pt's).    [facility] Visitor/Ride Policy:  Only 2 adults allowed (over the age of 18) to accompany you to the Hospital. You Must have a ride home from a responsible adult that you know and trust. Medical Transport, Uber or Lyft can only be used if patient has a responsible adult to accompany them during ride home.    Discharge Instructions: You will receive Post-op/Discharge instructions by your Discharge Nurse prior to going home. Please call your Surgeon's office with any post-surgery questions/concerns @ [phone].    *If you are running late or have questions the morning of surgery, please call the Surgery Dept @ [phone]  *Insurance/ Financial Questions, please call [phone]    Thank you,  -[facility] Pre Admit Testing Nurse  [phone] or [phone]  M-F 7:30 am-4:30 pm (Closed Major Holidays)

## 2023-05-01 NOTE — TELEPHONE ENCOUNTER
Notified Dr Clark of patient's recent urinalysis. Dr. Clark's response: okay to proceed with surgery. Patient scheduled for surgery on 5/02/23.

## 2023-05-01 NOTE — TELEPHONE ENCOUNTER
Called and spoke with Pt about the following:     Please arrive to [facility] ([facility] at 5:30 am on 5/02/23 for your scheduled procedure.  Address: [address] [place] [address] [place]. [address] (2nd Building on left, 1st Floor Lobby)  >>>NO eating or drinking after midnight unless instructed otherwise by your Surgeon<<<

## 2023-05-02 ENCOUNTER — TELEPHONE (OUTPATIENT)
Dept: UROLOGY | Facility: CLINIC | Age: 63
End: 2023-05-02
Payer: COMMERCIAL

## 2023-05-02 ENCOUNTER — HOSPITAL ENCOUNTER (OUTPATIENT)
Facility: HOSPITAL | Age: 63
Discharge: HOME | End: 2023-05-02
Attending: UROLOGY | Admitting: UROLOGY
Payer: COMMERCIAL

## 2023-05-02 ENCOUNTER — ANESTHESIA (OUTPATIENT)
Dept: SURGERY | Facility: HOSPITAL | Age: 63
End: 2023-05-02
Payer: COMMERCIAL

## 2023-05-02 VITALS
HEART RATE: 72 BPM | SYSTOLIC BLOOD PRESSURE: 139 MMHG | DIASTOLIC BLOOD PRESSURE: 65 MMHG | TEMPERATURE: 97 F | BODY MASS INDEX: 44.41 KG/M2 | HEIGHT: 68 IN | WEIGHT: 293 LBS | OXYGEN SATURATION: 96 % | RESPIRATION RATE: 14 BRPM

## 2023-05-02 DIAGNOSIS — C67.2 MALIGNANT NEOPLASM OF LATERAL WALL OF URINARY BLADDER: ICD-10-CM

## 2023-05-02 LAB — POCT GLUCOSE: 102 MG/DL (ref 70–110)

## 2023-05-02 PROCEDURE — 71000033 HC RECOVERY, INTIAL HOUR: Performed by: UROLOGY

## 2023-05-02 PROCEDURE — 52224 PR CYSTOURETHROSCOPY,FULGUR <0.5 CM LESN: ICD-10-PCS | Mod: ,,, | Performed by: UROLOGY

## 2023-05-02 PROCEDURE — 88305 TISSUE EXAM BY PATHOLOGIST: CPT | Mod: 26,,, | Performed by: PATHOLOGY

## 2023-05-02 PROCEDURE — 88305 TISSUE EXAM BY PATHOLOGIST: ICD-10-PCS | Mod: 26,,, | Performed by: PATHOLOGY

## 2023-05-02 PROCEDURE — 37000008 HC ANESTHESIA 1ST 15 MINUTES: Performed by: UROLOGY

## 2023-05-02 PROCEDURE — 27201423 OPTIME MED/SURG SUP & DEVICES STERILE SUPPLY: Performed by: UROLOGY

## 2023-05-02 PROCEDURE — 88304 TISSUE EXAM BY PATHOLOGIST: CPT | Performed by: PATHOLOGY

## 2023-05-02 PROCEDURE — 37000009 HC ANESTHESIA EA ADD 15 MINS: Performed by: UROLOGY

## 2023-05-02 PROCEDURE — 63600175 PHARM REV CODE 636 W HCPCS: Performed by: ANESTHESIOLOGY

## 2023-05-02 PROCEDURE — 52224 CYSTOSCOPY AND TREATMENT: CPT | Mod: ,,, | Performed by: UROLOGY

## 2023-05-02 PROCEDURE — 25000003 PHARM REV CODE 250: Performed by: UROLOGY

## 2023-05-02 PROCEDURE — 36000706: Performed by: UROLOGY

## 2023-05-02 PROCEDURE — 71000015 HC POSTOP RECOV 1ST HR: Performed by: UROLOGY

## 2023-05-02 PROCEDURE — 25000003 PHARM REV CODE 250: Performed by: NURSE ANESTHETIST, CERTIFIED REGISTERED

## 2023-05-02 PROCEDURE — 63600175 PHARM REV CODE 636 W HCPCS: Performed by: NURSE ANESTHETIST, CERTIFIED REGISTERED

## 2023-05-02 PROCEDURE — 36000707: Performed by: UROLOGY

## 2023-05-02 RX ORDER — LIDOCAINE HYDROCHLORIDE 20 MG/ML
INJECTION, SOLUTION EPIDURAL; INFILTRATION; INTRACAUDAL; PERINEURAL
Status: DISCONTINUED | OUTPATIENT
Start: 2023-05-02 | End: 2023-05-02

## 2023-05-02 RX ORDER — MIDAZOLAM HYDROCHLORIDE 1 MG/ML
INJECTION INTRAMUSCULAR; INTRAVENOUS
Status: DISCONTINUED | OUTPATIENT
Start: 2023-05-02 | End: 2023-05-02

## 2023-05-02 RX ORDER — HYDROMORPHONE HYDROCHLORIDE 2 MG/ML
0.2 INJECTION, SOLUTION INTRAMUSCULAR; INTRAVENOUS; SUBCUTANEOUS EVERY 5 MIN PRN
Status: DISCONTINUED | OUTPATIENT
Start: 2023-05-02 | End: 2023-05-02 | Stop reason: HOSPADM

## 2023-05-02 RX ORDER — CLINDAMYCIN PHOSPHATE 900 MG/50ML
900 INJECTION, SOLUTION INTRAVENOUS
Status: COMPLETED | OUTPATIENT
Start: 2023-05-02 | End: 2023-05-02

## 2023-05-02 RX ORDER — PROPOFOL 10 MG/ML
VIAL (ML) INTRAVENOUS
Status: DISCONTINUED | OUTPATIENT
Start: 2023-05-02 | End: 2023-05-02

## 2023-05-02 RX ORDER — PHENAZOPYRIDINE HYDROCHLORIDE 200 MG/1
200 TABLET, FILM COATED ORAL 3 TIMES DAILY PRN
Qty: 15 TABLET | Refills: 0 | Status: SHIPPED | OUTPATIENT
Start: 2023-05-02 | End: 2023-11-01

## 2023-05-02 RX ORDER — SUCCINYLCHOLINE CHLORIDE 20 MG/ML
INJECTION INTRAMUSCULAR; INTRAVENOUS
Status: DISCONTINUED | OUTPATIENT
Start: 2023-05-02 | End: 2023-05-02

## 2023-05-02 RX ORDER — ONDANSETRON 2 MG/ML
4 INJECTION INTRAMUSCULAR; INTRAVENOUS DAILY PRN
Status: DISCONTINUED | OUTPATIENT
Start: 2023-05-02 | End: 2023-05-02 | Stop reason: HOSPADM

## 2023-05-02 RX ORDER — DEXAMETHASONE SODIUM PHOSPHATE 4 MG/ML
INJECTION, SOLUTION INTRA-ARTICULAR; INTRALESIONAL; INTRAMUSCULAR; INTRAVENOUS; SOFT TISSUE
Status: DISCONTINUED | OUTPATIENT
Start: 2023-05-02 | End: 2023-05-02

## 2023-05-02 RX ORDER — OXYCODONE AND ACETAMINOPHEN 5; 325 MG/1; MG/1
1 TABLET ORAL
Status: DISCONTINUED | OUTPATIENT
Start: 2023-05-02 | End: 2023-05-02 | Stop reason: HOSPADM

## 2023-05-02 RX ORDER — ROCURONIUM BROMIDE 10 MG/ML
INJECTION, SOLUTION INTRAVENOUS
Status: DISCONTINUED | OUTPATIENT
Start: 2023-05-02 | End: 2023-05-02

## 2023-05-02 RX ORDER — LEVOFLOXACIN 500 MG/1
500 TABLET, FILM COATED ORAL DAILY
Qty: 7 TABLET | Refills: 0 | Status: SHIPPED | OUTPATIENT
Start: 2023-05-02 | End: 2023-05-12

## 2023-05-02 RX ORDER — ONDANSETRON 2 MG/ML
INJECTION INTRAMUSCULAR; INTRAVENOUS
Status: DISCONTINUED | OUTPATIENT
Start: 2023-05-02 | End: 2023-05-02

## 2023-05-02 RX ORDER — KETOROLAC TROMETHAMINE 30 MG/ML
15 INJECTION, SOLUTION INTRAMUSCULAR; INTRAVENOUS EVERY 8 HOURS PRN
Status: DISCONTINUED | OUTPATIENT
Start: 2023-05-02 | End: 2023-05-02 | Stop reason: HOSPADM

## 2023-05-02 RX ORDER — OXYCODONE AND ACETAMINOPHEN 5; 325 MG/1; MG/1
1 TABLET ORAL EVERY 4 HOURS PRN
Qty: 10 TABLET | Refills: 0 | Status: SHIPPED | OUTPATIENT
Start: 2023-05-02 | End: 2023-11-01

## 2023-05-02 RX ORDER — EPHEDRINE SULFATE 50 MG/ML
INJECTION, SOLUTION INTRAVENOUS
Status: DISCONTINUED | OUTPATIENT
Start: 2023-05-02 | End: 2023-05-02

## 2023-05-02 RX ADMIN — ROCURONIUM BROMIDE 25 MG: 10 SOLUTION INTRAVENOUS at 07:05

## 2023-05-02 RX ADMIN — DEXAMETHASONE SODIUM PHOSPHATE 8 MG: 4 INJECTION, SOLUTION INTRA-ARTICULAR; INTRALESIONAL; INTRAMUSCULAR; INTRAVENOUS; SOFT TISSUE at 07:05

## 2023-05-02 RX ADMIN — SODIUM CHLORIDE, SODIUM LACTATE, POTASSIUM CHLORIDE, AND CALCIUM CHLORIDE: .6; .31; .03; .02 INJECTION, SOLUTION INTRAVENOUS at 06:05

## 2023-05-02 RX ADMIN — SUGAMMADEX 200 MG: 100 INJECTION, SOLUTION INTRAVENOUS at 07:05

## 2023-05-02 RX ADMIN — LIDOCAINE HYDROCHLORIDE 100 MG: 20 INJECTION, SOLUTION INTRAVENOUS at 07:05

## 2023-05-02 RX ADMIN — MIDAZOLAM HYDROCHLORIDE 2 MG: 1 INJECTION INTRAMUSCULAR; INTRAVENOUS at 06:05

## 2023-05-02 RX ADMIN — SUCCINYLCHOLINE CHLORIDE 160 MG: 20 INJECTION, SOLUTION INTRAMUSCULAR; INTRAVENOUS; PARENTERAL at 07:05

## 2023-05-02 RX ADMIN — EPHEDRINE SULFATE 10 MG: 50 INJECTION INTRAVENOUS at 07:05

## 2023-05-02 RX ADMIN — ONDANSETRON 4 MG: 2 INJECTION INTRAMUSCULAR; INTRAVENOUS at 07:05

## 2023-05-02 RX ADMIN — HYDROMORPHONE HYDROCHLORIDE 0.2 MG: 2 INJECTION INTRAMUSCULAR; INTRAVENOUS; SUBCUTANEOUS at 08:05

## 2023-05-02 RX ADMIN — ROCURONIUM BROMIDE 5 MG: 10 SOLUTION INTRAVENOUS at 07:05

## 2023-05-02 RX ADMIN — CLINDAMYCIN IN 5 PERCENT DEXTROSE 900 MG: 18 INJECTION, SOLUTION INTRAVENOUS at 07:05

## 2023-05-02 RX ADMIN — KETOROLAC TROMETHAMINE 15 MG: 30 INJECTION, SOLUTION INTRAMUSCULAR; INTRAVENOUS at 08:05

## 2023-05-02 RX ADMIN — PROPOFOL 150 MG: 10 INJECTION, EMULSION INTRAVENOUS at 07:05

## 2023-05-02 NOTE — ANESTHESIA PREPROCEDURE EVALUATION
05/02/2023  Chelsie Garrett is a 62 y.o., female.    Patient Active Problem List   Diagnosis    DM (diabetes mellitus) type II uncontrolled with eye manifestation    Hyperlipidemia associated with type 2 diabetes mellitus    Peripheral neuropathy    Morbid obesity    Tobacco abuse disorder    Hyperplastic colon polyp    Leg weakness    Insulin long-term use    Moderate nonproliferative diabetic retinopathy with macular edema associated with type 2 diabetes mellitus    Glaucoma    Trigger finger of left thumb    Left wrist tendonitis    Trigger thumb of left hand    Left hand pain    Thumb joint stiffness    Cigarette nicotine dependence without complication    Plantar fasciitis    Actinic keratosis    Type II diabetes mellitus with neurological manifestations    Pain in right foot - Right Foot    Contusion of right heel    Smoker    Proteinuria    Preop cardiovascular exam    Bladder tumor    Obstructive sleep apnea    Restrictive lung disease    Acute UTI    Malignant neoplasm of lateral wall of urinary bladder       Pre-op Assessment    I have reviewed the Patient Summary Reports.    I have reviewed the NPO Status.   I have reviewed the Medications.     Review of Systems  Anesthesia Hx:  No problems with previous Anesthesia    Social:  Smoker    Hematology/Oncology:  Hematology Normal        Cardiovascular:   Hypertension    Pulmonary:   Asthma Sleep Apnea    Renal/:  Renal/ Normal     Hepatic/GI:  Hepatic/GI Normal    Neurological:  Neurology Normal    Endocrine:   Diabetes  Morbid Obesity / BMI > 40      Physical Exam  General: Well nourished    Airway:  Mallampati: II   Mouth Opening: Normal  TM Distance: Normal  Neck ROM: Normal ROM    Dental:  Intact        Anesthesia Plan  Type of Anesthesia, risks & benefits discussed:    Anesthesia Type: Gen ETT  Intra-op Monitoring  Plan: Standard ASA Monitors  Post Op Pain Control Plan: multimodal analgesia  Induction:  IV  Airway Plan: , Post-Induction  Informed Consent: Informed consent signed with the Patient and all parties understand the risks and agree with anesthesia plan.  All questions answered.   ASA Score: 3    Ready For Surgery From Anesthesia Perspective.     .      Chemistry        Component Value Date/Time     03/30/2023 1045    K 5.0 03/30/2023 1045     03/30/2023 1045    CO2 28 03/30/2023 1045    BUN 15 03/30/2023 1045    CREATININE 0.7 03/30/2023 1045     (H) 03/30/2023 1045        Component Value Date/Time    CALCIUM 9.7 03/30/2023 1045    ALKPHOS 83 03/30/2023 1045    AST 15 03/30/2023 1045    ALT 15 03/30/2023 1045    BILITOT 0.4 03/30/2023 1045    ESTGFRAFRICA >60.0 06/01/2022 0801    ESTGFRAFRICA >60.0 06/01/2022 0801    EGFRNONAA >60.0 06/01/2022 0801    EGFRNONAA >60.0 06/01/2022 0801        Lab Results   Component Value Date    WBC 7.14 03/30/2023    HGB 14.7 03/30/2023    HCT 45.2 03/30/2023    MCV 94 03/30/2023     03/30/2023

## 2023-05-02 NOTE — H&P
Chief Complaint:        Encounter Diagnoses   Name Primary?    Malignant neoplasm of lateral wall of urinary bladder Yes    Acute UTI           HPI:    4/20/23- here for cysto due to CT findings.  Admits to passing the stone in the ureter.     62-year-old female who comes in from [place] with a new diagnosis of possible bladder cancer.  She states this past summer she had difficulty voiding and a Tomas catheter was placed.  She did have a little bit hematuria afterwards, could have been related to the catheter placement.  She was doing well until on proximally [place] she began to have gross hematuria with no other significant symptoms.  This eventually cleared up on its own, CT scan demonstrated possible bladder masses and she was referred to an outside urologist.  Cystoscopy demonstrated significant tumor surrounding the right ureteral orifice, surgeries were attempted, but could not get scheduled due to other factors for preoperative clearance.  Patient is now cleared, but has decided to come to [facility] for treatment.  Patient is a current smoker, no other lower urinary tract symptoms.  No other urological or gynecological history, she has all of her female organs.  No evidence of true incontinence, no constipation.  She would an uncle with bladder cancer, no other family history of urological cancers or stones.     Allergies:  Penicillins     Medications:  has a current medication list which includes the following prescription(s): atorvastatin, azelastine, benzonatate, dexcom g6 sensor, cycloset, dexcom g6 sensor, dexcom g6 transmitter, ezetimibe, baqsimi, hyoscyamine, insulin, insulin aspart u-100, lisinopril, multivitamin, phenazopyridine, timolol maleate 0.5%, trulicity, and varenicline.     Review of Systems:  General: No fever, chills, fatigability, or weight loss.  Skin: No rashes, itching, or changes in color or texture of skin.  Chest: Denies EASON, cyanosis, wheezing, cough, and sputum  production.  Abdomen: Appetite fine. No weight loss. Denies diarrhea, abdominal pain, hematemesis, or blood in stool.  Musculoskeletal: No joint stiffness or swelling. Denies back pain.  : As above.  All other review of systems negative.     PMH:   has a past medical history of Asthma, Cigarette nicotine dependence without complication (2017), Colon polyp, DM (diabetes mellitus) type II uncontrolled with eye manifestation, Hyperplastic colon polyp (2015), Hypertension, Morbid obesity (10/08/2014), and Sleep apnea.     PSH:   has a past surgical history that includes  section; Eye surgery (Right); turbt (transurethral resection of bladder tumor) (N/A, 2023); and Cystoscopy with ureteroscopy, retrograde pyelography, and insertion of stent (Bilateral, 2023).     FamHx: family history includes Bladder Cancer in her paternal uncle; Coronary artery disease in her father; Diabetes in her father; Hypertension in her father; Lung cancer in her father; Lymphoma in her paternal uncle; Pancreatic cancer in her mother; Stroke in her father.     SocHx:  reports that she has been smoking cigarettes. She has a 27.75 pack-year smoking history. She has never used smokeless tobacco. She reports that she does not drink alcohol and does not use drugs.       Physical Exam:  There were no vitals filed for this visit.     General: A&Ox3, no apparent distress, no deformities  Neck: No masses, normal ROM  Lungs: normal inspiration, no use of accessory muscles  Heart: normal pulse, no arrhythmias  Abdomen: Soft, NT, ND, no masses, no hernias, no hepatosplenomegaly  Skin: The skin is warm and dry. No jaundice.  Ext: No c/c/e.  : - No pelvic floor prolapse.  Normal introitus, no urethral abnomralities. No Perineal abnormalities.     Labs/Studies:   CT stone protocol right hydro, 4 mm right ureteral stone, 1.3 cm renal stone, 3 cm bladder mass   CT urogram right bladder wall tumor      Procedure:  Diagnostic Cystoscopy     Procedure in Detail: After proper consents were obtained, the patient was prepped and draped in normal sterile fashion for diagnostic cystoscopy. The flexible cystoscope was then introduced into the urethra, and advanced into the bladder under direct vision. The urethral mucosa appeared normal, and no strictures were noted. The sphincter appeared to be normal.  The bladder neck was normal. Inspection of the interior of the bladder was then carried out. The trigone was unremarkable, with no mucosal lesions. The ureteral orifices were normal in position and configuration. Systematic inspection of the mucosa of the bladder was then carried out, rotating the cystoscope so that all areas of the left and right lateral walls, the dome of the bladder, and the posterior wall were all visualized. The cystoscope was then advanced further into the bladder, and maximum deflection of the scope was performed so that the bladder neck could be inspected. Stones just lateral to the UO on the left. The cystoscope was then removed, and the procedure terminated.      Findings: stones on the bladder just lateral to the UO on the left and the orifice is open        Impression/Plan:      1. Bladder cancer-  pTa high TURBT, retros normal  1/31/23     Patient admits to passing the stone, but appears to have a stone adhered to the bladder.  Will need to resect and possible resect a tumor if it is underlying the stone.  Therefore will proceed with a cystoscopy and TURBT.  Will also do ureteroscopy to ensure ureter is open and retrograde on the right.  Of note the orifice is open on cystoscopy today.     Patient understands the risks, benefits and alternatives of the above-stated procedure.  These include but not limited to damage to the surrounding structures including the urethra, prostate, ureters and bladder.  Risk of the need for stent placement, perforation requiring open procedures, Tomas catheterization at the  conclusion of the procedure.  Risk of recurrence or need for further surgeries.  Risk of pain, hematuria, infections.  Risk of heart attack, stroke, death, DVT and PE.  Patient understands he may require hospitalization post procedure, understanding of all the above he has elected to pursue.     2. Nephrolithiasis- please see above.  Monitor the renal stones.

## 2023-05-02 NOTE — PLAN OF CARE
Gave home care instructions to patient and , verbalized understanding.   All questions answered to the satisfaction of both.     Statement Selected

## 2023-05-02 NOTE — ANESTHESIA POSTPROCEDURE EVALUATION
Anesthesia Post Evaluation    Patient: Chelsie Garrett    Procedure(s) Performed: Procedure(s) (LRB):  CYSTOLITHOLAPAXY (N/A)    Final Anesthesia Type: general      Patient location during evaluation: PACU  Patient participation: Yes- Able to Participate  Level of consciousness: awake and alert  Post-procedure vital signs: reviewed and stable  Pain management: adequate  Airway patency: patent  DELANEY mitigation strategies: Verification of full reversal of neuromuscular block  PONV status at discharge: No PONV  Anesthetic complications: no      Cardiovascular status: hemodynamically stable  Respiratory status: spontaneous ventilation  Hydration status: euvolemic  Follow-up not needed.          Vitals Value Taken Time   /65 05/02/23 0820   Temp 36.3 °C (97.4 °F) 05/02/23 0820   Pulse 70 05/02/23 0822   Resp 34 05/02/23 0822   SpO2 95 % 05/02/23 0821   Vitals shown include unvalidated device data.      Event Time   Out of Recovery 08:25:54         Pain/Alissa Score: Pain Rating Prior to Med Admin: 5 (5/2/2023  8:10 AM)  Alissa Score: 10 (5/2/2023  8:25 AM)

## 2023-05-02 NOTE — TELEPHONE ENCOUNTER
----- Message from Marlon Clark MD sent at 5/2/2023  8:04 AM CDT -----  Three months with cystoscopy in the clinic

## 2023-05-02 NOTE — OP NOTE
Date of Procedure: 05/02/2023    PREOP DIAGNOSIS:  Bladder cancer.    POSTOP DIAGNOSIS:  Bladder stone.    PROCEDURES:   Vesicolithalopaxy    SURGEON:  Marlon Clark M.D.    Assistants: None    Specimen: Bladder stone    ANESTHESIA:  General endotracheal.    BLOOD LOSS:  None.    FINDINGS:  Calcifications seen overlying appears to be scar tissue and not a tumor.  Ureteral orifice demonstrates urine passing without obstruction.      PROCEDURE IN DETAIL:  Patient was brought to the operative suite and placed under general anesthesia and positioned into the dorsal lithotomy position.  After being sterilely prepped and draped a 24 Citizen of [place] resectoscope was inserted into a normal urethra.  Bladder was examined, calcification was identified overlying the right ureteral orifice, not involving the orifice.  Left ureteral orifice was normal in size, shape, caliber and location.  With the resectoscope were able to scrape the tissue off of the underlying bladder mucosa and irrigate through the scope, being sent off for pathological evaluation to confirm no evidence of tumor.  Of note no significant tumor on the mucosa, ureteral orifice demonstrates urine coming from the orifice no evidence of obstruction.  No other evidence of tumors within the bladder identified.  At this point hemostasis was meticulously assessed and maintained, bladder was drained.  Patient was transferred to the PACU in stable condition.  Patient will return in 3 months for surveillance cystoscopy, unless the pathology returns as abnormal and will need to follow up sooner.    COMPLICATIONS: None

## 2023-05-02 NOTE — PROGRESS NOTES
Chief Complaint:        Encounter Diagnoses   Name Primary?    Malignant neoplasm of lateral wall of urinary bladder Yes    Acute UTI           HPI:    4/20/23- here for cysto due to CT findings.  Admits to passing the stone in the ureter.     62-year-old female who comes in from [place] with a new diagnosis of possible bladder cancer.  She states this past summer she had difficulty voiding and a Tomas catheter was placed.  She did have a little bit hematuria afterwards, could have been related to the catheter placement.  She was doing well until on proximally [place] she began to have gross hematuria with no other significant symptoms.  This eventually cleared up on its own, CT scan demonstrated possible bladder masses and she was referred to an outside urologist.  Cystoscopy demonstrated significant tumor surrounding the right ureteral orifice, surgeries were attempted, but could not get scheduled due to other factors for preoperative clearance.  Patient is now cleared, but has decided to come to [facility] for treatment.  Patient is a current smoker, no other lower urinary tract symptoms.  No other urological or gynecological history, she has all of her female organs.  No evidence of true incontinence, no constipation.  She would an uncle with bladder cancer, no other family history of urological cancers or stones.     Allergies:  Penicillins     Medications:  has a current medication list which includes the following prescription(s): atorvastatin, azelastine, benzonatate, dexcom g6 sensor, cycloset, dexcom g6 sensor, dexcom g6 transmitter, ezetimibe, baqsimi, hyoscyamine, insulin, insulin aspart u-100, lisinopril, multivitamin, phenazopyridine, timolol maleate 0.5%, trulicity, and varenicline.     Review of Systems:  General: No fever, chills, fatigability, or weight loss.  Skin: No rashes, itching, or changes in color or texture of skin.  Chest: Denies EASON, cyanosis, wheezing, cough, and sputum  production.  Abdomen: Appetite fine. No weight loss. Denies diarrhea, abdominal pain, hematemesis, or blood in stool.  Musculoskeletal: No joint stiffness or swelling. Denies back pain.  : As above.  All other review of systems negative.     PMH:   has a past medical history of Asthma, Cigarette nicotine dependence without complication (2017), Colon polyp, DM (diabetes mellitus) type II uncontrolled with eye manifestation, Hyperplastic colon polyp (2015), Hypertension, Morbid obesity (10/08/2014), and Sleep apnea.     PSH:   has a past surgical history that includes  section; Eye surgery (Right); turbt (transurethral resection of bladder tumor) (N/A, 2023); and Cystoscopy with ureteroscopy, retrograde pyelography, and insertion of stent (Bilateral, 2023).     FamHx: family history includes Bladder Cancer in her paternal uncle; Coronary artery disease in her father; Diabetes in her father; Hypertension in her father; Lung cancer in her father; Lymphoma in her paternal uncle; Pancreatic cancer in her mother; Stroke in her father.     SocHx:  reports that she has been smoking cigarettes. She has a 27.75 pack-year smoking history. She has never used smokeless tobacco. She reports that she does not drink alcohol and does not use drugs.       Physical Exam:  There were no vitals filed for this visit.     General: A&Ox3, no apparent distress, no deformities  Neck: No masses, normal ROM  Lungs: normal inspiration, no use of accessory muscles  Heart: normal pulse, no arrhythmias  Abdomen: Soft, NT, ND, no masses, no hernias, no hepatosplenomegaly  Skin: The skin is warm and dry. No jaundice.  Ext: No c/c/e.  : - No pelvic floor prolapse.  Normal introitus, no urethral abnomralities. No Perineal abnormalities.     Labs/Studies:   CT stone protocol right hydro, 4 mm right ureteral stone, 1.3 cm renal stone, 3 cm bladder mass   CT urogram right bladder wall tumor      Procedure:  Diagnostic Cystoscopy     Procedure in Detail: After proper consents were obtained, the patient was prepped and draped in normal sterile fashion for diagnostic cystoscopy. The flexible cystoscope was then introduced into the urethra, and advanced into the bladder under direct vision. The urethral mucosa appeared normal, and no strictures were noted. The sphincter appeared to be normal.  The bladder neck was normal. Inspection of the interior of the bladder was then carried out. The trigone was unremarkable, with no mucosal lesions. The ureteral orifices were normal in position and configuration. Systematic inspection of the mucosa of the bladder was then carried out, rotating the cystoscope so that all areas of the left and right lateral walls, the dome of the bladder, and the posterior wall were all visualized. The cystoscope was then advanced further into the bladder, and maximum deflection of the scope was performed so that the bladder neck could be inspected. Stones just lateral to the UO on the left. The cystoscope was then removed, and the procedure terminated.      Findings: stones on the bladder just lateral to the UO on the left and the orifice is open        Impression/Plan:      1. Bladder cancer-  pTa high TURBT, retros normal  1/31/23     Patient admits to passing the stone, but appears to have a stone adhered to the bladder.  Will need to resect and possible resect a tumor if it is underlying the stone.  Therefore will proceed with a cystoscopy and TURBT.  Will also do ureteroscopy to ensure ureter is open and retrograde on the right.  Of note the orifice is open on cystoscopy today.     Patient understands the risks, benefits and alternatives of the above-stated procedure.  These include but not limited to damage to the surrounding structures including the urethra, prostate, ureters and bladder.  Risk of the need for stent placement, perforation requiring open procedures, Tomas catheterization at the  conclusion of the procedure.  Risk of recurrence or need for further surgeries.  Risk of pain, hematuria, infections.  Risk of heart attack, stroke, death, DVT and PE.  Patient understands he may require hospitalization post procedure, understanding of all the above he has elected to pursue.     2. Nephrolithiasis- please see above.  Monitor the renal stones.

## 2023-05-02 NOTE — TRANSFER OF CARE
"Anesthesia Transfer of Care Note    Patient: Chelsie Garrett    Procedure(s) Performed: Procedure(s) (LRB):  CYSTOLITHOLAPAXY (N/A)    Patient location: PACU    Anesthesia Type: general    Transport from OR: Transported from OR on room air with adequate spontaneous ventilation    Post pain: adequate analgesia    Post assessment: no apparent anesthetic complications    Post vital signs: stable    Level of consciousness: sedated    Nausea/Vomiting: no nausea/vomiting    Complications: none    Transfer of care protocol was followed      Last vitals:   Visit Vitals  BP (!) 165/78 (BP Location: Right arm, Patient Position: Sitting)   Pulse 73   Temp 36.6 °C (97.9 °F) (Temporal)   Resp 16   Ht 5' 8" (1.727 m)   Wt 136.1 kg (300 lb)   SpO2 97%   Breastfeeding No   BMI 45.61 kg/m²     "

## 2023-05-02 NOTE — DISCHARGE SUMMARY
[facility] (Hospital)  Discharge Note  Short Stay    Procedure(s) (LRB):  CYSTOLITHOLAPAXY (N/A)      OUTCOME: Patient tolerated treatment/procedure well without complication and is now ready for discharge.    DISPOSITION: Home or Self Care    FINAL DIAGNOSIS:  bladder stone    FOLLOWUP: In clinic    DISCHARGE INSTRUCTIONS:    Discharge Procedure Orders   Diet Adult Regular     Notify your health care provider if you experience any of the following:  severe uncontrolled pain     Notify your health care provider if you experience any of the following:  persistent nausea and vomiting or diarrhea     Notify your health care provider if you experience any of the following:  temperature >100.4     Activity as tolerated        TIME SPENT ON DISCHARGE: 5 minutes

## 2023-05-02 NOTE — ANESTHESIA PROCEDURE NOTES
Intubation    Date/Time: 5/2/2023 7:03 AM  Performed by: Randa Trinh CRNA  Authorized by: He Oshea MD     Intubation:     Induction:  Intravenous    Intubated:  Postinduction    Mask Ventilation:  Easy mask    Attempts:  1    Attempted By:  CRNA    Method of Intubation:  Video laryngoscopy    Blade:  Thompson 3    Laryngeal View Grade: Grade I - full view of cords      Difficult Airway Encountered?: No      Complications:  None    Airway Device:  Oral endotracheal tube    Airway Device Size:  7.5    Style/Cuff Inflation:  Cuffed (inflated to minimal occlusive pressure)    Tube secured:  22    Secured at:  The lips    Placement Verified By:  Capnometry    Complicating Factors:  None    Findings Post-Intubation:  BS equal bilateral and atraumatic/condition of teeth unchanged

## 2023-05-08 LAB
FINAL PATHOLOGIC DIAGNOSIS: NORMAL
GROSS: NORMAL
Lab: NORMAL

## 2023-06-05 PROBLEM — N39.0 ACUTE UTI: Status: RESOLVED | Noted: 2023-03-02 | Resolved: 2023-06-05

## 2023-06-14 DIAGNOSIS — E11.9 TYPE 2 DIABETES MELLITUS WITHOUT COMPLICATION: ICD-10-CM

## 2023-08-03 ENCOUNTER — PROCEDURE VISIT (OUTPATIENT)
Dept: UROLOGY | Facility: CLINIC | Age: 63
End: 2023-08-03
Payer: COMMERCIAL

## 2023-08-03 VITALS
WEIGHT: 293 LBS | BODY MASS INDEX: 44.41 KG/M2 | RESPIRATION RATE: 18 BRPM | SYSTOLIC BLOOD PRESSURE: 139 MMHG | HEIGHT: 68 IN | HEART RATE: 77 BPM | DIASTOLIC BLOOD PRESSURE: 85 MMHG

## 2023-08-03 DIAGNOSIS — C67.2 MALIGNANT NEOPLASM OF LATERAL WALL OF URINARY BLADDER: Primary | ICD-10-CM

## 2023-08-03 DIAGNOSIS — N39.0 ACUTE UTI: ICD-10-CM

## 2023-08-03 PROCEDURE — 88112 CYTOPATH CELL ENHANCE TECH: CPT | Performed by: PATHOLOGY

## 2023-08-03 PROCEDURE — 88112 PR  CYTOPATH, CELL ENHANCE TECH: ICD-10-PCS | Mod: 26,,, | Performed by: PATHOLOGY

## 2023-08-03 PROCEDURE — 88112 CYTOPATH CELL ENHANCE TECH: CPT | Mod: 26,,, | Performed by: PATHOLOGY

## 2023-08-03 PROCEDURE — 52000 PR CYSTOURETHROSCOPY: ICD-10-PCS | Mod: S$GLB,,, | Performed by: UROLOGY

## 2023-08-03 PROCEDURE — 52000 CYSTOURETHROSCOPY: CPT | Mod: S$GLB,,, | Performed by: UROLOGY

## 2023-08-03 RX ORDER — CIPROFLOXACIN 500 MG/1
500 TABLET ORAL
Status: COMPLETED | OUTPATIENT
Start: 2023-08-03 | End: 2023-08-03

## 2023-08-03 RX ADMIN — CIPROFLOXACIN 500 MG: 500 TABLET ORAL at 02:08

## 2023-08-03 NOTE — PROGRESS NOTES
..Per Dr. Clark oral 500 MG ciprofloxacin was given to pt. Pt instructed to remain in clinic for 15 minutes to monitor for signs & symptoms of adverse reaction. Pt verbalized understanding.      Loraine Milanes RN

## 2023-08-03 NOTE — PROCEDURES
Procedures  Chief Complaint:   Encounter Diagnoses   Name Primary?    Malignant neoplasm of lateral wall of urinary bladder Yes    Acute UTI        HPI:    8/3/23- here for surveillance cystoscopy.    62-year-old female who comes in from [place] with a new diagnosis of possible bladder cancer.  She states this past summer she had difficulty voiding and a Tomas catheter was placed.  She did have a little bit hematuria afterwards, could have been related to the catheter placement.  She was doing well until on proximally [place] she began to have gross hematuria with no other significant symptoms.  This eventually cleared up on its own, CT scan demonstrated possible bladder masses and she was referred to an outside urologist.  Cystoscopy demonstrated significant tumor surrounding the right ureteral orifice, surgeries were attempted, but could not get scheduled due to other factors for preoperative clearance.  Patient is now cleared, but has decided to come to [facility] for treatment.  Patient is a current smoker, no other lower urinary tract symptoms.  No other urological or gynecological history, she has all of her female organs.  No evidence of true incontinence, no constipation.  She would an uncle with bladder cancer, no other family history of urological cancers or stones.    Allergies:  Penicillins    Medications:  has a current medication list which includes the following prescription(s): atorvastatin, azelastine, benzonatate, dexcom g6 sensor, cycloset, dexcom g6 sensor, dexcom g6 transmitter, ezetimibe, baqsimi, hyoscyamine, insulin, insulin aspart u-100, lisinopril, multivitamin, phenazopyridine, timolol maleate 0.5%, trulicity, and varenicline.    Review of Systems:  General: No fever, chills, fatigability, or weight loss.  Skin: No rashes, itching, or changes in color or texture of skin.  Chest: Denies EASON, cyanosis, wheezing, cough, and sputum production.  Abdomen: Appetite fine. No weight loss. Denies  diarrhea, abdominal pain, hematemesis, or blood in stool.  Musculoskeletal: No joint stiffness or swelling. Denies back pain.  : As above.  All other review of systems negative.    PMH:   has a past medical history of Asthma, Cigarette nicotine dependence without complication (2017), Colon polyp, DM (diabetes mellitus) type II uncontrolled with eye manifestation, Hyperplastic colon polyp (2015), Hypertension, Morbid obesity (10/08/2014), and Sleep apnea.    PSH:   has a past surgical history that includes  section; Eye surgery (Right); turbt (transurethral resection of bladder tumor) (N/A, 2023); and Cystoscopy with ureteroscopy, retrograde pyelography, and insertion of stent (Bilateral, 2023).    FamHx: family history includes Bladder Cancer in her paternal uncle; Coronary artery disease in her father; Diabetes in her father; Hypertension in her father; Lung cancer in her father; Lymphoma in her paternal uncle; Pancreatic cancer in her mother; Stroke in her father.    SocHx:  reports that she has been smoking cigarettes. She has a 27.75 pack-year smoking history. She has never used smokeless tobacco. She reports that she does not drink alcohol and does not use drugs.      Physical Exam:  There were no vitals filed for this visit.    General: A&Ox3, no apparent distress, no deformities  Neck: No masses, normal ROM  Lungs: normal inspiration, no use of accessory muscles  Heart: normal pulse, no arrhythmias  Abdomen: Soft, NT, ND, no masses, no hernias, no hepatosplenomegaly  Skin: The skin is warm and dry. No jaundice.  Ext: No c/c/e.  : - No pelvic floor prolapse.  Normal introitus, no urethral abnomralities. No Perineal abnormalities.    Labs/Studies:   CT stone protocol right hydro, 4 mm right ureteral stone, 1.3 cm renal stone, 3 cm bladder mass   CT urogram right bladder wall tumor     Procedure: Diagnostic Cystoscopy    Procedure in Detail: After proper consents  were obtained, the patient was prepped and draped in normal sterile fashion for diagnostic cystoscopy. The flexible cystoscope was then introduced into the urethra, and advanced into the bladder under direct vision. The urethral mucosa appeared normal, and no strictures were noted. The sphincter appeared to be normal.  The bladder neck was normal. Inspection of the interior of the bladder was then carried out. The trigone was unremarkable, with no mucosal lesions. Stone was again seen above and lateral to the right UO, with the grasper we were able to free this from the underlying mucosa.  No tumors below this stone.  The ureteral orifices were normal in position and configuration. Systematic inspection of the mucosa of the bladder was then carried out, rotating the cystoscope so that all areas of the left and right lateral walls, the dome of the bladder, and the posterior wall were all visualized. The cystoscope was then advanced further into the bladder, and maximum deflection of the scope was performed so that the bladder neck could be inspected. No mucosal lesions were noted there. The cystoscope was then removed, and the procedure terminated.     Findings: stone again seen lateral to the RU freed from the mucosa with no tumor underlying.      Impression/Plan:     1. Bladder cancer-  vesicolithalopaxy negative bx  5/2/23,  pTa high TURBT, retros normal  1/31/23    Appears to have a stone adhered to the bladder again lateral to the right UO, freed by the grasper.  No tumor underlying the stone.  Therefore will proceed with surveillance cystoscopy in 3 months and she will take aggressive but conservative measures to reduce stone burden prior to the next visit.  Call with issues passing these stones.    2. Nephrolithiasis- please see above.  Monitor the renal stones.

## 2023-08-07 LAB
FINAL PATHOLOGIC DIAGNOSIS: NORMAL
Lab: NORMAL

## 2023-09-01 ENCOUNTER — PATIENT MESSAGE (OUTPATIENT)
Dept: FAMILY MEDICINE | Facility: CLINIC | Age: 63
End: 2023-09-01
Payer: COMMERCIAL

## 2023-09-12 NOTE — TELEPHONE ENCOUNTER
----- Message from Masha Shafer sent at 9/11/2023  3:33 PM EDT -----  Regarding: Question regarding NUSWAB VG+  Contact: [phone]  Hi Dr. Turner,  I’m still having really bad itching, could it be something else? Also I’m sorry I missed my appointment, I need to reschedule that but i think I changed my mind I want to try the non hormonal iud. Thanks again! Masha    Returned pt call and gave lab results and instructions.

## 2023-10-12 ENCOUNTER — PATIENT MESSAGE (OUTPATIENT)
Dept: FAMILY MEDICINE | Facility: CLINIC | Age: 63
End: 2023-10-12
Payer: COMMERCIAL

## 2023-10-27 ENCOUNTER — PATIENT MESSAGE (OUTPATIENT)
Dept: FAMILY MEDICINE | Facility: CLINIC | Age: 63
End: 2023-10-27
Payer: COMMERCIAL

## 2023-11-01 ENCOUNTER — OFFICE VISIT (OUTPATIENT)
Dept: FAMILY MEDICINE | Facility: CLINIC | Age: 63
End: 2023-11-01
Payer: COMMERCIAL

## 2023-11-01 VITALS
DIASTOLIC BLOOD PRESSURE: 70 MMHG | HEIGHT: 68 IN | WEIGHT: 293 LBS | BODY MASS INDEX: 44.41 KG/M2 | SYSTOLIC BLOOD PRESSURE: 126 MMHG | HEART RATE: 75 BPM | RESPIRATION RATE: 16 BRPM

## 2023-11-01 DIAGNOSIS — R06.09 DOE (DYSPNEA ON EXERTION): ICD-10-CM

## 2023-11-01 DIAGNOSIS — G47.33 OSA (OBSTRUCTIVE SLEEP APNEA): ICD-10-CM

## 2023-11-01 DIAGNOSIS — R93.1 ABNORMAL CT SCAN OF HEART: ICD-10-CM

## 2023-11-01 DIAGNOSIS — E11.49 TYPE II DIABETES MELLITUS WITH NEUROLOGICAL MANIFESTATIONS: ICD-10-CM

## 2023-11-01 DIAGNOSIS — R53.83 FATIGUE, UNSPECIFIED TYPE: Primary | ICD-10-CM

## 2023-11-01 PROCEDURE — 1159F MED LIST DOCD IN RCRD: CPT | Mod: CPTII,S$GLB,, | Performed by: FAMILY MEDICINE

## 2023-11-01 PROCEDURE — 3074F SYST BP LT 130 MM HG: CPT | Mod: CPTII,S$GLB,, | Performed by: FAMILY MEDICINE

## 2023-11-01 PROCEDURE — 4010F ACE/ARB THERAPY RXD/TAKEN: CPT | Mod: CPTII,S$GLB,, | Performed by: FAMILY MEDICINE

## 2023-11-01 PROCEDURE — 99999 PR PBB SHADOW E&M-EST. PATIENT-LVL V: ICD-10-PCS | Mod: PBBFAC,,, | Performed by: FAMILY MEDICINE

## 2023-11-01 PROCEDURE — 3046F PR MOST RECENT HEMOGLOBIN A1C LEVEL > 9.0%: ICD-10-PCS | Mod: CPTII,S$GLB,, | Performed by: FAMILY MEDICINE

## 2023-11-01 PROCEDURE — 1159F PR MEDICATION LIST DOCUMENTED IN MEDICAL RECORD: ICD-10-PCS | Mod: CPTII,S$GLB,, | Performed by: FAMILY MEDICINE

## 2023-11-01 PROCEDURE — 3074F PR MOST RECENT SYSTOLIC BLOOD PRESSURE < 130 MM HG: ICD-10-PCS | Mod: CPTII,S$GLB,, | Performed by: FAMILY MEDICINE

## 2023-11-01 PROCEDURE — 3078F PR MOST RECENT DIASTOLIC BLOOD PRESSURE < 80 MM HG: ICD-10-PCS | Mod: CPTII,S$GLB,, | Performed by: FAMILY MEDICINE

## 2023-11-01 PROCEDURE — 3008F PR BODY MASS INDEX (BMI) DOCUMENTED: ICD-10-PCS | Mod: CPTII,S$GLB,, | Performed by: FAMILY MEDICINE

## 2023-11-01 PROCEDURE — 3078F DIAST BP <80 MM HG: CPT | Mod: CPTII,S$GLB,, | Performed by: FAMILY MEDICINE

## 2023-11-01 PROCEDURE — 3008F BODY MASS INDEX DOCD: CPT | Mod: CPTII,S$GLB,, | Performed by: FAMILY MEDICINE

## 2023-11-01 PROCEDURE — 3046F HEMOGLOBIN A1C LEVEL >9.0%: CPT | Mod: CPTII,S$GLB,, | Performed by: FAMILY MEDICINE

## 2023-11-01 PROCEDURE — 99214 OFFICE O/P EST MOD 30 MIN: CPT | Mod: S$GLB,,, | Performed by: FAMILY MEDICINE

## 2023-11-01 PROCEDURE — 4010F PR ACE/ARB THEARPY RXD/TAKEN: ICD-10-PCS | Mod: CPTII,S$GLB,, | Performed by: FAMILY MEDICINE

## 2023-11-01 PROCEDURE — 99214 PR OFFICE/OUTPT VISIT, EST, LEVL IV, 30-39 MIN: ICD-10-PCS | Mod: S$GLB,,, | Performed by: FAMILY MEDICINE

## 2023-11-01 PROCEDURE — 99999 PR PBB SHADOW E&M-EST. PATIENT-LVL V: CPT | Mod: PBBFAC,,, | Performed by: FAMILY MEDICINE

## 2023-11-01 RX ORDER — BROMOCRIPTINE MESYLATE 0.8 MG/1
3.2 TABLET ORAL
COMMUNITY
Start: 2023-09-12

## 2023-11-01 RX ORDER — SEMAGLUTIDE 0.68 MG/ML
INJECTION, SOLUTION SUBCUTANEOUS
COMMUNITY
Start: 2023-09-11

## 2023-11-01 RX ORDER — INSULIN LISPRO 100 [IU]/ML
INJECTION, SOLUTION INTRAVENOUS; SUBCUTANEOUS
COMMUNITY
Start: 2023-06-07 | End: 2023-11-01

## 2023-11-01 NOTE — PROGRESS NOTES
Subjective:      Patient ID: Chelsie Garrett is a 63 y.o. female.    Chief Complaint: Fatigue    Fatigue: Patient complains of fatigue. She has had this for about a year when she feels she got covid when her  was proven to have covid.  In December, she had bladder cancer and had surgery.   She is going to be seen by him soon.   She did not have to have chemo or radiation.  She is not as active and she has had sugar increases from that.  She has desire to do things but does not have the energy to do things.  Symptoms began  1 year ago . Sentinal symptom the patient feels fatigue began with: none. Symptoms of her fatigue have been general malaise. Her sleep is disrupted at night now and she has a dog that is sleeping with them.  She has a bad bed also.  She awakens tired. She awakes in the middle of the night.  If she goes to a hotel, she does not have any problems sleeping at all.       She has known DELANEY and she could not afford the cpap.  The study was from 2021.  She would like to get this now.    Patient describes the following psychologic symptoms: none.  Patient denies significant change in weight. Symptoms have progressed to a point and plateaued. Severity has been symptoms bothersome, but easily able to carry out all usual work/school/family activities. Previous visits for this problem: none.     She feels her fatigue was present prior to being on ozempic.  She does have EASON with this now. She has not had CP noted.  X-Ray Chest PA And Lateral  Order: [id number]  Status: Final result     Visible to patient: Yes (seen)     Next appt: 11/09/2023 at 01:15 PM in Urology (Marlon Clark MD)     Dx: Malignant neoplasm of lateral wall of...     0 Result Notes  Details    Reading Physician Reading Date Result Priority   Jose Khalil, DO  [phone] 4/20/2023 Routine     Narrative & Impression  EXAMINATION:  XR CHEST PA AND LATERAL     CLINICAL HISTORY:  Malignant neoplasm of lateral wall of bladder    "  TECHNIQUE:  PA and lateral views of the chest were performed.     COMPARISON:  09/28/2015     FINDINGS:  The lungs are clear and free of infiltrate.  No pleural effusion or pneumothorax. The heart is not enlarged.     Impression:     1.  No acute cardiopulmonary process.        Electronically signed by: Jose Khalil DO  Date:                                            04/20/2023  Time:                                           14:49       CT Chest Lung Screening Low Dose  Order: [id number]  Status: Final result     Visible to patient: Yes (seen)     Next appt: 11/09/2023 at 01:15 PM in Urology (Marlon Clark MD)     Dx: Nicotine dependence, cigarettes, unco...     0 Result Notes    1 Patient Communication    1  Topic  Details    Reading Physician Reading Date Result Priority   Bryan Wells MD  [phone] 3/2/2023      Narrative & Impression  EXAM: LOW DOSE LUNG CANCER SCREENING CT     DATE: 3/2/2023 10:30 AM     CLINICAL HISTORY:  Risk factors for lung cancer.  Tobacco use.     COMPARISON: None     TECHNIQUE: Axial CT imaging was performed of the chest utilizing a low-dose protocol.     Computed tomography dose index (CTDI): 11.65 mGy  Dose-length product (DLP): 408.17 mGy-cm     FINDINGS: 1.  No pulmonary parenchymal abnormalities     2.  1.2 cm right lobe hepatic calcified granuloma  3.  Coronary artery calcifications  4.  Thoracolumbar spondylosis without vertebral body fracture  5.  ASPVD without aneurysm           Impression:     1.  No suspicious pulmonary parenchymal abnormalities     2.  Coronary artery disease and other nonemergent abnormalities listed above     CATEGORY (ACR Lung-RADS Version 1.0): Category 1-no suspicious pulmonary abnormalities-recommend continue annual low-dose screening based on history     MODIFIER: S (coronary artery calcifications)     NOTE: The radiation dosage used for this study is greater then recommended for "low-dose" technique.        All CT scans at [this " location] are performed using dose modulation techniques as appropriate to a performed exam including the following: automated exposure control; adjustment of the mA and/or kV according to patient size (this includes techniques or standardized protocols for targeted exams where dose is matched to indication / reason for exam; i.e. extremities or head); use of iterative reconstruction technique.     Finalized on: 3/2/2023 10:44 AM By:  Bryan Wells MD  [facility]# [id number]      2023 10:[id number]    [facility]     The patient's Health Maintenance was reviewed and the following appears to be due at this time:   Health Maintenance Due   Topic Date Due    Shingles Vaccine (1 of 2) Never done    Pneumococcal Vaccines (Age 0-64) (2 - PCV) 2017    Colorectal Cancer Screening  2020    RSV Vaccine (Age 60+) (1 - 1-dose 60+ series) Never done    Lipid Panel  2023    Diabetes Urine Screening  2023    Influenza Vaccine (1) 2023    COVID-19 Vaccine (3 -  season) 2023    Hemoglobin A1c  2023        Past Medical History:  Past Medical History:   Diagnosis Date    Asthma     CHILDHOOD    Cigarette nicotine dependence without complication 2017    Colon polyp     DM (diabetes 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  Denies bulimia, amenorrhea, cold intolerance, edema, hip pain, hirsutism, knee pain, polydipsia, polyuria, thirst and weakness.  The patient does not perform regular exercise.  Previous treatments for obesity :self-directed dieting with success.  The patient and I discussed the importance of exercise.    She has continued to gain weight.   Wt Readings from Last     Sleep apnea     positive test     Past Surgical History:   Procedure Laterality Date     SECTION      X2    CYSTOSCOPIC LITHOLAPAXY N/A 2023    Procedure:  CYSTOLITHOLAPAXY;  Surgeon: Marlon Clark MD;  Location: [facility] OR;  Service: Urology;  Laterality: N/A;    CYSTOSCOPY WITH URETEROSCOPY, RETROGRADE PYELOGRAPHY, AND INSERTION OF STENT Bilateral 1/31/2023    Procedure: CYSTOSCOPY, WITH RETROGRADE PYELOGRAM AND URETERAL STENT INSERTION;  Surgeon: Marlon Clark MD;  Location: [facility] OR;  Service: Urology;  Laterality: Bilateral;  Stent to right only    EYE SURGERY Right     2013, 2014    TURBT (TRANSURETHRAL RESECTION OF BLADDER TUMOR) N/A 1/31/2023    Procedure: TURBT (TRANSURETHRAL RESECTION OF BLADDER TUMOR);  Surgeon: Marlon Clark MD;  Location: [facility] OR;  Service: Urology;  Laterality: N/A;     Review of patient's allergies indicates:   Allergen Reactions    Penicillins Anaphylaxis     Current Outpatient Medications on File Prior to Visit   Medication Sig Dispense Refill    atorvastatin (LIPITOR) 80 MG tablet Take 1 tablet (80 mg total) by mouth every evening. 90 tablet 3    azelastine (ASTELIN) 137 mcg (0.1 %) nasal spray 2 sprays (274 mcg total) by Nasal route 2 (two) times daily. 30 mL 11    blood-glucose sensor (DEXCOM G6 SENSOR) Hannah Use as directed for continuous glucose monitoring. Change sensor every 10 days.      bromocriptine (CYCLOSET) 0.8 mg Tab Take 0.8 mg by mouth every evening. Take 3 pills every evening      bromocriptine (CYCLOSET) 0.8 mg Tab Take 3.2 mg by mouth.      DEXCOM G6 SENSOR Hannah SMARTSIG:Topical Every 10 Days      DEXCOM G6 TRANSMITTER Hannah Use as directed to check glucose. Change every 90 days.      ezetimibe (ZETIA) 10 mg tablet Take 1 tablet (10 mg total) by mouth every evening. 90 tablet 3    insulin aspart U-100 (NOVOLOG FLEXPEN U-100 INSULIN) 100 unit/mL (3 mL) InPn pen THREE TIMES DAILY with meals per SLIDING SCALE 45 mL 11    lisinopriL (PRINIVIL,ZESTRIL) 2.5 MG tablet Take 1 tablet (2.5 mg total) by mouth every evening. 90 tablet 3    multivitamin capsule Take 1 capsule by mouth once daily.      OZEMPIC 0.25  mg or 0.5 mg (2 mg/3 mL) pen injector Inject into the skin.      timolol maleate 0.5% (TIMOPTIC) 0.5 % Drop Place 1 drop into both eyes 2 (two) times daily.      [DISCONTINUED] benzonatate (TESSALON) 200 MG capsule Take 1 capsule (200 mg total) by mouth 3 (three) times daily as needed for Cough. 30 capsule 0    [DISCONTINUED] clindamycin (CLEOCIN) 300 MG capsule Take 1 capsule (300 mg total) by mouth every 8 (eight) hours. 21 capsule 0    [DISCONTINUED] glucagon (BAQSIMI) 3 mg/actuation Spry SPRAY 1 SPRAY in one nostril FOR 1 dose      [DISCONTINUED] hyoscyamine (LEVSIN/SL) 0.125 mg Subl Place 1 tablet (0.125 mg total) under the tongue every 4 (four) hours as needed (bladder spasms). 40 tablet 0    [DISCONTINUED] insulin (LANTUS SOLOSTAR U-100 INSULIN) glargine 100 units/mL SubQ pen 90 units SQ every AM. (Patient taking differently: 80 units SQ every AM.) 75 mL 11    [DISCONTINUED] insulin lispro 100 unit/mL pen SMARTSI-20 Unit(s) SUB-Q 2-3 Times Daily      [DISCONTINUED] oxyCODONE-acetaminophen (PERCOCET) 5-325 mg per tablet Take 1 tablet by mouth every 4 (four) hours as needed for Pain. 10 tablet 0    [DISCONTINUED] phenazopyridine (PYRIDIUM) 200 MG tablet Take 1 tablet (200 mg total) by mouth 3 (three) times daily as needed (Burning). 15 tablet 0    [DISCONTINUED] TRULICITY 1.5 mg/0.5 mL pen injector INJECT 1.5 MG INTO THE SKIN EVERY 7 DAYS 6 mL 1    [DISCONTINUED] varenicline (CHANTIX STARTING MONTH BOX) 0.5 mg (11)- 1 mg (42) tablet Take one 0.5mg tab by mouth once daily X3 days,then increase to one 0.5mg tab twice daily X4 days,then increase to one 1mg tab twice daily (Patient not taking: Reported on 2023) 1 each 0     No current facility-administered medications on file prior to visit.     Social History     Socioeconomic History    Marital status:    Occupational History     Employer: [employer]   Tobacco Use    Smoking status: Every Day     Current packs/day: 0.75     Average  "packs/day: 0.8 packs/day for 37.0 years (27.8 ttl pk-yrs)     Types: Cigarettes    Smokeless tobacco: Never    Tobacco comments:     HOLD MIDNIGHT PRIOR TO SURGERY   Substance and Sexual Activity    Alcohol use: Never    Drug use: No     Family History   Problem Relation Age of Onset    Pancreatic cancer Mother     Stroke Father     Coronary artery disease Father     Diabetes Father     Hypertension Father     Lung cancer Father     Bladder Cancer Paternal Uncle     Lymphoma Paternal Uncle        Review of Systems   Constitutional:  Positive for fatigue. Negative for fever and unexpected weight change.   HENT:  Negative for congestion, ear pain, postnasal drip and sore throat.    Eyes:  Negative for visual disturbance.   Respiratory:  Positive for shortness of breath. Negative for cough, chest tightness and wheezing.    Cardiovascular:  Negative for chest pain, palpitations and leg swelling.   Gastrointestinal:  Negative for abdominal pain, blood in stool, constipation, diarrhea, nausea and vomiting.   Genitourinary:  Negative for dysuria and hematuria.   Neurological:  Negative for weakness and numbness.       Objective:   /70 (BP Location: Left arm, Patient Position: Sitting, BP Method: Medium (Automatic))   Pulse 75   Resp 16   Ht 5' 8" (1.727 m)   Wt (!) 136.5 kg (301 lb)   BMI 45.77 kg/m²     Physical Exam  Constitutional:       General: She is not in acute distress.     Appearance: She is well-developed. She is not diaphoretic.   Cardiovascular:      Rate and Rhythm: Normal rate and regular rhythm.      Heart sounds: No murmur heard.     No friction rub. No gallop.   Pulmonary:      Effort: Pulmonary effort is normal. No respiratory distress.      Breath sounds: Normal breath sounds. No wheezing or rales.       Assessment:     1. Fatigue, unspecified type    2. DELANEY (obstructive sleep apnea)    3. EASON (dyspnea on exertion)    4. Abnormal CT scan of heart    5. Type II diabetes mellitus with " neurological manifestations      Plan:   I am having Chelsie Garrett maintain her timolol maleate 0.5%, CYCLOSET, azelastine, DEXCOM G6 SENSOR, DEXCOM G6 SENSOR, DEXCOM G6 TRANSMITTER, multivitamin, atorvastatin, ezetimibe, lisinopriL, insulin aspart U-100, OZEMPIC, and CYCLOSET.  No problem-specific Assessment & Plan notes found for this encounter.      No follow-ups on file.    Chelsie was seen today for fatigue.    Diagnoses and all orders for this visit:    Fatigue, unspecified type  -     Ambulatory referral/consult to Sleep Disorders; Future  -     Ambulatory referral/consult to Cardiology; Future  -     CBC Auto Differential; Future  -     TSH; Future    DELANEY (obstructive sleep apnea)  -     Ambulatory referral/consult to Sleep Disorders; Future    EASON (dyspnea on exertion)  -     Ambulatory referral/consult to Cardiology; Future  -     B-TYPE NATRIURETIC PEPTIDE; Future    Abnormal CT scan of heart  -     Ambulatory referral/consult to Cardiology; Future    Type II diabetes mellitus with neurological manifestations  -     Comprehensive Metabolic Panel; Future  -     Hemoglobin A1C; Future  -     Lipid Panel; Future  -     Microalbumin/Creatinine Ratio, Urine; Future         The patient was instructed to stop the following meds:  Medications Discontinued During This Encounter   Medication Reason    clindamycin (CLEOCIN) 300 MG capsule Patient no longer taking    insulin (LANTUS SOLOSTAR U-100 INSULIN) glargine 100 units/mL SubQ pen Patient no longer taking    varenicline (CHANTIX STARTING MONTH BOX) 0.5 mg (11)- 1 mg (42) tablet Patient no longer taking    TRULICITY 1.5 mg/0.5 mL pen injector Patient no longer taking    phenazopyridine (PYRIDIUM) 200 MG tablet Patient no longer taking    oxyCODONE-acetaminophen (PERCOCET) 5-325 mg per tablet Patient no longer taking    insulin lispro 100 unit/mL pen Patient no longer taking    hyoscyamine (LEVSIN/SL) 0.125 mg Subl Patient no longer taking    glucagon (BAQSIMI) 3  mg/actuation Spry Patient no longer taking    benzonatate (TESSALON) 200 MG capsule Patient no longer taking     Orders Placed This Encounter   Procedures    CBC Auto Differential     Standing Status:   Future     Standing Expiration Date:   12/30/2024    Comprehensive Metabolic Panel     Standing Status:   Future     Standing Expiration Date:   10/31/2024    Hemoglobin A1C     Standing Status:   Future     Standing Expiration Date:   10/31/2024    Lipid Panel     Standing Status:   Future     Standing Expiration Date:   10/31/2024    Microalbumin/Creatinine Ratio, Urine     May change to clinic collect if the patient is in the clinic at the time.     Standing Status:   Future     Standing Expiration Date:   11/1/2024     Order Specific Question:   Specimen Source     Answer:   Urine    TSH     Standing Status:   Future     Standing Expiration Date:   11/1/2024    B-TYPE NATRIURETIC PEPTIDE     Standing Status:   Future     Standing Expiration Date:   12/30/2024    Ambulatory referral/consult to Sleep Disorders     Standing Status:   Future     Standing Expiration Date:   12/1/2024     Referral Priority:   Routine     Referral Type:   Consultation     Requested Specialty:   Sleep Medicine     Number of Visits Requested:   1    Ambulatory referral/consult to Cardiology     Standing Status:   Future     Standing Expiration Date:   12/1/2024     Referral Priority:   Routine     Referral Type:   Consultation     Referral Reason:   Specialty Services Required     Requested Specialty:   Cardiology     Number of Visits Requested:   1       Medication List with Changes/Refills   Current Medications    ATORVASTATIN (LIPITOR) 80 MG TABLET    Take 1 tablet (80 mg total) by mouth every evening.    AZELASTINE (ASTELIN) 137 MCG (0.1 %) NASAL SPRAY    2 sprays (274 mcg total) by Nasal route 2 (two) times daily.    BLOOD-GLUCOSE SENSOR (DEXCOM G6 SENSOR) ADNRÉS    Use as directed for continuous glucose monitoring. Change sensor every  10 days.    BROMOCRIPTINE (CYCLOSET) 0.8 MG TAB    Take 0.8 mg by mouth every evening. Take 3 pills every evening    BROMOCRIPTINE (CYCLOSET) 0.8 MG TAB    Take 3.2 mg by mouth.    DEXCOM G6 SENSOR ANDRÉS    SMARTSIG:Topical Every 10 Days    DEXCOM G6 TRANSMITTER ANDRÉS    Use as directed to check glucose. Change every 90 days.    EZETIMIBE (ZETIA) 10 MG TABLET    Take 1 tablet (10 mg total) by mouth every evening.    INSULIN ASPART U-100 (NOVOLOG FLEXPEN U-100 INSULIN) 100 UNIT/ML (3 ML) INPN PEN    THREE TIMES DAILY with meals per SLIDING SCALE    LISINOPRIL (PRINIVIL,ZESTRIL) 2.5 MG TABLET    Take 1 tablet (2.5 mg total) by mouth every evening.    MULTIVITAMIN CAPSULE    Take 1 capsule by mouth once daily.    OZEMPIC 0.25 MG OR 0.5 MG (2 MG/3 ML) PEN INJECTOR    Inject into the skin.    TIMOLOL MALEATE 0.5% (TIMOPTIC) 0.5 % DROP    Place 1 drop into both eyes 2 (two) times daily.   Discontinued Medications    BENZONATATE (TESSALON) 200 MG CAPSULE    Take 1 capsule (200 mg total) by mouth 3 (three) times daily as needed for Cough.    CLINDAMYCIN (CLEOCIN) 300 MG CAPSULE    Take 1 capsule (300 mg total) by mouth every 8 (eight) hours.    GLUCAGON (BAQSIMI) 3 MG/ACTUATION SPRY    SPRAY 1 SPRAY in one nostril FOR 1 dose    HYOSCYAMINE (LEVSIN/SL) 0.125 MG SUBL    Place 1 tablet (0.125 mg total) under the tongue every 4 (four) hours as needed (bladder spasms).    INSULIN (LANTUS SOLOSTAR U-100 INSULIN) GLARGINE 100 UNITS/ML SUBQ PEN    90 units SQ every AM.    INSULIN LISPRO 100 UNIT/ML PEN    SMARTSI-20 Unit(s) SUB-Q 2-3 Times Daily    OXYCODONE-ACETAMINOPHEN (PERCOCET) 5-325 MG PER TABLET    Take 1 tablet by mouth every 4 (four) hours as needed for Pain.    PHENAZOPYRIDINE (PYRIDIUM) 200 MG TABLET    Take 1 tablet (200 mg total) by mouth 3 (three) times daily as needed (Burning).    TRULICITY 1.5 MG/0.5 ML PEN INJECTOR    INJECT 1.5 MG INTO THE SKIN EVERY 7 DAYS    VARENICLINE (CHANTIX STARTING MONTH BOX) 0.5 MG  (11)- 1 MG (42) TABLET    Take one 0.5mg tab by mouth once daily X3 days,then increase to one 0.5mg tab twice daily X4 days,then increase to one 1mg tab twice daily      Medication List with Changes/Refills   Current Medications    ATORVASTATIN (LIPITOR) 80 MG TABLET    Take 1 tablet (80 mg total) by mouth every evening.       Start Date: 2/7/2023  End Date: --    AZELASTINE (ASTELIN) 137 MCG (0.1 %) NASAL SPRAY    2 sprays (274 mcg total) by Nasal route 2 (two) times daily.       Start Date: 10/7/2022 End Date: --    BLOOD-GLUCOSE SENSOR (DEXCOM G6 SENSOR) ANDRÉS    Use as directed for continuous glucose monitoring. Change sensor every 10 days.       Start Date: 10/31/2022End Date: --    BROMOCRIPTINE (CYCLOSET) 0.8 MG TAB    Take 0.8 mg by mouth every evening. Take 3 pills every evening       Start Date: --        End Date: --    BROMOCRIPTINE (CYCLOSET) 0.8 MG TAB    Take 3.2 mg by mouth.       Start Date: 9/12/2023 End Date: --    DEXCOM G6 SENSOR ANDRÉS    SMARTSIG:Topical Every 10 Days       Start Date: 12/12/2022End Date: --    DEXCOM G6 TRANSMITTER ANDRÉS    Use as directed to check glucose. Change every 90 days.       Start Date: 10/31/2022End Date: --    EZETIMIBE (ZETIA) 10 MG TABLET    Take 1 tablet (10 mg total) by mouth every evening.       Start Date: 2/7/2023  End Date: --    INSULIN ASPART U-100 (NOVOLOG FLEXPEN U-100 INSULIN) 100 UNIT/ML (3 ML) INPN PEN    THREE TIMES DAILY with meals per SLIDING SCALE       Start Date: 2/21/2023 End Date: --    LISINOPRIL (PRINIVIL,ZESTRIL) 2.5 MG TABLET    Take 1 tablet (2.5 mg total) by mouth every evening.       Start Date: 2/7/2023  End Date: --    MULTIVITAMIN CAPSULE    Take 1 capsule by mouth once daily.       Start Date: --        End Date: --    OZEMPIC 0.25 MG OR 0.5 MG (2 MG/3 ML) PEN INJECTOR    Inject into the skin.       Start Date: 9/11/2023 End Date: --    TIMOLOL MALEATE 0.5% (TIMOPTIC) 0.5 % DROP    Place 1 drop into both eyes 2 (two) times daily.        Start Date: 2021  End Date: --   Discontinued Medications    BENZONATATE (TESSALON) 200 MG CAPSULE    Take 1 capsule (200 mg total) by mouth 3 (three) times daily as needed for Cough.       Start Date: 10/7/2022 End Date: 2023    CLINDAMYCIN (CLEOCIN) 300 MG CAPSULE    Take 1 capsule (300 mg total) by mouth every 8 (eight) hours.       Start Date: 2023 End Date: 2023    GLUCAGON (BAQSIMI) 3 MG/ACTUATION SPRY    SPRAY 1 SPRAY in one nostril FOR 1 dose       Start Date: 2021 End Date: 2023    HYOSCYAMINE (LEVSIN/SL) 0.125 MG SUBL    Place 1 tablet (0.125 mg total) under the tongue every 4 (four) hours as needed (bladder spasms).       Start Date: 2023 End Date: 2023    INSULIN (LANTUS SOLOSTAR U-100 INSULIN) GLARGINE 100 UNITS/ML SUBQ PEN    90 units SQ every AM.       Start Date: 2023 End Date: 2023    INSULIN LISPRO 100 UNIT/ML PEN    SMARTSI-20 Unit(s) SUB-Q 2-3 Times Daily       Start Date: 2023  End Date: 2023    OXYCODONE-ACETAMINOPHEN (PERCOCET) 5-325 MG PER TABLET    Take 1 tablet by mouth every 4 (four) hours as needed for Pain.       Start Date: 2023  End Date: 2023    PHENAZOPYRIDINE (PYRIDIUM) 200 MG TABLET    Take 1 tablet (200 mg total) by mouth 3 (three) times daily as needed (Burning).       Start Date: 2023  End Date: 2023    TRULICITY 1.5 MG/0.5 ML PEN INJECTOR    INJECT 1.5 MG INTO THE SKIN EVERY 7 DAYS       Start Date: 3/24/2021 End Date: 2023    VARENICLINE (CHANTIX STARTING MONTH BOX) 0.5 MG (11)- 1 MG (42) TABLET    Take one 0.5mg tab by mouth once daily X3 days,then increase to one 0.5mg tab twice daily X4 days,then increase to one 1mg tab twice daily       Start Date: 2023 End Date: 2023

## 2023-11-02 ENCOUNTER — LAB VISIT (OUTPATIENT)
Dept: LAB | Facility: HOSPITAL | Age: 63
End: 2023-11-02
Attending: FAMILY MEDICINE
Payer: COMMERCIAL

## 2023-11-02 DIAGNOSIS — R53.83 FATIGUE, UNSPECIFIED TYPE: ICD-10-CM

## 2023-11-02 DIAGNOSIS — R06.09 DOE (DYSPNEA ON EXERTION): ICD-10-CM

## 2023-11-02 DIAGNOSIS — E11.49 TYPE II DIABETES MELLITUS WITH NEUROLOGICAL MANIFESTATIONS: ICD-10-CM

## 2023-11-02 LAB
ALBUMIN SERPL BCP-MCNC: 3.2 G/DL (ref 3.5–5.2)
ALP SERPL-CCNC: 79 U/L (ref 55–135)
ALT SERPL W/O P-5'-P-CCNC: 14 U/L (ref 10–44)
ANION GAP SERPL CALC-SCNC: 11 MMOL/L (ref 8–16)
AST SERPL-CCNC: 15 U/L (ref 10–40)
BASOPHILS # BLD AUTO: 0.06 K/UL (ref 0–0.2)
BASOPHILS NFR BLD: 0.9 % (ref 0–1.9)
BILIRUB SERPL-MCNC: 0.5 MG/DL (ref 0.1–1)
BNP SERPL-MCNC: 21 PG/ML (ref 0–99)
BUN SERPL-MCNC: 12 MG/DL (ref 8–23)
CALCIUM SERPL-MCNC: 9.4 MG/DL (ref 8.7–10.5)
CHLORIDE SERPL-SCNC: 101 MMOL/L (ref 95–110)
CHOLEST SERPL-MCNC: 113 MG/DL (ref 120–199)
CHOLEST/HDLC SERPL: 2.3 {RATIO} (ref 2–5)
CO2 SERPL-SCNC: 24 MMOL/L (ref 23–29)
CREAT SERPL-MCNC: 0.7 MG/DL (ref 0.5–1.4)
DIFFERENTIAL METHOD: NORMAL
EOSINOPHIL # BLD AUTO: 0.1 K/UL (ref 0–0.5)
EOSINOPHIL NFR BLD: 1.7 % (ref 0–8)
ERYTHROCYTE [DISTWIDTH] IN BLOOD BY AUTOMATED COUNT: 13.4 % (ref 11.5–14.5)
EST. GFR  (NO RACE VARIABLE): >60 ML/MIN/1.73 M^2
ESTIMATED AVG GLUCOSE: 212 MG/DL (ref 68–131)
GLUCOSE SERPL-MCNC: 218 MG/DL (ref 70–110)
HBA1C MFR BLD: 9 % (ref 4–5.6)
HCT VFR BLD AUTO: 47.4 % (ref 37–48.5)
HDLC SERPL-MCNC: 50 MG/DL (ref 40–75)
HDLC SERPL: 44.2 % (ref 20–50)
HGB BLD-MCNC: 15.5 G/DL (ref 12–16)
IMM GRANULOCYTES # BLD AUTO: 0.01 K/UL (ref 0–0.04)
IMM GRANULOCYTES NFR BLD AUTO: 0.2 % (ref 0–0.5)
LDLC SERPL CALC-MCNC: 48.6 MG/DL (ref 63–159)
LYMPHOCYTES # BLD AUTO: 2 K/UL (ref 1–4.8)
LYMPHOCYTES NFR BLD: 32.1 % (ref 18–48)
MCH RBC QN AUTO: 30.8 PG (ref 27–31)
MCHC RBC AUTO-ENTMCNC: 32.7 G/DL (ref 32–36)
MCV RBC AUTO: 94 FL (ref 82–98)
MONOCYTES # BLD AUTO: 0.6 K/UL (ref 0.3–1)
MONOCYTES NFR BLD: 9.7 % (ref 4–15)
NEUTROPHILS # BLD AUTO: 3.5 K/UL (ref 1.8–7.7)
NEUTROPHILS NFR BLD: 55.4 % (ref 38–73)
NONHDLC SERPL-MCNC: 63 MG/DL
NRBC BLD-RTO: 0 /100 WBC
PLATELET # BLD AUTO: 236 K/UL (ref 150–450)
PMV BLD AUTO: 9.7 FL (ref 9.2–12.9)
POTASSIUM SERPL-SCNC: 4.6 MMOL/L (ref 3.5–5.1)
PROT SERPL-MCNC: 6.7 G/DL (ref 6–8.4)
RBC # BLD AUTO: 5.03 M/UL (ref 4–5.4)
SODIUM SERPL-SCNC: 136 MMOL/L (ref 136–145)
T4 FREE SERPL-MCNC: 1.07 NG/DL (ref 0.71–1.51)
TRIGL SERPL-MCNC: 72 MG/DL (ref 30–150)
TSH SERPL DL<=0.005 MIU/L-ACNC: 0.14 UIU/ML (ref 0.4–4)
WBC # BLD AUTO: 6.36 K/UL (ref 3.9–12.7)

## 2023-11-02 PROCEDURE — 83036 HEMOGLOBIN GLYCOSYLATED A1C: CPT | Performed by: FAMILY MEDICINE

## 2023-11-02 PROCEDURE — 80061 LIPID PANEL: CPT | Performed by: FAMILY MEDICINE

## 2023-11-02 PROCEDURE — 84443 ASSAY THYROID STIM HORMONE: CPT | Performed by: FAMILY MEDICINE

## 2023-11-02 PROCEDURE — 83880 ASSAY OF NATRIURETIC PEPTIDE: CPT | Performed by: FAMILY MEDICINE

## 2023-11-02 PROCEDURE — 36415 COLL VENOUS BLD VENIPUNCTURE: CPT | Mod: PO | Performed by: FAMILY MEDICINE

## 2023-11-02 PROCEDURE — 85025 COMPLETE CBC W/AUTO DIFF WBC: CPT | Performed by: FAMILY MEDICINE

## 2023-11-02 PROCEDURE — 80053 COMPREHEN METABOLIC PANEL: CPT | Performed by: FAMILY MEDICINE

## 2023-11-02 PROCEDURE — 84439 ASSAY OF FREE THYROXINE: CPT | Performed by: FAMILY MEDICINE

## 2023-11-03 ENCOUNTER — PATIENT MESSAGE (OUTPATIENT)
Dept: FAMILY MEDICINE | Facility: CLINIC | Age: 63
End: 2023-11-03
Payer: COMMERCIAL

## 2023-11-03 NOTE — PROGRESS NOTES
Yaniv, your diabetes is not in control at this time.  I tis better than 7 months ago but is still above target.  In addition, the thyroid appears to be overactive (hyperthyroid).  Both of these issues needs to be addressed by your endocrinologist at [place].  Can you schedule an appointment to see them ASAP and get these labs to him through his nurse?  Hyperthyroidism can cause you to feel very weak and fatigued so this is important.      The lipid panel is fine at this time so keep up the medication for that.     I have reviewed your BNP test.  This test helps us determine if you have evidence of congestive heart failure.  Your test was normal, though, so there is no indication that active congestive heart failure is present.

## 2023-11-06 PROBLEM — N39.0 ACUTE UTI: Status: RESOLVED | Noted: 2023-03-02 | Resolved: 2023-11-06

## 2023-11-09 ENCOUNTER — PATIENT MESSAGE (OUTPATIENT)
Dept: UROLOGY | Facility: CLINIC | Age: 63
End: 2023-11-09

## 2023-11-09 ENCOUNTER — PATIENT MESSAGE (OUTPATIENT)
Dept: FAMILY MEDICINE | Facility: CLINIC | Age: 63
End: 2023-11-09
Payer: COMMERCIAL

## 2023-11-09 ENCOUNTER — PROCEDURE VISIT (OUTPATIENT)
Dept: UROLOGY | Facility: CLINIC | Age: 63
End: 2023-11-09
Payer: COMMERCIAL

## 2023-11-09 DIAGNOSIS — N39.0 ACUTE UTI: ICD-10-CM

## 2023-11-09 DIAGNOSIS — C67.2 MALIGNANT NEOPLASM OF LATERAL WALL OF URINARY BLADDER: Primary | ICD-10-CM

## 2023-11-09 DIAGNOSIS — E66.01 MORBID OBESITY: Primary | ICD-10-CM

## 2023-11-09 PROCEDURE — 88112 CYTOPATH CELL ENHANCE TECH: CPT | Mod: 26,,, | Performed by: STUDENT IN AN ORGANIZED HEALTH CARE EDUCATION/TRAINING PROGRAM

## 2023-11-09 PROCEDURE — 52000 PR CYSTOURETHROSCOPY: ICD-10-PCS | Mod: S$GLB,,, | Performed by: UROLOGY

## 2023-11-09 PROCEDURE — 52000 CYSTOURETHROSCOPY: CPT | Mod: S$GLB,,, | Performed by: UROLOGY

## 2023-11-09 PROCEDURE — 88112 PR  CYTOPATH, CELL ENHANCE TECH: ICD-10-PCS | Mod: 26,,, | Performed by: STUDENT IN AN ORGANIZED HEALTH CARE EDUCATION/TRAINING PROGRAM

## 2023-11-09 PROCEDURE — 88112 CYTOPATH CELL ENHANCE TECH: CPT | Performed by: STUDENT IN AN ORGANIZED HEALTH CARE EDUCATION/TRAINING PROGRAM

## 2023-11-09 RX ORDER — CIPROFLOXACIN 500 MG/1
500 TABLET ORAL
Status: DISCONTINUED | OUTPATIENT
Start: 2023-11-09 | End: 2024-01-13

## 2023-11-09 NOTE — PROCEDURES
Procedures  Chief Complaint:   Encounter Diagnoses   Name Primary?    Malignant neoplasm of lateral wall of urinary bladder Yes    Acute UTI        HPI:    11/9/23- here for surveillance cystoscopy.    62-year-old female who comes in from [place] with a new diagnosis of possible bladder cancer.  She states this past summer she had difficulty voiding and a Tomas catheter was placed.  She did have a little bit hematuria afterwards, could have been related to the catheter placement.  She was doing well until on proximally Hank she began to have gross hematuria with no other significant symptoms.  This eventually cleared up on its own, CT scan demonstrated possible bladder masses and she was referred to an outside urologist.  Cystoscopy demonstrated significant tumor surrounding the right ureteral orifice, surgeries were attempted, but could not get scheduled due to other factors for preoperative clearance.  Patient is now cleared, but has decided to come to [facility] for treatment.  Patient is a current smoker, no other lower urinary tract symptoms.  No other urological or gynecological history, she has all of her female organs.  No evidence of true incontinence, no constipation.  She would an uncle with bladder cancer, no other family history of urological cancers or stones.    Allergies:  Penicillins    Medications:  has a current medication list which includes the following prescription(s): atorvastatin, azelastine, benzonatate, dexcom g6 sensor, cycloset, dexcom g6 sensor, dexcom g6 transmitter, ezetimibe, baqsimi, hyoscyamine, insulin, insulin aspart u-100, lisinopril, multivitamin, phenazopyridine, timolol maleate 0.5%, trulicity, and varenicline.    Review of Systems:  General: No fever, chills, fatigability, or weight loss.  Skin: No rashes, itching, or changes in color or texture of skin.  Chest: Denies EASON, cyanosis, wheezing, cough, and sputum production.  Abdomen: Appetite fine. No weight loss. Denies  diarrhea, abdominal pain, hematemesis, or blood in stool.  Musculoskeletal: No joint stiffness or swelling. Denies back pain.  : As above.  All other review of systems negative.    PMH:   has a past medical history of Asthma, Cigarette nicotine dependence without complication (2017), Colon polyp, DM (diabetes mellitus) type II uncontrolled with eye manifestation, Hyperplastic colon polyp (2015), Hypertension, Morbid obesity (10/08/2014), and Sleep apnea.    PSH:   has a past surgical history that includes  section; Eye surgery (Right); turbt (transurethral resection of bladder tumor) (N/A, 2023); and Cystoscopy with ureteroscopy, retrograde pyelography, and insertion of stent (Bilateral, 2023).    FamHx: family history includes Bladder Cancer in her paternal uncle; Coronary artery disease in her father; Diabetes in her father; Hypertension in her father; Lung cancer in her father; Lymphoma in her paternal uncle; Pancreatic cancer in her mother; Stroke in her father.    SocHx:  reports that she has been smoking cigarettes. She has a 27.75 pack-year smoking history. She has never used smokeless tobacco. She reports that she does not drink alcohol and does not use drugs.      Physical Exam:  There were no vitals filed for this visit.    General: A&Ox3, no apparent distress, no deformities  Neck: No masses, normal ROM  Lungs: normal inspiration, no use of accessory muscles  Heart: normal pulse, no arrhythmias  Abdomen: Soft, NT, ND, no masses, no hernias, no hepatosplenomegaly  Skin: The skin is warm and dry. No jaundice.  Ext: No c/c/e.  : - No pelvic floor prolapse.  Normal introitus, no urethral abnomralities. No Perineal abnormalities.    Labs/Studies:   Cytology negative   CT stone protocol right hydro, 4 mm right ureteral stone, 1.3 cm renal stone, 3 cm bladder mass   CT urogram right bladder wall tumor     Procedure: Diagnostic Cystoscopy    Procedure in Detail:  After proper consents were obtained, the patient was prepped and draped in normal sterile fashion for diagnostic cystoscopy. The flexible cystoscope was then introduced into the urethra, and advanced into the bladder under direct vision. The urethral mucosa appeared normal, and no strictures were noted. The sphincter appeared to be normal.  The bladder neck was normal. Inspection of the interior of the bladder was then carried out. The trigone was unremarkable, with no mucosal lesions. The ureteral orifices were normal in position and configuration. Systematic inspection of the mucosa of the bladder was then carried out, rotating the cystoscope so that all areas of the left and right lateral walls, the dome of the bladder, and the posterior wall were all visualized. The cystoscope was then advanced further into the bladder, and maximum deflection of the scope was performed so that the bladder neck could be inspected. No mucosal lesions were noted there. The cystoscope was then removed, and the procedure terminated.     Findings: normal cystoscopy       Impression/Plan:     1. Bladder cancer-  vesicolithalopaxy negative bx  5/2/23,  pTa high TURBT, retros normal  1/31/23    Cystoscopy is negative and will follow up on the cytology, if clear then see me in 3 months with cystoscopy and cytology as per surveillance protocol.  Call with any issues in the meantime.    2. Nephrolithiasis- clear and call with any issues.

## 2023-11-10 RX ORDER — CIPROFLOXACIN 500 MG/1
500 TABLET ORAL 2 TIMES DAILY
Qty: 6 TABLET | Refills: 0 | Status: SHIPPED | OUTPATIENT
Start: 2023-11-10 | End: 2023-11-13

## 2023-11-12 LAB
FINAL PATHOLOGIC DIAGNOSIS: NORMAL
Lab: NORMAL
MICROSCOPIC EXAM: NORMAL

## 2024-01-12 ENCOUNTER — PATIENT MESSAGE (OUTPATIENT)
Dept: UROLOGY | Facility: CLINIC | Age: 64
End: 2024-01-12
Payer: COMMERCIAL

## 2024-01-13 RX ORDER — LEVOFLOXACIN 500 MG/1
500 TABLET, FILM COATED ORAL DAILY
Qty: 10 TABLET | Refills: 0 | Status: SHIPPED | OUTPATIENT
Start: 2024-01-13 | End: 2024-01-23

## 2024-02-07 ENCOUNTER — TELEPHONE (OUTPATIENT)
Dept: FAMILY MEDICINE | Facility: CLINIC | Age: 64
End: 2024-02-07
Payer: COMMERCIAL

## 2024-02-07 ENCOUNTER — E-VISIT (OUTPATIENT)
Dept: FAMILY MEDICINE | Facility: CLINIC | Age: 64
End: 2024-02-07
Payer: COMMERCIAL

## 2024-02-07 DIAGNOSIS — R05.9 COUGH, UNSPECIFIED TYPE: Primary | ICD-10-CM

## 2024-02-07 PROCEDURE — 99421 OL DIG E/M SVC 5-10 MIN: CPT | Mod: ,,, | Performed by: FAMILY MEDICINE

## 2024-02-07 RX ORDER — AZELASTINE 1 MG/ML
2 SPRAY, METERED NASAL 2 TIMES DAILY
Qty: 30 ML | Refills: 11 | Status: SHIPPED | OUTPATIENT
Start: 2024-02-07

## 2024-02-07 RX ORDER — BENZONATATE 200 MG/1
200 CAPSULE ORAL 3 TIMES DAILY PRN
Qty: 30 CAPSULE | Refills: 0 | Status: SHIPPED | OUTPATIENT
Start: 2024-02-07 | End: 2024-03-13

## 2024-02-07 NOTE — PROGRESS NOTES
Patient ID: Chelsie Garrett is a 63 y.o. female.    Chief Complaint: URI (Entered automatically based on patient selection in Patient Portal.)    The patient initiated a request through [employer] on 2/7/2024 for evaluation and management with a chief complaint of URI (Entered automatically based on patient selection in Patient Portal.)     I evaluated the questionnaire submission on 02/07/2024.    [facility] Peq Evisit Upper Respitatory/Cough Questionnaire    2/7/2024  2:49 PM CST - Filed by Patient   Do you agree to participate in an E-Visit? Yes   If you have any of the following symptoms, please present to your local ER or call 911:  I acknowledge   What is the main issue that you would like for your doctor to address today? sinus drip, stuffed nose, productive cough   Are you able to take your vital signs? No   What symptoms do you currently have?  Cough;  Nasal Congestion;  Pain around the nose and face   Describe your cough: Productive (containing mucus)   Describe the mucus: Clear   Have you ever smoked? I have smoked in the past   Have you had a fever? No   When did your symptoms first appear? 2/4/2024   In the last two weeks, have you been in close contact with someone who has COVID-19 or the Flu? No   In the last two weeks, have you worked or volunteered in a healthcare facility or as a ? Healthcare facilities include a hospital, medical or dental clinic, long-term care facility, or nursing home No   Do you live in a long-term care facility, nursing home, group home, or homeless shelter? No   List what you have done or taken to help your symptoms. benedryl at night time   How severe are your symptoms? Moderate   Have your symptoms improved since they first appeared? No change   Have you taken an at home Covid test? Yes   What were the results? Negative   Have you taken a Flu test? No   Have you been fully vaccinated for COVID? (2 Pfizer, 2 Moderna or 1 Sudhakar & Sudhakar vaccine injections) Yes   Have  you received a booster? Yes   Have you recieved a Flu shot? Yes   When did you recieve your Flu shot? 2023   Do you have transportation to get tested for COVID if it is indicated and ordered for you at an [facility] location? Yes   Provide any information you feel is important to your history not asked above Symptoms are better but stuffed up nose and mouth breathing makes night time miserable   Please attach any relevant images or files          Encounter Diagnosis   Name Primary?    Cough, unspecified type Yes        No orders of the defined types were placed in this encounter.     Medications Ordered This Encounter   Medications    azelastine (ASTELIN) 137 mcg (0.1 %) nasal spray     Si sprays (274 mcg total) by Nasal route 2 (two) times daily.     Dispense:  30 mL     Refill:  11    benzonatate (TESSALON) 200 MG capsule     Sig: Take 1 capsule (200 mg total) by mouth 3 (three) times daily as needed for Cough.     Dispense:  30 capsule     Refill:  0        No follow-ups on file.      E-Visit Time Tracking:    Day 1 Time (in minutes): 5    Total Time (in minutes): 5

## 2024-02-07 NOTE — TELEPHONE ENCOUNTER
Informed pt she would need a visit prior to having a prescription sent in, offered an e-visit, pt accepted, link sent.

## 2024-02-07 NOTE — TELEPHONE ENCOUNTER
----- Message from Jillian Kiran sent at 2/7/2024  2:32 PM CST -----  Contact: IVANA SHAY [[id number]]  ..Type:  Patient Requesting Call    Who Called: IVANA SHAY [[id number]]  Does the patient know what this is regarding?: req meds for sinus infection  Would the patient rather a call back or a response via [facility]? call  Best Call Back Number: [phone]  Additional Information:     .  [facility] [facility] [place] - [address] [facility]  [address] [facility] [address]  Phone: [phone] Fax: [phone]

## 2024-02-12 PROBLEM — N39.0 ACUTE UTI: Status: RESOLVED | Noted: 2023-03-02 | Resolved: 2024-02-12

## 2024-02-14 ENCOUNTER — OFFICE VISIT (OUTPATIENT)
Dept: PODIATRY | Facility: CLINIC | Age: 64
End: 2024-02-14
Payer: COMMERCIAL

## 2024-02-14 VITALS — BODY MASS INDEX: 44.41 KG/M2 | HEIGHT: 68 IN | WEIGHT: 293 LBS

## 2024-02-14 DIAGNOSIS — L84 CORN OR CALLUS: ICD-10-CM

## 2024-02-14 DIAGNOSIS — E11.49 TYPE II DIABETES MELLITUS WITH NEUROLOGICAL MANIFESTATIONS: ICD-10-CM

## 2024-02-14 DIAGNOSIS — B35.1 ONYCHOMYCOSIS OF TOENAIL: ICD-10-CM

## 2024-02-14 DIAGNOSIS — E11.9 ENCOUNTER FOR COMPREHENSIVE DIABETIC FOOT EXAMINATION, TYPE 2 DIABETES MELLITUS: Primary | ICD-10-CM

## 2024-02-14 LAB
LEFT EYE DM RETINOPATHY: POSITIVE
RIGHT EYE DM RETINOPATHY: POSITIVE

## 2024-02-14 PROCEDURE — 1160F RVW MEDS BY RX/DR IN RCRD: CPT | Mod: CPTII,S$GLB,, | Performed by: PODIATRIST

## 2024-02-14 PROCEDURE — 99999 PR PBB SHADOW E&M-EST. PATIENT-LVL III: CPT | Mod: PBBFAC,,, | Performed by: PODIATRIST

## 2024-02-14 PROCEDURE — 99214 OFFICE O/P EST MOD 30 MIN: CPT | Mod: 25,S$GLB,, | Performed by: PODIATRIST

## 2024-02-14 PROCEDURE — 4010F ACE/ARB THERAPY RXD/TAKEN: CPT | Mod: CPTII,S$GLB,, | Performed by: PODIATRIST

## 2024-02-14 PROCEDURE — 3008F BODY MASS INDEX DOCD: CPT | Mod: CPTII,S$GLB,, | Performed by: PODIATRIST

## 2024-02-14 PROCEDURE — 1159F MED LIST DOCD IN RCRD: CPT | Mod: CPTII,S$GLB,, | Performed by: PODIATRIST

## 2024-02-14 RX ORDER — CICLOPIROX 80 MG/ML
SOLUTION TOPICAL
Qty: 6.6 ML | Refills: 6 | Status: SHIPPED | OUTPATIENT
Start: 2024-02-14

## 2024-02-14 NOTE — PROGRESS NOTES
Subjective:     Patient ID: Chelsie Garrett is a 63 y.o. female.    Chief Complaint: Callouses (C/o callous on feet, and nail care, pt c/o fungus on the nails, pt states the callous at the bottom of feet have pain , pt rates pain 1/10 but when they are in pain its 6/10 , pt is diabetic, pt last seen pcp 11/1/23, wears tennis and socks)    Chelsie is a 63 y.o. female who presents to the clinic for evaluation and treatment of high risk feet. Chelsie has a past medical history of Asthma, Cigarette nicotine dependence without complication (09/19/2017), Colon polyp, DM (diabetes mellitus) type II uncontrolled with eye manifestation, Hyperplastic colon polyp (02/05/2015), Hypertension, Morbid obesity (10/08/2014), and Sleep apnea. The patient's chief complaint is painful fungus on toenails and callus check. Patient rates pain 6/10. This patient has documented high risk feet requiring routine maintenance secondary to diabetes mellitis and those secondary complications of diabetes, as mentioned..    PCP: Yasir Emmanuel MD   Date Last Seen by PCP: 11/01/2023    Current shoe gear:  Affected Foot: Casual shoes     Unaffected Foot: Casual shoes    Hemoglobin A1C   Date Value Ref Range Status   11/02/2023 9.0 (H) 4.0 - 5.6 % Final     Comment:     ADA Screening Guidelines:  5.7-6.4%  Consistent with prediabetes  >or=6.5%  Consistent with diabetes    High levels of fetal hemoglobin interfere with the HbA1C  assay. Heterozygous hemoglobin variants (HbS, HgC, etc)do  not significantly interfere with this assay.   However, presence of multiple variants may affect accuracy.     03/30/2023 10.1 (H) 4.0 - 5.6 % Final     Comment:     ADA Screening Guidelines:  5.7-6.4%  Consistent with prediabetes  >or=6.5%  Consistent with diabetes    High levels of fetal hemoglobin interfere with the HbA1C  assay. Heterozygous hemoglobin variants (HbS, HgC, etc)do  not significantly interfere with this assay.   However, presence of multiple variants may  affect accuracy.     06/01/2022 10.0 (H) 4.0 - 5.6 % Final     Comment:     ADA Screening Guidelines:  5.7-6.4%  Consistent with prediabetes  >or=6.5%  Consistent with diabetes    High levels of fetal hemoglobin interfere with the HbA1C  assay. Heterozygous hemoglobin variants (HbS, HgC, etc)do  not significantly interfere with this assay.   However, presence of multiple variants may affect accuracy.         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 associated with type 2 diabetes mellitus    Glaucoma    Trigger finger of left thumb    Left wrist tendonitis    Trigger thumb of left hand    Left hand pain    Thumb joint stiffness    Cigarette nicotine dependence without complication    Plantar fasciitis    Actinic keratosis    Type II diabetes mellitus with neurological manifestations    Pain in right foot - Right Foot    Contusion of right heel    Smoker    Proteinuria    Preop cardiovascular exam    Bladder tumor    Obstructive sleep apnea    Restrictive lung disease    Malignant neoplasm of lateral wall of urinary bladder       Medication List with Changes/Refills   New Medications    CICLOPIROX (PENLAC) 8 % SOLN    Apply to nails once daily   Current Medications    ATORVASTATIN (LIPITOR) 80 MG TABLET    Take 1 tablet (80 mg total) by mouth every evening.    AZELASTINE (ASTELIN) 137 MCG (0.1 %) NASAL SPRAY    2 sprays (274 mcg total) by Nasal route 2 (two) times daily.    BENZONATATE (TESSALON) 200 MG CAPSULE    Take 1 capsule (200 mg total) by mouth 3 (three) times daily as needed for Cough.    BLOOD-GLUCOSE SENSOR (DEXCOM G6 SENSOR) ANDRÉS    Use as directed for continuous glucose  monitoring. Change sensor every 10 days.    BROMOCRIPTINE (CYCLOSET) 0.8 MG TAB    Take 3.2 mg by mouth.    DEXCOM G6 SENSOR ANDRÉS    SMARTSIG:Topical Every 10 Days    DEXCOM G6 TRANSMITTER ANDRÉS    Use as directed to check glucose. Change every 90 days.    EZETIMIBE (ZETIA) 10 MG TABLET    Take 1 tablet (10 mg total) by mouth every evening.    INSULIN ASPART U-100 (NOVOLOG FLEXPEN U-100 INSULIN) 100 UNIT/ML (3 ML) INPN PEN    THREE TIMES DAILY with meals per SLIDING SCALE    LISINOPRIL (PRINIVIL,ZESTRIL) 2.5 MG TABLET    Take 1 tablet (2.5 mg total) by mouth every evening.    MULTIVITAMIN CAPSULE    Take 1 capsule by mouth once daily.    OZEMPIC 0.25 MG OR 0.5 MG (2 MG/3 ML) PEN INJECTOR    Inject into the skin.    TIMOLOL MALEATE 0.5% (TIMOPTIC) 0.5 % DROP    Place 1 drop into both eyes 2 (two) times daily.       Review of patient's allergies indicates:   Allergen Reactions    Penicillins Anaphylaxis    Nuts [tree nut]        Past Surgical History:   Procedure Laterality Date     SECTION      X2    CYSTOSCOPIC LITHOLAPAXY N/A 2023    Procedure: CYSTOLITHOLAPAXY;  Surgeon: Marlon Clark MD;  Location: [facility] OR;  Service: Urology;  Laterality: N/A;    CYSTOSCOPY WITH URETEROSCOPY, RETROGRADE PYELOGRAPHY, AND INSERTION OF STENT Bilateral 2023    Procedure: CYSTOSCOPY, WITH RETROGRADE PYELOGRAM AND URETERAL STENT INSERTION;  Surgeon: Marlon Clark MD;  Location: [facility] OR;  Service: Urology;  Laterality: Bilateral;  Stent to right only    EYE SURGERY Right     ,     TURBT (TRANSURETHRAL RESECTION OF BLADDER TUMOR) N/A 2023    Procedure: TURBT (TRANSURETHRAL RESECTION OF BLADDER TUMOR);  Surgeon: Marlon Clark MD;  Location: [facility] OR;  Service: Urology;  Laterality: N/A;       Family History   Problem Relation Age of Onset    Pancreatic cancer Mother     Stroke Father     Coronary artery disease Father     Diabetes Father     Hypertension Father     Lung cancer Father      "Bladder Cancer Paternal Uncle     Lymphoma Paternal Uncle        Social History     Socioeconomic History    Marital status:    Occupational History     Employer: [employer]   Tobacco Use    Smoking status: Every Day     Current packs/day: 0.75     Average packs/day: 0.8 packs/day for 37.0 years (27.8 ttl pk-yrs)     Types: Cigarettes    Smokeless tobacco: Never    Tobacco comments:     HOLD MIDNIGHT PRIOR TO SURGERY   Substance and Sexual Activity    Alcohol use: Never    Drug use: No       Vitals:    02/14/24 1145   Weight: (!) 136.5 kg (301 lb)   Height: 5' 8" (1.727 m)   PainSc:   1       Hemoglobin A1C   Date Value Ref Range Status   11/02/2023 9.0 (H) 4.0 - 5.6 % Final     Comment:     ADA Screening Guidelines:  5.7-6.4%  Consistent with prediabetes  >or=6.5%  Consistent with diabetes    High levels of fetal hemoglobin interfere with the HbA1C  assay. Heterozygous hemoglobin variants (HbS, HgC, etc)do  not significantly interfere with this assay.   However, presence of multiple variants may affect accuracy.     03/30/2023 10.1 (H) 4.0 - 5.6 % Final     Comment:     ADA Screening Guidelines:  5.7-6.4%  Consistent with prediabetes  >or=6.5%  Consistent with diabetes    High levels of fetal hemoglobin interfere with the HbA1C  assay. Heterozygous hemoglobin variants (HbS, HgC, etc)do  not significantly interfere with this assay.   However, presence of multiple variants may affect accuracy.     06/01/2022 10.0 (H) 4.0 - 5.6 % Final     Comment:     ADA Screening Guidelines:  5.7-6.4%  Consistent with prediabetes  >or=6.5%  Consistent with diabetes    High levels of fetal hemoglobin interfere with the HbA1C  assay. Heterozygous hemoglobin variants (HbS, HgC, etc)do  not significantly interfere with this assay.   However, presence of multiple variants may affect accuracy.         Review of Systems   Constitutional:  Negative for chills and fever.   Respiratory:  Negative for shortness of " breath.    Cardiovascular:  Negative for chest pain, palpitations, orthopnea, claudication and leg swelling.   Gastrointestinal:  Negative for diarrhea, nausea and vomiting.   Musculoskeletal:  Negative for joint pain.   Skin:  Negative for rash.   Neurological:  Negative for dizziness, tingling, sensory change, focal weakness and weakness.   Psychiatric/Behavioral: Negative.               Objective:      PHYSICAL EXAM: Apperance: Alert and orient in no distress,well developed, and with good attention to grooming and body habits  Patient presents ambulating in tennis shoes.   LOWER EXTREMITY EXAM:  VASCULAR: Dorsalis pedis pulses 2/4 bilateral and Posterior Tibial pulses 1/4 bilateral. Capillary fill time <4 seconds bilateral. No edema observed bilateral. Varicosities absent bilateral. Skin temperature of the lower extremities is warm to warm, proximal to distal. Hair growth WNL bilateral.  DERMATOLOGICAL: No skin rashes, subcutaneous nodules, lesions, or ulcers observed bilateral. Nails 1,2,3,4,5 bilateral normal length. Bilateral 2nd toes thickened. Webspaces 1,2,3,4 bilateral clean, dry and without evidence of break in skin integrity. Minimal hyperkeratotic tissue noted to bilateral plantar 5th submetatarsal and bilateral plantar/medial hallux.   NEUROLOGICAL: Light touch, sharp-dull, proprioception all present and equal bilaterally.  Vibratory sensation intact at bilateral hallux. Protective sensation intact at all 10 sites as tested with a Columbia-Burton 5.07 monofilament.   MUSCULOSKELETAL: Muscle strength is 5/5 for foot inverters, everters, plantarflexors, and dorsiflexors. Muscle tone is normal.         Assessment:       ICD-10-CM ICD-9-CM   1. Encounter for comprehensive diabetic foot examination, type 2 diabetes mellitus  E11.9 250.00   2. Onychomycosis of toenail  B35.1 110.1   3. Type II diabetes mellitus with neurological manifestations  E11.49 250.60   4. [place] or callus  L84 700         Plan:    Encounter for comprehensive diabetic foot examination, type 2 diabetes mellitus    Onychomycosis of toenail  -     ciclopirox (PENLAC) 8 % Soln; Apply to nails once daily  Dispense: 6.6 mL; Refill: 6    Type II diabetes mellitus with neurological manifestations    [place] or callus        I counseled the patient on her conditions, regarding findings of my examination, my impressions, and usual treatment plan.   This visit spent on counseling and coordination of care.  Appointment spent on education about the diabetic foot, neuropathy, and prevention of limb loss.  Shoe inspection. Diabetic Foot Education. Patient reminded of the importance of good nutrition and blood sugar control to help prevent podiatric complications of diabetes. Patient instructed on proper foot hygeine. We discussed wearing proper shoe gear, daily foot inspections, never walking without protective shoe gear, never putting sharp instruments to feet.    Patient instructed to regularly file area to prevent build up. Patient also instructed to keep area moisturized.   Prescribed Penlac to be applied to nails once daily.   Patient  will continue to monitor the areas daily, inspect feet, wear protective shoe gear when ambulatory, moisturizer to maintain skin integrity. Patient reminded of the importance of good nutrition and blood sugar control to help prevent podiatric complications of diabetes.  Patient to return 12 months or sooner if needed.              Joe Rivera DPM  [facility]

## 2024-02-19 ENCOUNTER — PATIENT OUTREACH (OUTPATIENT)
Dept: ADMINISTRATIVE | Facility: HOSPITAL | Age: 64
End: 2024-02-19
Payer: COMMERCIAL

## 2024-02-26 ENCOUNTER — PROCEDURE VISIT (OUTPATIENT)
Dept: UROLOGY | Facility: CLINIC | Age: 64
End: 2024-02-26
Payer: COMMERCIAL

## 2024-02-26 DIAGNOSIS — C67.2 MALIGNANT NEOPLASM OF LATERAL WALL OF URINARY BLADDER: Primary | ICD-10-CM

## 2024-02-26 PROCEDURE — 88112 CYTOPATH CELL ENHANCE TECH: CPT | Performed by: PATHOLOGY

## 2024-02-26 PROCEDURE — 88112 CYTOPATH CELL ENHANCE TECH: CPT | Mod: 26,,, | Performed by: PATHOLOGY

## 2024-02-26 PROCEDURE — 52000 CYSTOURETHROSCOPY: CPT | Mod: S$GLB,,, | Performed by: UROLOGY

## 2024-02-26 RX ORDER — CIPROFLOXACIN 500 MG/1
500 TABLET ORAL
Status: DISCONTINUED | OUTPATIENT
Start: 2024-02-26 | End: 2024-06-13

## 2024-02-26 NOTE — PROCEDURES
Procedures  Chief Complaint:   Encounter Diagnosis   Name Primary?    Malignant neoplasm of lateral wall of urinary bladder Yes       HPI:    2/26/24- here for surveillance cystoscopy.  Still having cloudy urine.    62-year-old female who comes in from [place] with a new diagnosis of possible bladder cancer.  She states this past summer she had difficulty voiding and a Tomas catheter was placed.  She did have a little bit hematuria afterwards, could have been related to the catheter placement.  She was doing well until on proximally Hank she began to have gross hematuria with no other significant symptoms.  This eventually cleared up on its own, CT scan demonstrated possible bladder masses and she was referred to an outside urologist.  Cystoscopy demonstrated significant tumor surrounding the right ureteral orifice, surgeries were attempted, but could not get scheduled due to other factors for preoperative clearance.  Patient is now cleared, but has decided to come to [facility] for treatment.  Patient is a current smoker, no other lower urinary tract symptoms.  No other urological or gynecological history, she has all of her female organs.  No evidence of true incontinence, no constipation.  She would an uncle with bladder cancer, no other family history of urological cancers or stones.    Allergies:  Penicillins    Medications:  has a current medication list which includes the following prescription(s): atorvastatin, azelastine, benzonatate, dexcom g6 sensor, cycloset, dexcom g6 sensor, dexcom g6 transmitter, ezetimibe, baqsimi, hyoscyamine, insulin, insulin aspart u-100, lisinopril, multivitamin, phenazopyridine, timolol maleate 0.5%, trulicity, and varenicline.    Review of Systems:  General: No fever, chills, fatigability, or weight loss.  Skin: No rashes, itching, or changes in color or texture of skin.  Chest: Denies EASON, cyanosis, wheezing, cough, and sputum production.  Abdomen: Appetite fine. No weight  loss. Denies diarrhea, abdominal pain, hematemesis, or blood in stool.  Musculoskeletal: No joint stiffness or swelling. Denies back pain.  : As above.  All other review of systems negative.    PMH:   has a past medical history of Asthma, Cigarette nicotine dependence without complication (2017), Colon polyp, DM (diabetes mellitus) type II uncontrolled with eye manifestation, Hyperplastic colon polyp (2015), Hypertension, Morbid obesity (10/08/2014), and Sleep apnea.    PSH:   has a past surgical history that includes  section; Eye surgery (Right); turbt (transurethral resection of bladder tumor) (N/A, 2023); and Cystoscopy with ureteroscopy, retrograde pyelography, and insertion of stent (Bilateral, 2023).    FamHx: family history includes Bladder Cancer in her paternal uncle; Coronary artery disease in her father; Diabetes in her father; Hypertension in her father; Lung cancer in her father; Lymphoma in her paternal uncle; Pancreatic cancer in her mother; Stroke in her father.    SocHx:  reports that she has been smoking cigarettes. She has a 27.75 pack-year smoking history. She has never used smokeless tobacco. She reports that she does not drink alcohol and does not use drugs.      Physical Exam:  There were no vitals filed for this visit.    General: A&Ox3, no apparent distress, no deformities  Neck: No masses, normal ROM  Lungs: normal inspiration, no use of accessory muscles  Heart: normal pulse, no arrhythmias  Abdomen: Soft, NT, ND, no masses, no hernias, no hepatosplenomegaly  Skin: The skin is warm and dry. No jaundice.  Ext: No c/c/e.  : - No pelvic floor prolapse.  Normal introitus, no urethral abnomralities. No Perineal abnormalities.    Labs/Studies:   Cytology negative   CT stone protocol right hydro, 4 mm right ureteral stone, 1.3 cm renal stone, 3 cm bladder mass   CT urogram right bladder wall tumor   Procedure: Diagnostic Cystoscopy    Procedure  in Detail: After proper consents were obtained, the patient was prepped and draped in normal sterile fashion for diagnostic cystoscopy. The flexible cystoscope was then introduced into the urethra, and advanced into the bladder under direct vision. The urethral mucosa appeared normal, and no strictures were noted. The sphincter appeared to be normal.  The bladder neck was normal. Inspection of the interior of the bladder was then carried out. The trigone was unremarkable, with no mucosal lesions. The ureteral orifices were normal in position and configuration. Systematic inspection of the mucosa of the bladder was then carried out, rotating the cystoscope so that all areas of the left and right lateral walls, the dome of the bladder, and the posterior wall were all visualized. The cystoscope was then advanced further into the bladder, and maximum deflection of the scope was performed so that the bladder neck could be inspected. No mucosal lesions were noted there. The cystoscope was then removed, and the procedure terminated.     Findings: normal cystoscopy       Impression/Plan:     1. Bladder cancer-  vesicolithalopaxy negative bx  5/2/23,  pTa high TURBT, retros normal  1/31/23    Cystoscopy is negative and will follow up on the cytology, if clear then see me in 3 months with cystoscopy and cytology as per surveillance protocol.  We may increase to every 6 months after that.  Still with significant debris and she will try to hydrate to see if this clears, will also add cranberry juice or supplements, of note she does have occasional dysuria.  Call with any issues prior to the next appointment.    2. Nephrolithiasis- clear and call with any issues.

## 2024-02-27 LAB
FINAL PATHOLOGIC DIAGNOSIS: NORMAL
Lab: NORMAL

## 2024-03-04 ENCOUNTER — OFFICE VISIT (OUTPATIENT)
Dept: FAMILY MEDICINE | Facility: CLINIC | Age: 64
End: 2024-03-04
Payer: COMMERCIAL

## 2024-03-04 DIAGNOSIS — M65.311 TRIGGER FINGER OF RIGHT THUMB: Primary | ICD-10-CM

## 2024-03-04 PROCEDURE — 1160F RVW MEDS BY RX/DR IN RCRD: CPT | Mod: CPTII,95,, | Performed by: NURSE PRACTITIONER

## 2024-03-04 PROCEDURE — 99213 OFFICE O/P EST LOW 20 MIN: CPT | Mod: 95,,, | Performed by: NURSE PRACTITIONER

## 2024-03-04 PROCEDURE — 1159F MED LIST DOCD IN RCRD: CPT | Mod: CPTII,95,, | Performed by: NURSE PRACTITIONER

## 2024-03-04 PROCEDURE — 4010F ACE/ARB THERAPY RXD/TAKEN: CPT | Mod: CPTII,95,, | Performed by: NURSE PRACTITIONER

## 2024-03-04 NOTE — PROGRESS NOTES
"Subjective     Patient ID: Chelsie Garrett is a 63 y.o. female.    Chief Complaint: No chief complaint on file.  The patient location is: [place]  The chief complaint leading to consultation is: Trigger finger    Visit type: audiovisual    Face to Face time with patient: 15 min   minutes of total time spent on the encounter, which includes face to face time and non-face to face time preparing to see the patient (eg, review of tests), Obtaining and/or reviewing separately obtained history, Documenting clinical information in the electronic or other health record, Independently interpreting results (not separately reported) and communicating results to the patient/family/caregiver, or Care coordination (not separately reported).         Each patient to whom he or she provides medical services by telemedicine is:  (1) informed of the relationship between the physician and patient and the respective role of any other health care provider with respect to management of the patient; and (2) notified that he or she may decline to receive medical services by telemedicine and may withdraw from such care at any time.    Notes:     Hand Pain   The incident occurred more than 1 week ago. There was no injury mechanism. The pain is present in the right hand (right thumb (trigger finger)). The quality of the pain is described as aching. The pain does not radiate. The pain is mild. The pain has been Fluctuating since the incident. Pertinent negatives include no chest pain, muscle weakness, numbness or tingling. Associated symptoms comments: Thumb gets "stuck" in bent position. Nothing aggravates the symptoms. Treatments tried: NSAIDs, brace to thumb. The treatment provided no relief.     Past Medical History:   Diagnosis Date    Asthma     CHILDHOOD    Cigarette nicotine dependence without complication 09/19/2017    Colon polyp     DM (diabetes mellitus) type II uncontrolled with eye manifestation     Hyperplastic colon polyp " 02/05/2015    Hypertension     Morbid obesity 10/08/2014    The patient presents with obesity.  Denies bulimia, amenorrhea, cold intolerance, edema, hip pain, hirsutism, knee pain, polydipsia, polyuria, thirst and weakness.  The patient does not perform regular exercise.  Previous treatments for obesity :self-directed dieting with success.  The patient and I discussed the importance of exercise.    She has continued to gain weight.   Wt Readings from Last     Sleep apnea     positive test     Social History     Socioeconomic History    Marital status:    Occupational History     Employer: [employer]   Tobacco Use    Smoking status: Every Day     Current packs/day: 0.75     Average packs/day: 0.8 packs/day for 37.0 years (27.8 ttl pk-yrs)     Types: Cigarettes    Smokeless tobacco: Never    Tobacco comments:     HOLD MIDNIGHT PRIOR TO SURGERY   Substance and Sexual Activity    Alcohol use: Never    Drug use: No     Social Determinants of Health     Financial Resource Strain: Low Risk  (3/4/2024)    Overall Financial Resource Strain (CARDIA)     Difficulty of Paying Living Expenses: Not very hard   Food Insecurity: Unknown (3/4/2024)    Hunger Vital Sign     Worried About Running Out of Food in the Last Year: Never true   Transportation Needs: No Transportation Needs (3/4/2024)    PRAPARE - Transportation     Lack of Transportation (Medical): No     Lack of Transportation (Non-Medical): No   Stress: No Stress Concern Present (3/4/2024)    [employer] [place] [employer] - Occupational Stress Questionnaire     Feeling of Stress : Not at all   Social Connections: Unknown (3/4/2024)    Social Connection and Isolation Panel [NHANES]     Frequency of Communication with Friends and Family: More than three times a week     Frequency of Social Gatherings with Friends and Family: Once a week     Active Member of Clubs or Organizations: Yes     Attends Club or Organization Meetings: More than 4  times per year     Marital Status:    Housing Stability: Low Risk  (3/4/2024)    Housing Stability Vital Sign     Unable to Pay for Housing in the Last Year: No     Number of Places Lived in the Last Year: 1     Unstable Housing in the Last Year: No     Past Surgical History:   Procedure Laterality Date     SECTION      X2    CYSTOSCOPIC LITHOLAPAXY N/A 2023    Procedure: CYSTOLITHOLAPAXY;  Surgeon: Marlon Clark MD;  Location: [facility] OR;  Service: Urology;  Laterality: N/A;    CYSTOSCOPY WITH URETEROSCOPY, RETROGRADE PYELOGRAPHY, AND INSERTION OF STENT Bilateral 2023    Procedure: CYSTOSCOPY, WITH RETROGRADE PYELOGRAM AND URETERAL STENT INSERTION;  Surgeon: Marlon Clark MD;  Location: [facility] OR;  Service: Urology;  Laterality: Bilateral;  Stent to right only    EYE SURGERY Right     ,     TURBT (TRANSURETHRAL RESECTION OF BLADDER TUMOR) N/A 2023    Procedure: TURBT (TRANSURETHRAL RESECTION OF BLADDER TUMOR);  Surgeon: Marlon Clark MD;  Location: [facility] OR;  Service: Urology;  Laterality: N/A;       Review of Systems   Constitutional: Negative.  Negative for activity change and unexpected weight change.   HENT: Negative.  Negative for hearing loss, rhinorrhea and trouble swallowing.    Eyes: Negative.  Negative for discharge.   Respiratory: Negative.  Negative for chest tightness and wheezing.    Cardiovascular: Negative.  Negative for chest pain and palpitations.   Gastrointestinal: Negative.  Negative for blood in stool, constipation, diarrhea and vomiting.   Endocrine: Negative.  Negative for polydipsia and polyuria.   Genitourinary: Negative.  Negative for difficulty urinating, dysuria, hematuria and menstrual problem.   Musculoskeletal:  Positive for arthralgias (right thumb (trigger finger)). Negative for joint swelling and neck pain.   Integumentary:  Negative.   Allergic/Immunologic: Negative.    Neurological: Negative.  Negative for tingling, weakness,  numbness and headaches.   Psychiatric/Behavioral: Negative.  Negative for confusion and dysphoric mood.           Objective     Physical Exam  Constitutional:       Appearance: Normal appearance.   Neurological:      Mental Status: She is alert.            Assessment and Plan     1. Trigger finger of right thumb  -     Ambulatory referral/consult to Orthopedics; Future; Expected date: 03/11/2024  RTC as needed  Report to ER immediately if symptoms worsen or persist               No follow-ups on file.

## 2024-03-06 DIAGNOSIS — M79.641 RIGHT HAND PAIN: Primary | ICD-10-CM

## 2024-03-11 ENCOUNTER — OFFICE VISIT (OUTPATIENT)
Dept: ORTHOPEDICS | Facility: CLINIC | Age: 64
End: 2024-03-11
Payer: COMMERCIAL

## 2024-03-11 ENCOUNTER — HOSPITAL ENCOUNTER (OUTPATIENT)
Dept: RADIOLOGY | Facility: HOSPITAL | Age: 64
Discharge: HOME | End: 2024-03-11
Attending: STUDENT IN AN ORGANIZED HEALTH CARE EDUCATION/TRAINING PROGRAM
Payer: COMMERCIAL

## 2024-03-11 VITALS — HEIGHT: 68 IN | WEIGHT: 293 LBS | BODY MASS INDEX: 44.41 KG/M2

## 2024-03-11 DIAGNOSIS — M79.641 RIGHT HAND PAIN: ICD-10-CM

## 2024-03-11 DIAGNOSIS — E11.39 TYPE 2 DIABETES MELLITUS WITH OTHER DIABETIC OPHTHALMIC COMPLICATION: ICD-10-CM

## 2024-03-11 DIAGNOSIS — E78.2 COMBINED HYPERLIPIDEMIA ASSOCIATED WITH TYPE 2 DIABETES MELLITUS: ICD-10-CM

## 2024-03-11 DIAGNOSIS — M65.311 TRIGGER FINGER OF RIGHT THUMB: ICD-10-CM

## 2024-03-11 DIAGNOSIS — E11.69 COMBINED HYPERLIPIDEMIA ASSOCIATED WITH TYPE 2 DIABETES MELLITUS: ICD-10-CM

## 2024-03-11 PROCEDURE — 99203 OFFICE O/P NEW LOW 30 MIN: CPT | Mod: 25,S$GLB,, | Performed by: STUDENT IN AN ORGANIZED HEALTH CARE EDUCATION/TRAINING PROGRAM

## 2024-03-11 PROCEDURE — 1159F MED LIST DOCD IN RCRD: CPT | Mod: CPTII,S$GLB,, | Performed by: STUDENT IN AN ORGANIZED HEALTH CARE EDUCATION/TRAINING PROGRAM

## 2024-03-11 PROCEDURE — 1160F RVW MEDS BY RX/DR IN RCRD: CPT | Mod: CPTII,S$GLB,, | Performed by: STUDENT IN AN ORGANIZED HEALTH CARE EDUCATION/TRAINING PROGRAM

## 2024-03-11 PROCEDURE — 99999 PR PBB SHADOW E&M-EST. PATIENT-LVL IV: CPT | Mod: PBBFAC,,, | Performed by: STUDENT IN AN ORGANIZED HEALTH CARE EDUCATION/TRAINING PROGRAM

## 2024-03-11 PROCEDURE — 4010F ACE/ARB THERAPY RXD/TAKEN: CPT | Mod: CPTII,S$GLB,, | Performed by: STUDENT IN AN ORGANIZED HEALTH CARE EDUCATION/TRAINING PROGRAM

## 2024-03-11 PROCEDURE — 73130 X-RAY EXAM OF HAND: CPT | Mod: TC,PO,RT

## 2024-03-11 PROCEDURE — 3008F BODY MASS INDEX DOCD: CPT | Mod: CPTII,S$GLB,, | Performed by: STUDENT IN AN ORGANIZED HEALTH CARE EDUCATION/TRAINING PROGRAM

## 2024-03-11 PROCEDURE — 73130 X-RAY EXAM OF HAND: CPT | Mod: 26,RT,, | Performed by: RADIOLOGY

## 2024-03-11 PROCEDURE — 20550 NJX 1 TENDON SHEATH/LIGAMENT: CPT | Mod: RT,S$GLB,, | Performed by: STUDENT IN AN ORGANIZED HEALTH CARE EDUCATION/TRAINING PROGRAM

## 2024-03-11 PROCEDURE — 76942 ECHO GUIDE FOR BIOPSY: CPT | Mod: S$GLB,,, | Performed by: STUDENT IN AN ORGANIZED HEALTH CARE EDUCATION/TRAINING PROGRAM

## 2024-03-11 RX ORDER — BETAMETHASONE SODIUM PHOSPHATE AND BETAMETHASONE ACETATE 3; 3 MG/ML; MG/ML
6 INJECTION, SUSPENSION INTRA-ARTICULAR; INTRALESIONAL; INTRAMUSCULAR; SOFT TISSUE
Status: DISCONTINUED | OUTPATIENT
Start: 2024-03-11 | End: 2024-03-11 | Stop reason: HOSPADM

## 2024-03-11 RX ADMIN — BETAMETHASONE SODIUM PHOSPHATE AND BETAMETHASONE ACETATE 6 MG: 3; 3 INJECTION, SUSPENSION INTRA-ARTICULAR; INTRALESIONAL; INTRAMUSCULAR; SOFT TISSUE at 10:03

## 2024-03-11 NOTE — PROCEDURES
Tendon Sheath    Date/Time: 3/11/2024 10:20 AM    Performed by: Isidoro Dyer MD  Authorized by: Isidoro Dyer MD    Consent Done?:  Yes (Verbal)  Indications:  Pain  Site marked: the procedure site was marked    Timeout: prior to procedure the correct patient, procedure, and site was verified    Prep: patient was prepped and draped in usual sterile fashion      Local anesthetic:  Lidocaine 1% without epinephrine and bupivacaine 0.5% without epinephrine  Location:  Thumb  Site:  R thumb flexor tendon sheath  Ultrasonic guidance for needle placement?: Yes    Needle size:  25 G  Approach:  Volar  Medications:  6 mg betamethasone acetate-betamethasone sodium phosphate 6 mg/mL  Patient tolerance:  Patient tolerated the procedure well with no immediate complications    Additional Comments: Ultrasound guidance was used for needle localization. Images were saved and stored for documentation. The appropriate structures were visualized. Dynamic visualization of the needle was continuous throughout the procedures and maintained good position.

## 2024-03-11 NOTE — PATIENT INSTRUCTIONS
Assessment:  Chelsie Garrett is a 63 y.o. female   Chief Complaint   Patient presents with    Right Hand - Pain       Encounter Diagnosis   Name Primary?    Trigger finger of right thumb         Plan:  Ultrasound guided cortisone injection to the right trigger thumb  We discussed the proper protocols after the injection such as no submerging pools, baths tubs, or hot tubs for 24 hr.  Showering is okay today.  We also discussed that blood sugars can be elevated after an injection and asked patient to properly checked her sugars over the next few days and contact their PCP if there are any concerns.  We discussed red flags such as fevers, chills, red, warm, tender joint at the area of injection to please seek medical care immediately.    Follow up as needed    Follow-up: as needed.    Thank you for choosing [facility] and Dr. Isidoro Dyer for your orthopedic & sports medicine care. It is our goal to provide you with exceptional care that will help keep you healthy, active, and get you back in the game.    Please do not hesitate to reach out to us via email, phone, or MyChart with any questions, concerns, or feedback.    If you felt that you received exemplary care today, please consider leaving us feedback on [place] at:  [email or web address]    If you are experiencing pain/discomfort ,or have questions after 5pm and would like to be connected to the [facility] on-call team, please call this number and specify which Sports Medicine provider is treating you: [phone]

## 2024-03-11 NOTE — PROCEDURES
Sports Medicine US - Guidance for Needle Placement    Date/Time: 3/11/2024 10:20 AM    Performed by: Isidoro Dyer MD  Authorized by: Isidoro Dyer MD  Preparation: Patient was prepped and draped in the usual sterile fashion.  Local anesthesia used: no    Anesthesia:  Local anesthesia used: no    Sedation:  Patient sedated: no    Patient tolerance: patient tolerated the procedure well with no immediate complications  Comments: Ultrasound guidance was used for needle localization. Images were saved and stored for documentation. The appropriate structures were visualized. Dynamic visualization of the needle was continuous throughout the procedures and maintained good position.

## 2024-03-11 NOTE — PROGRESS NOTES
Patient ID: Chelsie Garrett  YOB: 1960  MRN: [id number]    Chief Complaint: Pain of the Right Hand      Referred By: Lucina Dougherty DNP for Right Trigger Thumb    History of Present Illness: Chelsie Garrett is a right-hand dominant 63 y.o. female who presents today with right trigger thumb that has been present for one week and three days.  Patient reports that her pain today is a 3/10 and that she has been wearing a Figure 8 splint for the last week.  She has taken OTC Ibuprofen without relief of symptoms.  Patient states that triggering occurs every time she bends her thumb.        The patient is active in none.  Occupation:  / Website Manager      Past Medical History:   Past Medical History:   Diagnosis Date    Asthma     CHILDHOOD    Cigarette nicotine dependence without complication 2017    Colon polyp     DM (diabetes 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  Denies bulimia, amenorrhea, cold intolerance, edema, hip pain, hirsutism, knee pain, polydipsia, polyuria, thirst and weakness.  The patient does not perform regular exercise.  Previous treatments for obesity :self-directed dieting with success.  The patient and I discussed the importance of exercise.    She has continued to gain weight.   Wt Readings from Last     Sleep apnea     positive test     Past Surgical History:   Procedure Laterality Date     SECTION      X2    CYSTOSCOPIC LITHOLAPAXY N/A 2023    Procedure: CYSTOLITHOLAPAXY;  Surgeon: Marlon Clark MD;  Location: [facility] OR;  Service: Urology;  Laterality: N/A;    CYSTOSCOPY WITH URETEROSCOPY, RETROGRADE PYELOGRAPHY, AND INSERTION OF STENT Bilateral 2023    Procedure: CYSTOSCOPY, WITH RETROGRADE PYELOGRAM AND URETERAL STENT INSERTION;  Surgeon: Marlon Clark MD;  Location: [facility] OR;   Service: Urology;  Laterality: Bilateral;  Stent to right only    EYE SURGERY Right     2013, 2014    TURBT (TRANSURETHRAL RESECTION OF BLADDER TUMOR) N/A 1/31/2023    Procedure: TURBT (TRANSURETHRAL RESECTION OF BLADDER TUMOR);  Surgeon: Marlon Clark MD;  Location: [facility];  Service: Urology;  Laterality: N/A;     Family History   Problem Relation Age of Onset    Pancreatic cancer Mother     Stroke Father     Coronary artery disease Father     Diabetes Father     Hypertension Father     Lung cancer Father     Bladder Cancer Paternal Uncle     Lymphoma Paternal Uncle      Social History     Socioeconomic History    Marital status:    Occupational History     Employer: [employer]   Tobacco Use    Smoking status: Every Day     Current packs/day: 0.75     Average packs/day: 0.8 packs/day for 37.0 years (27.8 ttl pk-yrs)     Types: Cigarettes    Smokeless tobacco: Never    Tobacco comments:     HOLD MIDNIGHT PRIOR TO SURGERY   Substance and Sexual Activity    Alcohol use: Never    Drug use: No     Social Determinants of Health     Financial Resource Strain: Low Risk  (3/4/2024)    Overall Financial Resource Strain (CARDIA)     Difficulty of Paying Living Expenses: Not very hard   Food Insecurity: Unknown (3/4/2024)    Hunger Vital Sign     Worried About Running Out of Food in the Last Year: Never true   Transportation Needs: No Transportation Needs (3/4/2024)    PRAPARE - Transportation     Lack of Transportation (Medical): No     Lack of Transportation (Non-Medical): No   Stress: No Stress Concern Present (3/4/2024)    [employer] [employer] [employer]ational [employer] - Occupational Stress Questionnaire     Feeling of Stress : Not at all   Social Connections: Unknown (3/4/2024)    Social Connection and Isolation Panel [NHANES]     Frequency of Communication with Friends and Family: More than three times a week     Frequency of Social Gatherings with Friends and Family: Once a week     Active Member  of Clubs or Organizations: Yes     Attends Club or Organization Meetings: More than 4 times per year     Marital Status:    Housing Stability: Low Risk  (3/4/2024)    Housing Stability Vital Sign     Unable to Pay for Housing in the Last Year: No     Number of Places Lived in the Last Year: 1     Unstable Housing in the Last Year: No     Medication List with Changes/Refills   Current Medications    ATORVASTATIN (LIPITOR) 80 MG TABLET    Take 1 tablet (80 mg total) by mouth every evening.    AZELASTINE (ASTELIN) 137 MCG (0.1 %) NASAL SPRAY    2 sprays (274 mcg total) by Nasal route 2 (two) times daily.    BENZONATATE (TESSALON) 200 MG CAPSULE    Take 1 capsule (200 mg total) by mouth 3 (three) times daily as needed for Cough.    BLOOD-GLUCOSE SENSOR (DEXCOM G6 SENSOR) ANDRÉS    Use as directed for continuous glucose monitoring. Change sensor every 10 days.    BROMOCRIPTINE (CYCLOSET) 0.8 MG TAB    Take 3.2 mg by mouth.    CICLOPIROX (PENLAC) 8 % SOLN    Apply to nails once daily    DEXCOM G6 SENSOR ANDRÉS    SMARTSIG:Topical Every 10 Days    DEXCOM G6 TRANSMITTER ANDRÉS    Use as directed to check glucose. Change every 90 days.    EZETIMIBE (ZETIA) 10 MG TABLET    Take 1 tablet (10 mg total) by mouth every evening.    INSULIN ASPART U-100 (NOVOLOG FLEXPEN U-100 INSULIN) 100 UNIT/ML (3 ML) INPN PEN    THREE TIMES DAILY with meals per SLIDING SCALE    LISINOPRIL (PRINIVIL,ZESTRIL) 2.5 MG TABLET    Take 1 tablet (2.5 mg total) by mouth every evening.    MULTIVITAMIN CAPSULE    Take 1 capsule by mouth once daily.    OZEMPIC 0.25 MG OR 0.5 MG (2 MG/3 ML) PEN INJECTOR    Inject into the skin.    TIMOLOL MALEATE 0.5% (TIMOPTIC) 0.5 % DROP    Place 1 drop into both eyes 2 (two) times daily.     Review of patient's allergies indicates:   Allergen Reactions    Penicillins Anaphylaxis    Nuts [tree nut]      Pine Nuts       Physical Exam:   Body mass index is 45.77 kg/m².    GENERAL: Well appearing, in no acute  distress.  HEAD: Normocephalic and atraumatic.  ENT: External ears and nose grossly normal.  EYES: EOMI bilaterally  PULMONARY: Respirations are grossly even and non-labored.  NEURO: Awake, alert, and oriented x 3.  SKIN: No obvious rashes appreciated.  PSYCH: Mood & affect are appropriate.    Detailed MSK exam:     Right hand/wrist exam:   -TTP: 1st MCP with active triggering  -Swelling/ecchymosis: none  -Full ROM  - strength 5/5  -Sensation intact  -Pulses 2+  -Rotational deformity: negative  -Tinel sign: negative  -Phalen sign: negative  -Finkelstein sign: negative      Imaging:  X-Ray Hand 3 View Right  Narrative: EXAMINATION:  XR HAND COMPLETE 3 VIEW RIGHT    CLINICAL HISTORY:  Pain in right hand    TECHNIQUE:  PA, lateral, and oblique views of the right hand were performed.    COMPARISON:  None    FINDINGS:  No acute fracture or dislocation.  Impression: No acute finding.    Electronically signed by: Arcenio Carter  Date:    03/11/2024  Time:    12:45        Relevant imaging results were reviewed and interpreted by me and per my read shows no acute abnormalities.  This was discussed with the patient and / or family today.     Assessment:  Chelsie Garrett is a 63 y.o. female presenting with right thumb pain.   History, physical and radiographs are consistent with a likely diagnosis of right trigger thumb.   Plan: Steroid injection given today (see separate procedure note for details). We discussed the proper protocols after the injection such as no submerging pools, baths tubs, or hot tubs for 24 hr.  Showering is okay today.  We also discussed that blood sugars can be elevated after an injection and asked patient to properly checked her sugars over the next few days and contact their PCP if there are any concerns.  We discussed red flags such as fevers, chills, red, warm, tender joint at the area of injection to please seek medical care immediately.   Trigger thumb splint at night. Continue conservative  management for pain.   Follow up as needed. All questions answered.      Trigger finger of right thumb  -     Ambulatory referral/consult to Orthopedics  -     Sports Medicine US - Guidance for Needle Placement  -     Tendon Sheath         Ultrasound guidance was used for needle localization. Images were saved and stored for documentation. The appropriate structures were visualized. Dynamic visualization of the needle was continuous throughout the procedures and maintained good position.      A copy of today's visit note has been sent to the referring provider.     Electronically signed:  Isidoro Dyer MD, MPH  03/11/2024  12:42 PM

## 2024-03-11 NOTE — TELEPHONE ENCOUNTER
Care Due:                  Date            Visit Type   Department     Provider  --------------------------------------------------------------------------------                                MYCHART                              FOLLOWUP/OF  [facility]  Last Visit: 11-      FICE VISIT   MEDICINE       Yasir Emmanuel  Next Visit: None Scheduled  None         None Found                                                            Last  Test          Frequency    Reason                     Performed    Due Date  --------------------------------------------------------------------------------    HBA1C.......  6 months...  insulin..................  11- 04-    [facility] Embedded Care Due Messages. Reference number: [id number].   3/11/2024 3:37:31 PM CDT

## 2024-03-13 RX ORDER — LISINOPRIL 2.5 MG/1
2.5 TABLET ORAL NIGHTLY
Qty: 90 TABLET | Refills: 2 | Status: SHIPPED | OUTPATIENT
Start: 2024-03-13

## 2024-03-13 RX ORDER — EZETIMIBE 10 MG/1
10 TABLET ORAL NIGHTLY
Qty: 90 TABLET | Refills: 2 | Status: SHIPPED | OUTPATIENT
Start: 2024-03-13

## 2024-03-13 RX ORDER — ATORVASTATIN CALCIUM 80 MG/1
80 TABLET, FILM COATED ORAL NIGHTLY
Qty: 90 TABLET | Refills: 2 | Status: SHIPPED | OUTPATIENT
Start: 2024-03-13

## 2024-03-13 RX ORDER — BENZONATATE 200 MG/1
200 CAPSULE ORAL
Qty: 30 CAPSULE | Refills: 3 | Status: SHIPPED | OUTPATIENT
Start: 2024-03-13

## 2024-03-13 NOTE — TELEPHONE ENCOUNTER
Refill Routing Note   Medication(s) are not appropriate for processing by [facility] for the following reason(s):        Outside of protocol    [facility] action(s):  Route  Approve               Appointments  past 12m or future 3m with PCP    Date Provider   Last Visit   2/7/2024 Yasir Emmanuel MD   Next Visit   Visit date not found Yasir Emmanuel MD   ED visits in past 90 days: 0        Note composed:9:09 AM 03/13/2024

## 2024-03-14 ENCOUNTER — TELEPHONE (OUTPATIENT)
Dept: PHARMACY | Facility: CLINIC | Age: 64
End: 2024-03-14
Payer: COMMERCIAL

## 2024-03-20 ENCOUNTER — TELEPHONE (OUTPATIENT)
Dept: SPORTS MEDICINE | Facility: CLINIC | Age: 64
End: 2024-03-20
Payer: COMMERCIAL

## 2024-03-20 NOTE — TELEPHONE ENCOUNTER
I returned pt's call, I told her to give the injection another week or 2 to get full benefit from it. I told her to call our office if she doesn't get any relief within that time. I will schedule her with our hand specialist to discuss surgery.

## 2024-03-20 NOTE — TELEPHONE ENCOUNTER
----- Message from Tata Irene sent at 3/20/2024 12:11 PM CDT -----  Contact: Chelsie  Patient is calling stating the injection did not work and patient need to know what to do from here. Please callback [phone] or leave message on portal.

## 2024-03-28 ENCOUNTER — PATIENT MESSAGE (OUTPATIENT)
Dept: ORTHOPEDICS | Facility: CLINIC | Age: 64
End: 2024-03-28
Payer: COMMERCIAL

## 2024-03-28 ENCOUNTER — PATIENT MESSAGE (OUTPATIENT)
Dept: PODIATRY | Facility: CLINIC | Age: 64
End: 2024-03-28
Payer: COMMERCIAL

## 2024-04-04 DIAGNOSIS — M65.311 TRIGGER FINGER OF RIGHT THUMB: ICD-10-CM

## 2024-04-04 DIAGNOSIS — M79.641 RIGHT HAND PAIN: Primary | ICD-10-CM

## 2024-04-09 ENCOUNTER — LAB VISIT (OUTPATIENT)
Dept: LAB | Facility: HOSPITAL | Age: 64
End: 2024-04-09
Attending: ORTHOPAEDIC SURGERY
Payer: COMMERCIAL

## 2024-04-09 ENCOUNTER — OFFICE VISIT (OUTPATIENT)
Dept: ORTHOPEDICS | Facility: CLINIC | Age: 64
End: 2024-04-09
Payer: COMMERCIAL

## 2024-04-09 VITALS — HEIGHT: 68 IN | WEIGHT: 293 LBS | BODY MASS INDEX: 44.41 KG/M2

## 2024-04-09 DIAGNOSIS — Z01.818 PREOP TESTING: ICD-10-CM

## 2024-04-09 DIAGNOSIS — M79.641 RIGHT HAND PAIN: ICD-10-CM

## 2024-04-09 DIAGNOSIS — M65.311 TRIGGER FINGER OF RIGHT THUMB: ICD-10-CM

## 2024-04-09 DIAGNOSIS — E11.49 TYPE II DIABETES MELLITUS WITH NEUROLOGICAL MANIFESTATIONS: ICD-10-CM

## 2024-04-09 DIAGNOSIS — Z01.818 PREOP TESTING: Primary | ICD-10-CM

## 2024-04-09 LAB
ESTIMATED AVG GLUCOSE: 217 MG/DL (ref 68–131)
HBA1C MFR BLD: 9.2 % (ref 4–5.6)

## 2024-04-09 PROCEDURE — 99999 PR PBB SHADOW E&M-EST. PATIENT-LVL III: CPT | Mod: PBBFAC,,, | Performed by: ORTHOPAEDIC SURGERY

## 2024-04-09 PROCEDURE — 99204 OFFICE O/P NEW MOD 45 MIN: CPT | Mod: S$GLB,,, | Performed by: ORTHOPAEDIC SURGERY

## 2024-04-09 PROCEDURE — 4010F ACE/ARB THERAPY RXD/TAKEN: CPT | Mod: CPTII,S$GLB,, | Performed by: ORTHOPAEDIC SURGERY

## 2024-04-09 PROCEDURE — 1159F MED LIST DOCD IN RCRD: CPT | Mod: CPTII,S$GLB,, | Performed by: ORTHOPAEDIC SURGERY

## 2024-04-09 PROCEDURE — 3008F BODY MASS INDEX DOCD: CPT | Mod: CPTII,S$GLB,, | Performed by: ORTHOPAEDIC SURGERY

## 2024-04-09 PROCEDURE — 36415 COLL VENOUS BLD VENIPUNCTURE: CPT | Performed by: ORTHOPAEDIC SURGERY

## 2024-04-09 PROCEDURE — 83036 HEMOGLOBIN GLYCOSYLATED A1C: CPT | Performed by: ORTHOPAEDIC SURGERY

## 2024-04-09 NOTE — PROGRESS NOTES
Subjective:     Patient ID: Chelsie Garrett is a 63 y.o. female.    Chief Complaint: Pain of the Right Hand      HPI:  The patient is a 63-year-old female with a right trigger thumb last injected 2024 with limited improvement.  She requests surgical release.    Past Medical History:   Diagnosis Date    Asthma     CHILDHOOD    Cigarette nicotine dependence without complication 2017    Colon polyp     DM (diabetes 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  Denies bulimia, amenorrhea, cold intolerance, edema, hip pain, hirsutism, knee pain, polydipsia, polyuria, thirst and weakness.  The patient does not perform regular exercise.  Previous treatments for obesity :self-directed dieting with success.  The patient and I discussed the importance of exercise.    She has continued to gain weight.   Wt Readings from Last     Sleep apnea     positive test     Past Surgical History:   Procedure Laterality Date     SECTION      X2    CYSTOSCOPIC LITHOLAPAXY N/A 2023    Procedure: CYSTOLITHOLAPAXY;  Surgeon: Marlon Clark MD;  Location: [facility] OR;  Service: Urology;  Laterality: N/A;    CYSTOSCOPY WITH URETEROSCOPY, RETROGRADE PYELOGRAPHY, AND INSERTION OF STENT Bilateral 2023    Procedure: CYSTOSCOPY, WITH RETROGRADE PYELOGRAM AND URETERAL STENT INSERTION;  Surgeon: Marlon Clark MD;  Location: [facility] OR;  Service: Urology;  Laterality: Bilateral;  Stent to right only    EYE SURGERY Right     ,     TURBT (TRANSURETHRAL RESECTION OF BLADDER TUMOR) N/A 2023    Procedure: TURBT (TRANSURETHRAL RESECTION OF BLADDER TUMOR);  Surgeon: Marlon Clark MD;  Location: [facility] OR;  Service: Urology;  Laterality: N/A;     Family History   Problem Relation Age of Onset    Pancreatic cancer Mother     Stroke Father     Coronary artery disease Father      Diabetes Father     Hypertension Father     Lung cancer Father     Bladder Cancer Paternal Uncle     Lymphoma Paternal Uncle      Social History     Socioeconomic History    Marital status:    Occupational History     Employer: [employer]   Tobacco Use    Smoking status: Every Day     Current packs/day: 0.75     Average packs/day: 0.8 packs/day for 37.0 years (27.8 ttl pk-yrs)     Types: Cigarettes    Smokeless tobacco: Never    Tobacco comments:     HOLD MIDNIGHT PRIOR TO SURGERY   Substance and Sexual Activity    Alcohol use: Never    Drug use: No     Social Determinants of Health     Financial Resource Strain: Low Risk  (3/4/2024)    Overall Financial Resource Strain (CARDIA)     Difficulty of Paying Living Expenses: Not very hard   Food Insecurity: Unknown (3/4/2024)    Hunger Vital Sign     Worried About Running Out of Food in the Last Year: Never true   Transportation Needs: No Transportation Needs (3/4/2024)    PRAPARE - Transportation     Lack of Transportation (Medical): No     Lack of Transportation (Non-Medical): No   Stress: No Stress Concern Present (3/4/2024)    [employer] - Occupational Stress Questionnaire     Feeling of Stress : Not at all   Social Connections: Unknown (3/4/2024)    Social Connection and Isolation Panel [NHANES]     Frequency of Communication with Friends and Family: More than three times a week     Frequency of Social Gatherings with Friends and Family: Once a week     Active Member of Clubs or Organizations: Yes     Attends Club or Organization Meetings: More than 4 times per year     Marital Status:    Housing Stability: Low Risk  (3/4/2024)    Housing Stability Vital Sign     Unable to Pay for Housing in the Last Year: No     Number of Places Lived in the Last Year: 1     Unstable Housing in the Last Year: No     Medication List with Changes/Refills   Current Medications    ATORVASTATIN (LIPITOR) 80 MG TABLET    TAKE 1  TABLET BY MOUTH EVERY EVENING    AZELASTINE (ASTELIN) 137 MCG (0.1 %) NASAL SPRAY    2 sprays (274 mcg total) by Nasal route 2 (two) times daily.    BENZONATATE (TESSALON) 200 MG CAPSULE    TAKE 1 CAPSULE BY MOUTH THREE TIMES DAILY AS NEEDED FOR COUGH    BLOOD-GLUCOSE SENSOR (DEXCOM G6 SENSOR) ANDRÉS    Use as directed for continuous glucose monitoring. Change sensor every 10 days.    BROMOCRIPTINE (CYCLOSET) 0.8 MG TAB    Take 3.2 mg by mouth.    CICLOPIROX (PENLAC) 8 % SOLN    Apply to nails once daily    DEXCOM G6 SENSOR ANDRÉS    SMARTSIG:Topical Every 10 Days    DEXCOM G6 TRANSMITTER ANDRÉS    Use as directed to check glucose. Change every 90 days.    EZETIMIBE (ZETIA) 10 MG TABLET    TAKE 1 TABLET BY MOUTH EVERY EVENING    INSULIN ASPART U-100 (NOVOLOG FLEXPEN U-100 INSULIN) 100 UNIT/ML (3 ML) INPN PEN    THREE TIMES DAILY with meals per SLIDING SCALE    LISINOPRIL (PRINIVIL,ZESTRIL) 2.5 MG TABLET    TAKE 1 TABLET BY MOUTH EVERY EVENING    MULTIVITAMIN CAPSULE    Take 1 capsule by mouth once daily.    OZEMPIC 0.25 MG OR 0.5 MG (2 MG/3 ML) PEN INJECTOR    Inject into the skin.    TIMOLOL MALEATE 0.5% (TIMOPTIC) 0.5 % DROP    Place 1 drop into both eyes 2 (two) times daily.     Review of patient's allergies indicates:   Allergen Reactions    Penicillins Anaphylaxis    Nuts [tree nut]      Pine Nuts     Review of Systems   Constitutional: Negative for malaise/fatigue.   HENT:  Negative for hearing loss.    Eyes:  Positive for visual disturbance. Negative for double vision.   Cardiovascular:  Negative for chest pain.   Respiratory:  Positive for shortness of breath, sleep disturbances due to breathing and wheezing.    Endocrine: Negative for cold intolerance.   Hematologic/Lymphatic: Does not bruise/bleed easily.   Skin:  Negative for poor wound healing and suspicious lesions.   Musculoskeletal:  Negative for gout, joint pain and joint swelling.   Gastrointestinal:  Negative for nausea and vomiting.   Genitourinary:   Negative for dysuria.   Neurological:  Positive for focal weakness, numbness, paresthesias and sensory change.   Psychiatric/Behavioral:  Positive for substance abuse. Negative for depression and memory loss. The patient is not nervous/anxious.    Allergic/Immunologic: Negative for persistent infections.       Objective:   Body mass index is 45.76 kg/m².  There were no vitals filed for this visit.             radiographs right thumb were normal  Assessment:     Encounter Diagnoses   Name Primary?    Right hand pain     Trigger finger of right thumb         Plan:     The patient was counseled regarding a right trigger thumb release.  Risk complications and alternatives were discussed including the risk of infection, anesthetic risk, injury to nerves and vessels, loss of motion, and possible need for additional surgeries were discussed.  She seems to understand and agree to that surgery.  All questions were answered.                Disclaimer: This note was prepared using a voice recognition system and is likely to have sound alike errors within the text.

## 2024-04-17 ENCOUNTER — HOSPITAL ENCOUNTER (OUTPATIENT)
Dept: RADIOLOGY | Facility: HOSPITAL | Age: 64
Discharge: HOME | End: 2024-04-17
Attending: FAMILY MEDICINE
Payer: COMMERCIAL

## 2024-04-17 ENCOUNTER — PATIENT MESSAGE (OUTPATIENT)
Dept: ORTHOPEDICS | Facility: CLINIC | Age: 64
End: 2024-04-17
Payer: COMMERCIAL

## 2024-04-17 DIAGNOSIS — Z12.31 ENCOUNTER FOR SCREENING MAMMOGRAM FOR BREAST CANCER: ICD-10-CM

## 2024-04-17 PROCEDURE — 77063 BREAST TOMOSYNTHESIS BI: CPT | Mod: 26,,, | Performed by: RADIOLOGY

## 2024-04-17 PROCEDURE — 77067 SCR MAMMO BI INCL CAD: CPT | Mod: TC,PO

## 2024-04-17 PROCEDURE — 77063 BREAST TOMOSYNTHESIS BI: CPT | Mod: TC,PO

## 2024-04-17 PROCEDURE — 77067 SCR MAMMO BI INCL CAD: CPT | Mod: 26,,, | Performed by: RADIOLOGY

## 2024-06-05 ENCOUNTER — TELEPHONE (OUTPATIENT)
Dept: UROLOGY | Facility: CLINIC | Age: 64
End: 2024-06-05
Payer: COMMERCIAL

## 2024-06-05 NOTE — TELEPHONE ENCOUNTER
Tried calling pt to r/s cysto, there was no answer       ----- Message from Eris Gabriel sent at 6/5/2024 12:19 PM CDT -----  Contact: Chelsie Holguin would like a call back at [phone] in regards to needing to reschedule the procedure she missed that was on 5/27/24.  Thanks   Am

## 2024-06-07 ENCOUNTER — TELEPHONE (OUTPATIENT)
Dept: UROLOGY | Facility: CLINIC | Age: 64
End: 2024-06-07
Payer: COMMERCIAL

## 2024-06-07 NOTE — TELEPHONE ENCOUNTER
Patient called wanting to reschedule Cystoscopy appointment missed on 05/27/2024. Appointment rescheduled to 06/13/2024 for 1:15PM at the [place] location. Patient verbalized understanding.

## 2024-06-13 ENCOUNTER — PROCEDURE VISIT (OUTPATIENT)
Dept: UROLOGY | Facility: CLINIC | Age: 64
End: 2024-06-13
Payer: COMMERCIAL

## 2024-06-13 VITALS — WEIGHT: 293 LBS | BODY MASS INDEX: 45.76 KG/M2

## 2024-06-13 DIAGNOSIS — C67.2 MALIGNANT NEOPLASM OF LATERAL WALL OF URINARY BLADDER: Primary | ICD-10-CM

## 2024-06-13 PROCEDURE — 87088 URINE BACTERIA CULTURE: CPT | Performed by: UROLOGY

## 2024-06-13 PROCEDURE — 87086 URINE CULTURE/COLONY COUNT: CPT | Performed by: UROLOGY

## 2024-06-13 PROCEDURE — 52000 CYSTOURETHROSCOPY: CPT | Mod: S$GLB,,, | Performed by: UROLOGY

## 2024-06-13 PROCEDURE — 88112 CYTOPATH CELL ENHANCE TECH: CPT | Performed by: PATHOLOGY

## 2024-06-13 RX ORDER — CIPROFLOXACIN 500 MG/1
500 TABLET ORAL
Status: COMPLETED | OUTPATIENT
Start: 2024-06-13 | End: 2024-06-13

## 2024-06-13 RX ADMIN — CIPROFLOXACIN 500 MG: 500 TABLET ORAL at 01:06

## 2024-06-13 NOTE — PROCEDURES
Procedures  Chief Complaint:   Encounter Diagnosis   Name Primary?    Malignant neoplasm of lateral wall of urinary bladder Yes       HPI:    6/13/24- here for surveillance cystoscopy.  Since last cysto she has had some issues with urgency with occasional stress and urge incontinence.  No significant dysuria though.    62-year-old female who comes in from [place] with a new diagnosis of possible bladder cancer.  She states this past summer she had difficulty voiding and a Tomas catheter was placed.  She did have a little bit hematuria afterwards, could have been related to the catheter placement.  She was doing well until on proximally [place] she began to have gross hematuria with no other significant symptoms.  This eventually cleared up on its own, CT scan demonstrated possible bladder masses and she was referred to an outside urologist.  Cystoscopy demonstrated significant tumor surrounding the right ureteral orifice, surgeries were attempted, but could not get scheduled due to other factors for preoperative clearance.  Patient is now cleared, but has decided to come to [facility] for treatment.  Patient is a current smoker, no other lower urinary tract symptoms.  No other urological or gynecological history, she has all of her female organs.  No evidence of true incontinence, no constipation.  She would an uncle with bladder cancer, no other family history of urological cancers or stones.    Allergies:  Penicillins    Medications:  has a current medication list which includes the following prescription(s): atorvastatin, azelastine, benzonatate, dexcom g6 sensor, cycloset, dexcom g6 sensor, dexcom g6 transmitter, ezetimibe, baqsimi, hyoscyamine, insulin, insulin aspart u-100, lisinopril, multivitamin, phenazopyridine, timolol maleate 0.5%, trulicity, and varenicline.    Review of Systems:  General: No fever, chills, fatigability, or weight loss.  Skin: No rashes, itching, or changes in color or texture of  skin.  Chest: Denies EASON, cyanosis, wheezing, cough, and sputum production.  Abdomen: Appetite fine. No weight loss. Denies diarrhea, abdominal pain, hematemesis, or blood in stool.  Musculoskeletal: No joint stiffness or swelling. Denies back pain.  : As above.  All other review of systems negative.    PMH:   has a past medical history of Asthma, Cigarette nicotine dependence without complication (2017), Colon polyp, DM (diabetes mellitus) type II uncontrolled with eye manifestation, Hyperplastic colon polyp (2015), Hypertension, Morbid obesity (10/08/2014), and Sleep apnea.    PSH:   has a past surgical history that includes  section; Eye surgery (Right); turbt (transurethral resection of bladder tumor) (N/A, 2023); and Cystoscopy with ureteroscopy, retrograde pyelography, and insertion of stent (Bilateral, 2023).    FamHx: family history includes Bladder Cancer in her paternal uncle; Coronary artery disease in her father; Diabetes in her father; Hypertension in her father; Lung cancer in her father; Lymphoma in her paternal uncle; Pancreatic cancer in her mother; Stroke in her father.    SocHx:  reports that she has been smoking cigarettes. She has a 27.75 pack-year smoking history. She has never used smokeless tobacco. She reports that she does not drink alcohol and does not use drugs.      Physical Exam:  There were no vitals filed for this visit.    General: A&Ox3, no apparent distress, no deformities  Neck: No masses, normal ROM  Lungs: normal inspiration, no use of accessory muscles  Heart: normal pulse, no arrhythmias  Abdomen: Soft, NT, ND, no masses, no hernias, no hepatosplenomegaly  Skin: The skin is warm and dry. No jaundice.  Ext: No c/c/e.  : - No pelvic floor prolapse.  Normal introitus, no urethral abnomralities. No Perineal abnormalities.    Labs/Studies:   Cytology negative   CT stone protocol right hydro, 4 mm right ureteral stone, 1.3 cm renal stone, 3  cm bladder mass 4/23  CT urogram right bladder wall tumor 1/23    Procedure: Diagnostic Cystoscopy    Procedure in Detail: After proper consents were obtained, the patient was prepped and draped in normal sterile fashion for diagnostic cystoscopy. The flexible cystoscope was then introduced into the urethra, and advanced into the bladder under direct vision. The urethral mucosa appeared normal, and no strictures were noted. The sphincter appeared to be normal.  The bladder neck was normal. Inspection of the interior of the bladder was then carried out. The trigone was unremarkable, with no mucosal lesions. The ureteral orifices were normal in position and configuration. Systematic inspection of the mucosa of the bladder was then carried out, rotating the cystoscope so that all areas of the left and right lateral walls, the dome of the bladder, and the posterior wall were all visualized. The cystoscope was then advanced further into the bladder, and maximum deflection of the scope was performed so that the bladder neck could be inspected. No mucosal lesions were noted there. The cystoscope was then removed, and the procedure terminated.     Findings: normal cystoscopy       Impression/Plan:     1. Bladder cancer-  vesicolithalopaxy negative bx  5/2/23,  pTa high TURBT, retros normal  1/31/23    Cystoscopy is negative and will follow up on the cytology, if clear then see me in 6 months with cystoscopy and cytology as per surveillance protocol.  Still with significant debris and she will try to hydrate to see if this clears, will also add cranberry juice and pumpkin seed oil to assist with urgency, does not want to pursue any other medical management.  Appears to be both urgency with some stress incontinence.  Call with any issues prior to the next appointment.  CT urogram will also be due at the next appointment.    2. Nephrolithiasis- clear and call with any issues, using water hydration and lemonade.

## 2024-06-15 LAB — BACTERIA UR CULT: ABNORMAL

## 2024-06-18 LAB
FINAL PATHOLOGIC DIAGNOSIS: NORMAL
Lab: NORMAL

## 2024-08-11 ENCOUNTER — PATIENT MESSAGE (OUTPATIENT)
Dept: ENDOCRINOLOGY | Facility: CLINIC | Age: 64
End: 2024-08-11
Payer: COMMERCIAL

## 2024-08-20 ENCOUNTER — TELEPHONE (OUTPATIENT)
Dept: FAMILY MEDICINE | Facility: CLINIC | Age: 64
End: 2024-08-20
Payer: COMMERCIAL

## 2024-08-20 NOTE — TELEPHONE ENCOUNTER
----- Message from Nikolai Myers sent at 8/20/2024 10:40 AM CDT -----  Contact: [phone]  Type:  Same Day Appointment Request    Caller is requesting a same day appointment.  Caller declined first available appointment listed below.    Name of Caller:IVANA SHAY [[id number]]  When is the first available appointment?09/09 .... Need to be seen today  Symptoms:physical before her pre op surgery  Best Call Back Number: [phone]  Additional Information: [id number]... YUKO RODRÍGUEZ   Hematology / Oncology Outpatient Consult Note    Ayesha Rae 48 y o  female [id number]         Date:  5/21/2018    Assessment / Plan:  A 27-year-old postmenopausal woman with newly diagnosed stage IA right breast cancer, grade 2, ER 90% positive, MA negative, HER2 equivocal disease  Interestingly enough, initial biopsy which showed micro invasion was ER negative, MA negative HER2 3+ disease  She underwent lumpectomy with sentinel lymph node biopsy, resulting in FILEMON  Her tumor size was 4 mm  We had extensive discussion regarding the diagnosis, discrepancy of tumor phenotype between biopsy and lumpectomy, staging information, good prognosis, and treatment options  Because of the discrepant tumor phenotype as well as borderline size of tumor, several treatment options should be considered as follows  1   Adjuvant hormonal therapy with anastrozole alone  2   Adjuvant hormonal therapy with trastuzumab for 1 year with anastrozole  3   Adjuvant chemotherapy with weekly paclitaxel and trastuzumab followed by trastuzumab monotherapy for a year as well as anastrozole  With her tumor size, there is no definitive data that trastuzumab of improved the chance of cure, even if she had truly HER 2 positive disease which is somewhat questionable  For the same reason, there is no definitive data, that adjuvant chemotherapy make reasonable difference  Side effects of trastuzumab as well as chemotherapy was thoroughly discussed  After the lengthy discussion, she chose to be on anastrozole monotherapy which is reasonable decision  Side effects of anastrozole was thoroughly discussed, including but not limited to hot flashes, musculoskeletal symptoms and bone mineral density loss  I recommended her to take calcium vitamin-D on a regular basis  She may start radiation therapy any time  I will see her again in 4 months for routine follow-up  She is in agreement    All the patient questions were answered to her satisfaction  Addendum; she appeared to have some cellulitis at the operative site, I prescribed Levaquin  She may contact Dr Claude Sanchez if she has no improvement or worsening of skin change  Subjective:     HPI:  A 69-year-old postmenopausal woman who had last menstrual cycle at age of 47 was found to have mammographic abnormality in her right breast   Therefore, she underwent right breast biopsy in March 1, 2018 which showed micro invasive carcinoma with extensive DCIS  Micro invasive carcinoma was ER negative, WA negative, her 2 3+ disease  Subsequently, she underwent lumpectomy with sentinel lymph node biopsy by Dr Claude Sanchez in April 24, 2018  She had 4 x 3 mm of invasive ductal carcinoma, grade 2  Lymphovascular invasion was not seen  One sentinel lymph node was negative for metastatic disease  This was ER 90% positive, WA negative, her 2 2+ disease  HER2 fish was equivocal with ratio of 1 4  She presents today to discuss adjuvant treatment options  She has no complaint of pain  However, since last week, she has noticed some erythematous skin change at the lumpectomy site  There is no fever, chills or night sweats  She denied any respiratory symptoms  She only has hypothyroidism as past medical history  She has no history of major surgery  She denied family history of breast or ovarian cancer  Her performance status is normal         Interval History:          Objective:     Primary Diagnosis:    1  Right breast cancer, stage IA  (pT1a, pN0, M0) grade 2, ER 90% positive, WA negative, HER2 fish equivocal  (initial biopsy report micro invasive cancer, ER negative, WA negative, her 2 3+ disease )  Diagnosed in April 2018      Cancer Staging:  Cancer Staging  Malignant neoplasm of upper-outer quadrant of right breast in female, estrogen receptor positive ([facility] [address] )  Staging form: Breast, AJCC 8th Edition  - Clinical: cT1mi, cN0, cM0 - Signed by Melinda Valentino MD on 3/14/2018  - Pathologic: Stage IA (pT1a, pN0(sn), cM0, G2, ER: Positive, PA: Negative, HER2: Equivocal) - Signed by Doris Kern MD on 5/8/2018        Previous Hematologic/ Oncologic Treatment:         Current Hematologic/ Oncologic Treatment:      Adjuvant hormonal therapy with anastrozole to be started in May 2018  Disease Status:     FILEMON status post lumpectomy with sentinel lymph node biopsy  Test Results:    Pathology:    Initial biopsy showed micro invasive cancer with extensive DCIS  Micro invasive part was ER negative, PA negative, her 2 3+ disease  Lumpectomy showed 4 x 3 mm of invasive ductal carcinoma, grade 2  No evidence of lymphovascular invasion  1 sentinel lymph node was negative for metastatic disease  This was ER 90 -95 % positive, PA negative, HER2  2+ disease  HER2 fish was equivocal with ratio of 1 4  Stage IA (pT1a, pN0, M0)    Radiology:    Chest x-ray was negative for pulmonary disease  Laboratory:    CBC and CMP are within normal limits  Physical Exam:      General Appearance:    Alert, oriented        Eyes:    PERRL   Ears:    Normal external ear canals, both ears   Nose:   Nares normal, septum midline   Throat:   Mucosa moist  Pharynx without injection  Neck:   Supple       Lungs:     Clear to auscultation bilaterally   Chest Wall:    No tenderness or deformity    Heart:    Regular rate and rhythm       Abdomen:     Soft, non-tender, bowel sounds +, no organomegaly           Extremities:   Extremities no cyanosis or edema       Skin:   no rash or icterus  Lymph nodes:   Cervical, supraclavicular, and axillary nodes normal   Neurologic:   CNII-XII intact, normal strength, sensation and reflexes     Throughout          Breast exam: Lumpectomy scar at lateral aspect of her right breast with minor erythematous skin change  No palpable abnormality  Left breast exam is negative  ROS: Review of Systems   All other systems reviewed and are negative            Imaging: Nm Lymphatic Breast    Result Date: 4/24/2018  Narrative: SENTINEL NODE LYMPHOSCINTIGRAPHY INDICATION: Right breast carcinoma FINDINGS: 0 520 mCi Tc-99m sulfur colloid (0 6 cc volume) was administered in divided doses by Dr Mickey Herndon in the right periareolar region  Scintigraphic images were obtained over the hemithorax and axilla in multiple projections  Node identified  Using scintigraphic guidance, the corresponding skin site was marked with an indelible marker  The patient was transferred to the operating room in satisfactory condition  Impression: [place] lymph node localized to right axilla  Workstation performed: [id number]     Mammo Needle Localization Right ([employer])    Result Date: 4/24/2018  Narrative: Mammo Needle Localization Right ([employer]): April 24, 2018 - Check In #: [de-identified] CC and LM view(s) were taken of the right breast  Technologists: DAVID Maldonado (DAVID)(M); DAVID Jones Cha (DAVID)(M) INDICATION: Biopsy-proven microinvasive carcinoma arising in extensive high-grade ductal carcinoma in situ in the right breast  FINDINGS: After obtaining informed consent for the procedure at which time the risks and benefits were explained to the patient including bleeding, infection, and pneumothorax, the right breast was placed in the alphanumeric grid  Utilizing sterile technique, 1% lidocaine was utilized for local anesthesia and a 5 cm BD needle was advanced adjacent to the biopsy clip in the anterior 3rd of the lateral breast  Again utilizing mammographic evidence and local anesthesia, a 2nd 7 mm BD needle was advanced in the posterior lateral upper breast adjacent to indeterminate microcalcifications which enhanced on recent MRI, likely multifocal DCIS  The hook wires were then deployed in routine fashion and the needles removed  The patient tolerated the procedure well with no immediate post procedural complications demonstrated   IMPRESSION: Bracket needle localization of the right lateral breast  Successful bracket needle localization of the right breast  Recommendation: Clinical management  Transcription Location: [address] [place] [address]: [id number]    Mammo Needle Localization Right [employer]) Each Add    Result Date: 4/24/2018  Narrative: Mammo Needle Localization Right ([employer]) Each Additional: April 24, 2018 - Check In #: [de-identified] Technologist: DAVID Montes (R)(M) INDICATION: Biopsy-proven microinvasive carcinoma arising in extensive high-grade ductal carcinoma in situ in the right breast  FINDINGS: After obtaining informed consent for the procedure at which time the risks and benefits were explained to the patient including bleeding, infection, and pneumothorax, the right breast was placed in the alphanumeric grid  Utilizing sterile technique, 1% lidocaine was utilized for local anesthesia and a 5 cm BD needle was advanced adjacent to the biopsy clip in the anterior 3rd of the lateral breast  Again utilizing mammographic evidence and local anesthesia, a 2nd 7 mm BD needle was advanced in the posterior lateral upper breast adjacent to indeterminate microcalcifications which enhanced on recent MRI, likely multifocal DCIS  The hook wires were then deployed in routine fashion and the needles removed  The patient tolerated the procedure well with no immediate post procedural complications demonstrated  IMPRESSION: Bracket needle localization of the right lateral breast  Successful bracket needle localization of the right breast  Recommendation: Clinical management  Transcription Location: [address] [place] [address]: [id number]    Mammo Breast Specimen (no Charge)    Result Date: 4/25/2018  Narrative: Mammo Breast Specimen No Charge: Right Breast - April 24, 2018 - Check In #: [de-identified] The image of the right breast specimen demonstrates the 2 localization wires and biopsy clip to be within the specimen as were indeterminate calcifications adjacent to the posterior wire   Localized biopsy clip and indeterminate calcifications within the right breast specimen  Transcription Location: [address] [place] [address] [place]: [id number]        Labs:   Lab Results   Component Value Date    WBC 7 34 04/12/2018    HGB 13 8 04/12/2018    HCT 41 7 04/12/2018    MCV 90 04/12/2018     04/12/2018     Lab Results   Component Value Date     04/12/2018    K 4 3 04/12/2018     04/12/2018    CO2 28 04/12/2018    ANIONGAP 8 04/12/2018    BUN 18 04/12/2018    CREATININE 0 76 04/12/2018    GLUCOSE 91 04/12/2018    CALCIUM 9 4 04/12/2018    AST 24 04/12/2018    ALT 38 04/12/2018    ALKPHOS 73 04/12/2018    PROT 7 7 04/12/2018    BILITOT 0 41 04/12/2018    EGFR 89 04/12/2018         Vital Sign:    Body surface area is 1 87 meters squared      Wt Readings from Last 3 Encounters:   05/21/18 78 5 kg (173 lb)   05/11/18 79 9 kg (176 lb 3 2 oz)   05/08/18 78 kg (172 lb)        Temp Readings from Last 3 Encounters:   05/21/18 98 5 °F (36 9 °C) (Tympanic)   05/11/18 98 2 °F (36 8 °C) (Temporal)   05/08/18 98 5 °F (36 9 °C)        BP Readings from Last 3 Encounters:   05/21/18 90/70   05/11/18 92/60   05/08/18 102/70         Pulse Readings from Last 3 Encounters:   05/21/18 77   05/11/18 67   05/08/18 60     @LASTSAO2(3)@    Active Problems:   Patient Active Problem List   Diagnosis    Malignant neoplasm of upper-outer quadrant of right breast in female, estrogen receptor positive ([facility] [address] )    Dense breast tissue       Past Medical History:   Past Medical History:   Diagnosis Date    Anxiety     Bilateral breast cysts     Breast tenderness     Depression     Frequent urination     Hashimoto's disease     Hypothyroidism     Night sweats     Palpitations     improved since lowered caffeine intake    Tinnitus     b/l    Viral syndrome        Surgical History:   Past Surgical History:   Procedure Laterality Date    BREAST BIOPSY Right 03 01/18    DCIS    BREAST BIOPSY Left 04/2018    benign    BREAST LUMPECTOMY Right 4/24/2018    Procedure: LUMPECTOMY BREAST NEEDLE LOCALIZED BRACKET;  Surgeon: Donna Lima MD;  [facility]: [facility];  Service: Surgical Oncology    COLONOSCOPY      ESOPHAGOGASTRODUODENOSCOPY      LYMPH NODE BIOPSY Right 4/24/2018    Procedure: BIOPSY LYMPH NODE SENTINEL;  Surgeon: Donna Lima MD;  [facility];  Service: Surgical Oncology    WISDOM TOOTH EXTRACTION         Family History:    Family History   Problem Relation Age of Onset    Prostate cancer Paternal Uncle 64    No Known Problems Mother     Dementia Father     Heart disease Father        Cancer-related family history includes Prostate cancer (age of onset: 64) in her paternal uncle  Social History:   Social History     Social History    Marital status: /Civil Union     Spouse name: N/A    Number of children: N/A    Years of education: N/A     Occupational History    Not on file  Social History Main Topics    Smoking status: Never Smoker    Smokeless tobacco: Never Used    Alcohol use 4 2 oz/week     7 Standard drinks or equivalent per week      Comment: 7 -8 drinks a week    Drug use: No    Sexual activity: Not on file     Other Topics Concern    Not on file     Social History Narrative    No narrative on file       Current Medications:   Current Outpatient Prescriptions   Medication Sig Dispense Refill    levothyroxine 88 mcg tablet Take 1 tablet by mouth daily      Multiple Vitamin (MULTIVITAMIN) tablet Take 1 tablet by mouth daily      traMADol (ULTRAM) 50 mg tablet Take 1 tablet (50 mg total) by mouth every 6 (six) hours as needed for moderate pain 20 tablet 0     No current facility-administered medications for this visit  Allergies:    Allergies   Allergen Reactions    Clindamycin Other (See Comments)     Was painful to walk     Erythromycin Hives    Sulfamethoxazole-Trimethoprim Hives    Amoxicillin Rash

## 2024-08-21 ENCOUNTER — OFFICE VISIT (OUTPATIENT)
Dept: FAMILY MEDICINE | Facility: CLINIC | Age: 64
End: 2024-08-21
Payer: COMMERCIAL

## 2024-08-21 VITALS
HEART RATE: 78 BPM | HEIGHT: 68 IN | SYSTOLIC BLOOD PRESSURE: 120 MMHG | OXYGEN SATURATION: 96 % | DIASTOLIC BLOOD PRESSURE: 70 MMHG | TEMPERATURE: 97 F | BODY MASS INDEX: 44.41 KG/M2 | WEIGHT: 293 LBS

## 2024-08-21 DIAGNOSIS — Z01.818 PRE-OP EXAMINATION: Primary | ICD-10-CM

## 2024-08-21 LAB
OHS QRS DURATION: 106 MS
OHS QTC CALCULATION: 429 MS

## 2024-08-21 PROCEDURE — 3008F BODY MASS INDEX DOCD: CPT | Mod: CPTII,S$GLB,, | Performed by: NURSE PRACTITIONER

## 2024-08-21 PROCEDURE — 1159F MED LIST DOCD IN RCRD: CPT | Mod: CPTII,S$GLB,, | Performed by: NURSE PRACTITIONER

## 2024-08-21 PROCEDURE — 3078F DIAST BP <80 MM HG: CPT | Mod: CPTII,S$GLB,, | Performed by: NURSE PRACTITIONER

## 2024-08-21 PROCEDURE — 99999 PR PBB SHADOW E&M-EST. PATIENT-LVL V: CPT | Mod: PBBFAC,,, | Performed by: NURSE PRACTITIONER

## 2024-08-21 PROCEDURE — 3046F HEMOGLOBIN A1C LEVEL >9.0%: CPT | Mod: CPTII,S$GLB,, | Performed by: NURSE PRACTITIONER

## 2024-08-21 PROCEDURE — 4010F ACE/ARB THERAPY RXD/TAKEN: CPT | Mod: CPTII,S$GLB,, | Performed by: NURSE PRACTITIONER

## 2024-08-21 PROCEDURE — 3074F SYST BP LT 130 MM HG: CPT | Mod: CPTII,S$GLB,, | Performed by: NURSE PRACTITIONER

## 2024-08-21 PROCEDURE — 99213 OFFICE O/P EST LOW 20 MIN: CPT | Mod: S$GLB,,, | Performed by: NURSE PRACTITIONER

## 2024-08-21 PROCEDURE — 1160F RVW MEDS BY RX/DR IN RCRD: CPT | Mod: CPTII,S$GLB,, | Performed by: NURSE PRACTITIONER

## 2024-08-21 PROCEDURE — 93010 ELECTROCARDIOGRAM REPORT: CPT | Mod: S$GLB,,, | Performed by: INTERNAL MEDICINE

## 2024-08-21 PROCEDURE — 93005 ELECTROCARDIOGRAM TRACING: CPT | Mod: S$GLB,,, | Performed by: NURSE PRACTITIONER

## 2024-08-21 NOTE — PROGRESS NOTES
Subjective     Patient ID: Chelsie Garrett is a 64 y.o. female.    Chief Complaint: Pre-op Exam  Pt in today for pre-op exam for eye surgery. Pt denies any previous anesthesia complications. HTN well controlled. Type 2 DM uncontrolled; managed by endocrinology. Pt has no other complaints today.    Past Medical History:   Diagnosis Date    Asthma     CHILDHOOD    Cigarette nicotine dependence without complication 09/19/2017    Colon polyp     DM (diabetes mellitus) type II uncontrolled with eye manifestation     Hyperplastic colon polyp 02/05/2015    Hypertension     Morbid obesity 10/08/2014    The patient presents with obesity.  Denies bulimia, amenorrhea, cold intolerance, edema, hip pain, hirsutism, knee pain, polydipsia, polyuria, thirst and weakness.  The patient does not perform regular exercise.  Previous treatments for obesity :self-directed dieting with success.  The patient and I discussed the importance of exercise.    She has continued to gain weight.   Wt Readings from Last     Sleep apnea     positive test     Social History     Socioeconomic History    Marital status:    Occupational History     Employer: [employer]   Tobacco Use    Smoking status: Every Day     Current packs/day: 0.75     Average packs/day: 0.8 packs/day for 37.0 years (27.8 ttl pk-yrs)     Types: Cigarettes    Smokeless tobacco: Never    Tobacco comments:     HOLD MIDNIGHT PRIOR TO SURGERY   Substance and Sexual Activity    Alcohol use: Never    Drug use: No     Social Determinants of Health     Financial Resource Strain: Low Risk  (3/4/2024)    Overall Financial Resource Strain (CARDIA)     Difficulty of Paying Living Expenses: Not very hard   Food Insecurity: Unknown (3/4/2024)    Hunger Vital Sign     Worried About Running Out of Food in the Last Year: Never true   Transportation Needs: No Transportation Needs (3/4/2024)    PRAPARE - Transportation     Lack of Transportation (Medical): No     Lack of  Transportation (Non-Medical): No   Stress: No Stress Concern Present (3/4/2024)    [place] [employer] - Occupational Stress Questionnaire     Feeling of Stress : Not at all   Housing Stability: Low Risk  (3/4/2024)    Housing Stability Vital Sign     Unable to Pay for Housing in the Last Year: No     Number of Places Lived in the Last Year: 1     Unstable Housing in the Last Year: No     Past Surgical History:   Procedure Laterality Date     SECTION      X2    CYSTOSCOPIC LITHOLAPAXY N/A 2023    Procedure: CYSTOLITHOLAPAXY;  Surgeon: Marlon Clark MD;  Location: [facility] OR;  Service: Urology;  Laterality: N/A;    CYSTOSCOPY WITH URETEROSCOPY, RETROGRADE PYELOGRAPHY, AND INSERTION OF STENT Bilateral 2023    Procedure: CYSTOSCOPY, WITH RETROGRADE PYELOGRAM AND URETERAL STENT INSERTION;  Surgeon: Marlon Clark MD;  Location: [facility] OR;  Service: Urology;  Laterality: Bilateral;  Stent to right only    EYE SURGERY Right     2014    TURBT (TRANSURETHRAL RESECTION OF BLADDER TUMOR) N/A 2023    Procedure: TURBT (TRANSURETHRAL RESECTION OF BLADDER TUMOR);  Surgeon: Marlon Clark MD;  Location: [facility] OR;  Service: Urology;  Laterality: N/A;       HPI  Review of Systems   Constitutional: Negative.    HENT: Negative.     Eyes: Negative.    Respiratory: Negative.     Cardiovascular: Negative.    Gastrointestinal: Negative.    Endocrine: Negative.    Genitourinary: Negative.    Musculoskeletal: Negative.    Integumentary:  Negative.   Allergic/Immunologic: Negative.    Neurological: Negative.    Psychiatric/Behavioral: Negative.            Objective     Physical Exam  Vitals and nursing note reviewed.   Constitutional:       Appearance: Normal appearance.   HENT:      Head: Normocephalic.      Right Ear: Tympanic membrane, ear canal and external ear normal.      Left Ear: Tympanic membrane, ear canal and external ear normal.      Nose: Nose normal.       Mouth/Throat:      Mouth: Mucous membranes are moist.      Pharynx: Oropharynx is clear.   Eyes:      Conjunctiva/sclera: Conjunctivae normal.      Pupils: Pupils are equal, round, and reactive to light.   Cardiovascular:      Rate and Rhythm: Normal rate and regular rhythm.      Pulses: Normal pulses.      Heart sounds: Normal heart sounds.   Pulmonary:      Effort: Pulmonary effort is normal.      Breath sounds: Normal breath sounds.   Abdominal:      General: Bowel sounds are normal.      Palpations: Abdomen is soft.   Musculoskeletal:         General: Normal range of motion.      Cervical back: Normal range of motion and neck supple.   Skin:     General: Skin is warm and dry.      Capillary Refill: Capillary refill takes 2 to 3 seconds.   Neurological:      Mental Status: She is alert and oriented to person, place, and time.   Psychiatric:         Mood and Affect: Mood normal.         Behavior: Behavior normal.         Thought Content: Thought content normal.         Judgment: Judgment normal.            Assessment and Plan     1. Pre-op examination  -     IN OFFICE EKG 12-LEAD (to Muse)                 No follow-ups on file.

## 2024-08-21 NOTE — PATIENT INSTRUCTIONS
Hydrate well  Rest  Report to ER immediately if symptoms worsen or persist      Regulo Holguin,     If you are due for any health screening(s) below please notify me so we can arrange them to be ordered and scheduled. Most healthy patients at your age complete them, but you are free to accept or refuse.     If you can't do it, I'll definitely understand. If you can, I'd certainly appreciate it!    Tests to Keep You Healthy    Mammogram: Met on 4/17/2024  Eye Exam: Met on 6/14/2024  Colon Cancer Screening: DUE  Cervical Cancer Screening: Met on 12/16/2019  Last HbA1c < 8 (04/09/2024): NO  Tobacco Cessation: NO      Its time for your colon cancer screening     Colorectal cancer is one of the leading causes of cancer death for men and women but it doesnt have to be. Screenings can prevent colorectal cancer or find it early enough to treat and cure the disease.     Our records indicate that you may be overdue for colon cancer screening. A colonoscopy or stool screening test can help identify patients at risk for developing colon cancer. Cancer screenings save lives, so schedule yours today to stay healthy.     A colonoscopy is the preferred test for detecting colon cancer. It is needed only once every 10 years if results are negative. While you are sedated, a flexible, lighted tube with a tiny camera is inserted into the rectum and advanced through the colon to look for cancers.     An alternative screening test that is used at home and returned to the lab may also be used. It detects hidden blood in bowel movements which could indicate cancer in the colon. If results are positive, you will need a colonoscopy to determine if the blood is a sign of cancer. This type of follow up (diagnostic) colonoscopy usually requires additional copays as required by your insurance provider.     If you recently had your colon cancer screening performed outside of [facility], please let your Health care team know so that they can  update your health record. Please contact your PCP if you have any questions.    Lets manage your A1c levels     Your last hemoglobin A1c was higher than the goal of less than 8. Hemoglobin A1c or HbA1c is a blood test that measures your average blood sugar levels over the past 3 months. It is the main test to help you and your health care team manage your diabetes.     Higher A1c levels are linked to diabetes complications, such as loss of vision, kidney disease, and nerve damage. Keeping your A1c at least less than 8 is important to stay healthy and we are here to help. Talk with your provider on how you can further improve your A1c.     We recommend that you set up blood work to get a repeat hemoglobin A1c in 3 months to monitor your diabetes. Let your health care team know if you have questions.    Were here to help you quit smoking     Our records indicated that you are still smoking. One of the best things you can do for your health is to stop smoking and we are here to help.     Talk with your provider about our Smoking Cessation Program and how we can support you on your journey.

## 2024-08-22 ENCOUNTER — TELEPHONE (OUTPATIENT)
Dept: FAMILY MEDICINE | Facility: CLINIC | Age: 64
End: 2024-08-22
Payer: COMMERCIAL

## 2024-08-22 DIAGNOSIS — R94.31 ABNORMAL EKG: Primary | ICD-10-CM

## 2024-08-22 NOTE — TELEPHONE ENCOUNTER
Spoke to pt over the phone, since her EKG was stable but abnormal . Does that mean she is cleared fr surgery I did not see any on the note from yesterday where states that she needs to see cardiology before getting cleared

## 2024-08-22 NOTE — TELEPHONE ENCOUNTER
----- Message from Jewel Jeffries sent at 8/22/2024  8:52 AM CDT -----  Contact: self   .Type:  Patient Returning Call    Who Called:Patient   Who Left Message for Patient:n/a   Does the patient know what this is regarding? Possible preop appt   Would the patient rather a call back or a response via [facility]? .[phone]  Best Call Back Number:.[phone]  Additional Information:

## 2024-08-26 ENCOUNTER — TELEPHONE (OUTPATIENT)
Dept: FAMILY MEDICINE | Facility: CLINIC | Age: 64
End: 2024-08-26
Payer: COMMERCIAL

## 2024-08-26 NOTE — TELEPHONE ENCOUNTER
----- Message from Hector Akins sent at 8/26/2024  9:48 AM CDT -----  Contact: Aziza  Aziza from [facility] is needing a call back in regards to getting the pt pre op clearance. Her surgery is tomorrow  and they are needing that faxed over as soon as possible. Fax [phone]. Please give a call back at [phone].

## 2024-08-26 NOTE — TELEPHONE ENCOUNTER
Aziza informed paperwork was faxed 8/22, she reports they have a shared fax and did not receive anything.  Asked that papers be faxed to [phone], Susi informed and paperwork faxed

## 2024-09-11 ENCOUNTER — PATIENT MESSAGE (OUTPATIENT)
Dept: FAMILY MEDICINE | Facility: CLINIC | Age: 64
End: 2024-09-11
Payer: COMMERCIAL

## 2024-09-24 NOTE — TELEPHONE ENCOUNTER
Pt has appointment scheduled with Dr Emmanuel   Pt presents with recent onset L ear pain, no discharge noted. Pt states hearing is down as well.    On exam, R MT in ear canal, TM normal. L TM red and bulging.    Assessment and Plan: Rx Omnicef x 10d, pt to have BMT in 3 days.

## 2024-09-25 ENCOUNTER — OFFICE VISIT (OUTPATIENT)
Dept: CARDIOLOGY | Facility: CLINIC | Age: 64
End: 2024-09-25
Payer: COMMERCIAL

## 2024-09-25 VITALS
WEIGHT: 293 LBS | HEIGHT: 68 IN | BODY MASS INDEX: 44.41 KG/M2 | OXYGEN SATURATION: 96 % | DIASTOLIC BLOOD PRESSURE: 82 MMHG | SYSTOLIC BLOOD PRESSURE: 122 MMHG | HEART RATE: 68 BPM

## 2024-09-25 DIAGNOSIS — E11.69 HYPERLIPIDEMIA ASSOCIATED WITH TYPE 2 DIABETES MELLITUS: ICD-10-CM

## 2024-09-25 DIAGNOSIS — E78.5 HYPERLIPIDEMIA ASSOCIATED WITH TYPE 2 DIABETES MELLITUS: ICD-10-CM

## 2024-09-25 DIAGNOSIS — R94.31 ABNORMAL EKG: ICD-10-CM

## 2024-09-25 DIAGNOSIS — F17.210 CIGARETTE NICOTINE DEPENDENCE WITHOUT COMPLICATION: Primary | ICD-10-CM

## 2024-09-25 DIAGNOSIS — Z72.0 TOBACCO ABUSE DISORDER: Chronic | ICD-10-CM

## 2024-09-25 DIAGNOSIS — E66.01 CLASS 3 SEVERE OBESITY DUE TO EXCESS CALORIES WITHOUT SERIOUS COMORBIDITY WITH BODY MASS INDEX (BMI) OF 45.0 TO 49.9 IN ADULT: ICD-10-CM

## 2024-09-25 PROCEDURE — 3074F SYST BP LT 130 MM HG: CPT | Mod: CPTII,S$GLB,, | Performed by: INTERNAL MEDICINE

## 2024-09-25 PROCEDURE — 3008F BODY MASS INDEX DOCD: CPT | Mod: CPTII,S$GLB,, | Performed by: INTERNAL MEDICINE

## 2024-09-25 PROCEDURE — 3079F DIAST BP 80-89 MM HG: CPT | Mod: CPTII,S$GLB,, | Performed by: INTERNAL MEDICINE

## 2024-09-25 PROCEDURE — 99999 PR PBB SHADOW E&M-EST. PATIENT-LVL IV: CPT | Mod: PBBFAC,,, | Performed by: INTERNAL MEDICINE

## 2024-09-25 PROCEDURE — 3046F HEMOGLOBIN A1C LEVEL >9.0%: CPT | Mod: CPTII,S$GLB,, | Performed by: INTERNAL MEDICINE

## 2024-09-25 PROCEDURE — 1159F MED LIST DOCD IN RCRD: CPT | Mod: CPTII,S$GLB,, | Performed by: INTERNAL MEDICINE

## 2024-09-25 PROCEDURE — 99214 OFFICE O/P EST MOD 30 MIN: CPT | Mod: S$GLB,,, | Performed by: INTERNAL MEDICINE

## 2024-09-25 PROCEDURE — 4010F ACE/ARB THERAPY RXD/TAKEN: CPT | Mod: CPTII,S$GLB,, | Performed by: INTERNAL MEDICINE

## 2024-09-25 NOTE — PROGRESS NOTES
Subjective   Patient ID:  Chelsie Garrett is a 64 y.o. female who presents for follow-up of Follow-up      HPI64 yo obese WF referred for abnormal EKG. Reviewed EKG and no significant findings. No evidence of MI and findings of possible MI related to lead placement. Patient has chronic SOB do to smoking and obesity. No CP.     Review of Systems   Constitutional: Negative for decreased appetite, fever, malaise/fatigue, weight gain and weight loss.   HENT:  Negative for hearing loss and nosebleeds.    Eyes:  Negative for visual disturbance.   Cardiovascular:  Negative for chest pain, claudication, cyanosis, dyspnea on exertion, irregular heartbeat, leg swelling, near-syncope, orthopnea, palpitations, paroxysmal nocturnal dyspnea and syncope.   Respiratory:  Positive for shortness of breath. Negative for cough, hemoptysis, sleep disturbances due to breathing, snoring and wheezing.    Endocrine: Negative for cold intolerance, heat intolerance, polydipsia and polyuria.   Hematologic/Lymphatic: Negative for adenopathy and bleeding problem. Does not bruise/bleed easily.   Skin:  Negative for color change, itching, poor wound healing, rash and suspicious lesions.   Musculoskeletal:  Negative for arthritis, back pain, falls, joint pain, joint swelling, muscle cramps, muscle weakness and myalgias.   Gastrointestinal:  Negative for bloating, abdominal pain, change in bowel habit, constipation, flatus, heartburn, hematemesis, hematochezia, hemorrhoids, jaundice, melena, nausea and vomiting.   Genitourinary:  Negative for bladder incontinence, decreased libido, frequency, hematuria, hesitancy and urgency.   Neurological:  Negative for brief paralysis, difficulty with concentration, excessive daytime sleepiness, dizziness, focal weakness, headaches, light-headedness, loss of balance, numbness, vertigo and weakness.   Psychiatric/Behavioral:  Negative for altered mental status, depression and memory loss. The patient does not have  "insomnia and is not nervous/anxious.    Allergic/Immunologic: Negative for environmental allergies, hives and persistent infections.          Objective     Physical Exam  Vitals and nursing note reviewed.   Constitutional:       Appearance: She is well-developed. She is obese.      Comments: /82   Pulse 68   Ht 5' 8" (1.727 m)   Wt (!) 142 kg (313 lb 1.6 oz)   SpO2 96%   BMI 47.61 kg/m²      HENT:      Head: Normocephalic and atraumatic.      Right Ear: External ear normal.      Left Ear: External ear normal.      Nose: Nose normal.   Eyes:      General: Lids are normal. No scleral icterus.        Right eye: No discharge.         Left eye: No discharge.      Conjunctiva/sclera: Conjunctivae normal.      Right eye: No hemorrhage.     Pupils: Pupils are equal, round, and reactive to light.   Neck:      Thyroid: No thyromegaly.      Vascular: No JVD.      Trachea: No tracheal deviation.   Cardiovascular:      Rate and Rhythm: Normal rate and regular rhythm.      Pulses: Intact distal pulses.      Heart sounds: Normal heart sounds. No murmur heard.     No friction rub. No gallop.   Pulmonary:      Effort: Pulmonary effort is normal. No respiratory distress.      Breath sounds: Normal breath sounds. No wheezing or rales.   Chest:      Chest wall: No tenderness.   Breasts:     Breasts are symmetrical.   Abdominal:      General: Bowel sounds are normal. There is no distension.      Palpations: Abdomen is soft. There is no hepatomegaly or mass.      Tenderness: There is no abdominal tenderness. There is no guarding or rebound.   Musculoskeletal:         General: No tenderness. Normal range of motion.      Cervical back: Normal range of motion and neck supple.   Lymphadenopathy:      Cervical: No cervical adenopathy.   Skin:     General: Skin is warm and dry.      Coloration: Skin is not pale.      Findings: No erythema or rash.   Neurological:      Mental Status: She is alert and oriented to person, place, and " time.      Cranial Nerves: No cranial nerve deficit.      Coordination: Coordination normal.      Deep Tendon Reflexes: Reflexes are normal and symmetric. Reflexes normal.   Psychiatric:         Behavior: Behavior normal.         Thought Content: Thought content normal.         Judgment: Judgment normal.            Assessment and Plan     1. Cigarette nicotine dependence without complication    2. Abnormal EKG    3. Class 3 severe obesity due to excess calories without serious comorbidity with body mass index (BMI) of 45.0 to 49.9 in adult    4. Hyperlipidemia associated with type 2 diabetes mellitus    5. Tobacco abuse disorder        Plan:  EKG findings require no further evaluation   Patient advised to modify risk factors such as weight, exercise, diet,  tobacco and alcohol exposure    No orders of the defined types were placed in this encounter.    Follow up if symptoms worsen or fail to improve.     Advance Care Planning     Date: 09/25/2024

## 2024-10-07 ENCOUNTER — PATIENT MESSAGE (OUTPATIENT)
Dept: ADMINISTRATIVE | Facility: HOSPITAL | Age: 64
End: 2024-10-07
Payer: COMMERCIAL

## 2024-10-16 ENCOUNTER — TELEPHONE (OUTPATIENT)
Dept: DIABETES | Facility: CLINIC | Age: 64
End: 2024-10-16
Payer: COMMERCIAL

## 2024-10-16 ENCOUNTER — LAB VISIT (OUTPATIENT)
Dept: LAB | Facility: HOSPITAL | Age: 64
End: 2024-10-16
Attending: FAMILY MEDICINE
Payer: COMMERCIAL

## 2024-10-16 ENCOUNTER — OFFICE VISIT (OUTPATIENT)
Dept: FAMILY MEDICINE | Facility: CLINIC | Age: 64
End: 2024-10-16
Payer: COMMERCIAL

## 2024-10-16 VITALS
OXYGEN SATURATION: 96 % | WEIGHT: 293 LBS | SYSTOLIC BLOOD PRESSURE: 162 MMHG | TEMPERATURE: 97 F | HEART RATE: 72 BPM | RESPIRATION RATE: 18 BRPM | BODY MASS INDEX: 47.65 KG/M2 | DIASTOLIC BLOOD PRESSURE: 80 MMHG

## 2024-10-16 DIAGNOSIS — E11.49 TYPE II DIABETES MELLITUS WITH NEUROLOGICAL MANIFESTATIONS: ICD-10-CM

## 2024-10-16 DIAGNOSIS — Z12.11 COLON CANCER SCREENING: ICD-10-CM

## 2024-10-16 DIAGNOSIS — E66.813 CLASS 3 SEVERE OBESITY DUE TO EXCESS CALORIES WITHOUT SERIOUS COMORBIDITY WITH BODY MASS INDEX (BMI) OF 45.0 TO 49.9 IN ADULT: ICD-10-CM

## 2024-10-16 DIAGNOSIS — E78.5 HYPERLIPIDEMIA ASSOCIATED WITH TYPE 2 DIABETES MELLITUS: ICD-10-CM

## 2024-10-16 DIAGNOSIS — E66.01 CLASS 3 SEVERE OBESITY DUE TO EXCESS CALORIES WITHOUT SERIOUS COMORBIDITY WITH BODY MASS INDEX (BMI) OF 45.0 TO 49.9 IN ADULT: ICD-10-CM

## 2024-10-16 DIAGNOSIS — Z00.00 ANNUAL PHYSICAL EXAM: Primary | ICD-10-CM

## 2024-10-16 DIAGNOSIS — E11.69 HYPERLIPIDEMIA ASSOCIATED WITH TYPE 2 DIABETES MELLITUS: ICD-10-CM

## 2024-10-16 DIAGNOSIS — L91.8 SKIN TAG: ICD-10-CM

## 2024-10-16 DIAGNOSIS — Z00.00 ANNUAL PHYSICAL EXAM: ICD-10-CM

## 2024-10-16 DIAGNOSIS — Z23 IMMUNIZATION DUE: ICD-10-CM

## 2024-10-16 LAB
ALBUMIN SERPL BCP-MCNC: 3.4 G/DL (ref 3.5–5.2)
ALP SERPL-CCNC: 94 U/L (ref 55–135)
ALT SERPL W/O P-5'-P-CCNC: 13 U/L (ref 10–44)
ANION GAP SERPL CALC-SCNC: 8 MMOL/L (ref 8–16)
AST SERPL-CCNC: 23 U/L (ref 10–40)
BASOPHILS # BLD AUTO: 0.06 K/UL (ref 0–0.2)
BASOPHILS NFR BLD: 0.8 % (ref 0–1.9)
BILIRUB SERPL-MCNC: 0.5 MG/DL (ref 0.1–1)
BNP SERPL-MCNC: 32 PG/ML (ref 0–99)
BUN SERPL-MCNC: 18 MG/DL (ref 8–23)
CALCIUM SERPL-MCNC: 9.5 MG/DL (ref 8.7–10.5)
CHLORIDE SERPL-SCNC: 100 MMOL/L (ref 95–110)
CHOLEST SERPL-MCNC: 124 MG/DL (ref 120–199)
CHOLEST/HDLC SERPL: 2.3 {RATIO} (ref 2–5)
CO2 SERPL-SCNC: 30 MMOL/L (ref 23–29)
CREAT SERPL-MCNC: 0.8 MG/DL (ref 0.5–1.4)
DIFFERENTIAL METHOD BLD: ABNORMAL
EOSINOPHIL # BLD AUTO: 0.1 K/UL (ref 0–0.5)
EOSINOPHIL NFR BLD: 1.4 % (ref 0–8)
ERYTHROCYTE [DISTWIDTH] IN BLOOD BY AUTOMATED COUNT: 14.5 % (ref 11.5–14.5)
EST. GFR  (NO RACE VARIABLE): >60 ML/MIN/1.73 M^2
ESTIMATED AVG GLUCOSE: 206 MG/DL (ref 68–131)
GLUCOSE SERPL-MCNC: 172 MG/DL (ref 70–110)
HBA1C MFR BLD: 8.8 % (ref 4–5.6)
HCT VFR BLD AUTO: 49.7 % (ref 37–48.5)
HDLC SERPL-MCNC: 53 MG/DL (ref 40–75)
HDLC SERPL: 42.7 % (ref 20–50)
HGB BLD-MCNC: 16.7 G/DL (ref 12–16)
IMM GRANULOCYTES # BLD AUTO: 0.02 K/UL (ref 0–0.04)
IMM GRANULOCYTES NFR BLD AUTO: 0.3 % (ref 0–0.5)
LDLC SERPL CALC-MCNC: 44 MG/DL (ref 63–159)
LYMPHOCYTES # BLD AUTO: 2.1 K/UL (ref 1–4.8)
LYMPHOCYTES NFR BLD: 28.2 % (ref 18–48)
MCH RBC QN AUTO: 32.7 PG (ref 27–31)
MCHC RBC AUTO-ENTMCNC: 33.6 G/DL (ref 32–36)
MCV RBC AUTO: 97 FL (ref 82–98)
MONOCYTES # BLD AUTO: 0.6 K/UL (ref 0.3–1)
MONOCYTES NFR BLD: 8.4 % (ref 4–15)
NEUTROPHILS # BLD AUTO: 4.4 K/UL (ref 1.8–7.7)
NEUTROPHILS NFR BLD: 60.9 % (ref 38–73)
NONHDLC SERPL-MCNC: 71 MG/DL
NRBC BLD-RTO: 0 /100 WBC
PLATELET # BLD AUTO: 193 K/UL (ref 150–450)
PMV BLD AUTO: 10.3 FL (ref 9.2–12.9)
POTASSIUM SERPL-SCNC: 4.8 MMOL/L (ref 3.5–5.1)
PROT SERPL-MCNC: 7.3 G/DL (ref 6–8.4)
RBC # BLD AUTO: 5.11 M/UL (ref 4–5.4)
SODIUM SERPL-SCNC: 138 MMOL/L (ref 136–145)
TRIGL SERPL-MCNC: 135 MG/DL (ref 30–150)
WBC # BLD AUTO: 7.27 K/UL (ref 3.9–12.7)

## 2024-10-16 PROCEDURE — 3079F DIAST BP 80-89 MM HG: CPT | Mod: CPTII,S$GLB,, | Performed by: NURSE PRACTITIONER

## 2024-10-16 PROCEDURE — 85025 COMPLETE CBC W/AUTO DIFF WBC: CPT | Performed by: NURSE PRACTITIONER

## 2024-10-16 PROCEDURE — 3066F NEPHROPATHY DOC TX: CPT | Mod: CPTII,S$GLB,, | Performed by: NURSE PRACTITIONER

## 2024-10-16 PROCEDURE — 99999 PR PBB SHADOW E&M-EST. PATIENT-LVL V: CPT | Mod: PBBFAC,,, | Performed by: NURSE PRACTITIONER

## 2024-10-16 PROCEDURE — 83036 HEMOGLOBIN GLYCOSYLATED A1C: CPT | Performed by: FAMILY MEDICINE

## 2024-10-16 PROCEDURE — 3060F POS MICROALBUMINURIA REV: CPT | Mod: CPTII,S$GLB,, | Performed by: NURSE PRACTITIONER

## 2024-10-16 PROCEDURE — 3052F HG A1C>EQUAL 8.0%<EQUAL 9.0%: CPT | Mod: CPTII,S$GLB,, | Performed by: NURSE PRACTITIONER

## 2024-10-16 PROCEDURE — 90471 IMMUNIZATION ADMIN: CPT | Mod: S$GLB,,, | Performed by: NURSE PRACTITIONER

## 2024-10-16 PROCEDURE — 4010F ACE/ARB THERAPY RXD/TAKEN: CPT | Mod: CPTII,S$GLB,, | Performed by: NURSE PRACTITIONER

## 2024-10-16 PROCEDURE — 3008F BODY MASS INDEX DOCD: CPT | Mod: CPTII,S$GLB,, | Performed by: NURSE PRACTITIONER

## 2024-10-16 PROCEDURE — 3077F SYST BP >= 140 MM HG: CPT | Mod: CPTII,S$GLB,, | Performed by: NURSE PRACTITIONER

## 2024-10-16 PROCEDURE — 80061 LIPID PANEL: CPT | Performed by: NURSE PRACTITIONER

## 2024-10-16 PROCEDURE — 99214 OFFICE O/P EST MOD 30 MIN: CPT | Mod: 25,S$GLB,, | Performed by: NURSE PRACTITIONER

## 2024-10-16 PROCEDURE — 90656 IIV3 VACC NO PRSV 0.5 ML IM: CPT | Mod: S$GLB,,, | Performed by: NURSE PRACTITIONER

## 2024-10-16 PROCEDURE — 80053 COMPREHEN METABOLIC PANEL: CPT | Performed by: NURSE PRACTITIONER

## 2024-10-16 PROCEDURE — 83880 ASSAY OF NATRIURETIC PEPTIDE: CPT | Performed by: NURSE PRACTITIONER

## 2024-10-16 PROCEDURE — 36415 COLL VENOUS BLD VENIPUNCTURE: CPT | Mod: PO | Performed by: FAMILY MEDICINE

## 2024-10-16 PROCEDURE — 1159F MED LIST DOCD IN RCRD: CPT | Mod: CPTII,S$GLB,, | Performed by: NURSE PRACTITIONER

## 2024-10-16 RX ORDER — PREDNISOLONE ACETATE 10 MG/ML
1 SUSPENSION/ DROPS OPHTHALMIC 4 TIMES DAILY
COMMUNITY
Start: 2024-08-20

## 2024-10-16 RX ORDER — INSULIN GLARGINE 100 [IU]/ML
INJECTION, SOLUTION SUBCUTANEOUS
COMMUNITY

## 2024-10-16 RX ORDER — OFLOXACIN 3 MG/ML
1 SOLUTION/ DROPS OPHTHALMIC 4 TIMES DAILY
COMMUNITY
Start: 2024-08-20

## 2024-10-16 NOTE — TELEPHONE ENCOUNTER
10/16 Called pt to schedule [facility] with [facility], pt requested a callback. Will call pt back today.

## 2024-10-16 NOTE — TELEPHONE ENCOUNTER
"Called pt back to schedule pt for "New Patient" visit , scheduled pt with [facility] for 10/22 @2pm as requested by pt.   "

## 2024-10-16 NOTE — PROGRESS NOTES
"Subjective:       Patient ID: Chelsie Garrett is a 64 y.o. female.    Chief Complaint: Nicotine Dependence (Pt states she needs something to help her stop smoking. Pt states she has tried chantix it did not help fully but she is now to the point smoking Is effecting her lungs)    HPI      RESPIRATORY:  She reports experiencing shortness of breath, which worsens when bending over and standing up. This symptom impacts her daily activities, making it difficult to complete tasks and requiring frequent rest. The shortness of breath is particularly noticeable during activities such as gardening. She acknowledges that her weight may be contributing to her breathing difficulties.    MUSCULOSKELETAL:  She reports lower back pain significantly impacting her daily activities. The pain affects her ability to stand for prolonged periods, necessitating frequent breaks to sit down. This limitation has altered her approach to household chores, particularly laundry. She now folds clothes while sitting on the bed instead of standing at a table. She also notes difficulty with activities that require getting up and down from the floor or ground, describing these movements as strenuous.    WEIGHT MANAGEMENT:  She reports a current weight of 313 lbs, an increase from her previous weight of 292 lbs. She expresses significant concern about her weight, stating she is "way too fat" and desires to lose weight. She acknowledges that increased physical activity could contribute to weight loss. She mentions attempting to use Ozempic for weight management but was unable to tolerate it. She also notes that she denies weight loss with Trulicity.    MEDICATIONS:  She reports difficulty adhering to her medication regimen, particularly with atorvastatin and blood pressure medication. She experiences challenges taking atorvastatin at night due to fatigue and occasionally forgets to take it before bed. She cannot take medications in the morning as she " feels nauseous if she eats too early. Consequently, she admits to not taking her blood pressure medication as prescribed. To address these issues, she has recently implemented a new pill organizer system, which she started using the morning of the appointment.    CARDIOVASCULAR:  She reports experiencing fluid retention, particularly noticeable swelling in her feet and ankles. She describes her ankles as typically thin, but now observes that her shoes are not fitting as they normally do. She expresses concern about the possibility of congestive heart failure in relation to these symptoms.    DIABETES:  She reports previous use of Dexcom continuous glucose monitor, but experienced issues with the transmitter not working and accidentally being discarded. She expresses interest in transitioning to an insulin pump to better manage her diabetes and potentially discontinue Lantus. She has previously tried Ozempic but was unable to tolerate it due to side effects. She also reports having tried Trulicity but did not notice any significant effects. She is seeking referral to an endocrinology nurse practitioner for diabetes management.    DIET:  She reports attempts to reduce carbohydrate intake, including eliminating p  otatoes and pasta from her home despite her [place] heritage. She expresses a preference for homemade meals, mentioning that she prepares grilled chicken salads for both dinner and breakfast. She acknowledges a fondness for carbohydrates, particularly pasta, but is making conscious efforts to limit their consumption in her diet.    MEDICAL HISTORY:  She has a history of bladder cancer. She also reports eye issues that affect her medication choices, specifically noting that aspirin causes her eyes to bleed. She expresses difficulty finding appropriate pain management options due to these medical constraints. She denies being able to take aspirin and reports limitations on Tylenol use. She inquires about the  safety and appropriateness of ibuprofen as an alternative pain management option.    PREVENTIVE CARE:  She is due for a colonoscopy and acknowledges it has been a long time since her last one. She is considering getting a flu vaccine but expresses hesitation due to a previous experience of receiving the wrong vaccine. After some deliberation, she agrees to receive the flu vaccine during this visit.    SMOKING:  She reports previous attempts to quit smoking using Chantix, successfully stopping for about seven months. She experienced side effects when trying Chantix again, including feeling nervous, antsy, and irritable. She attempted to quit smoking without Chantix for one day but found it extremely challenging. She expresses a strong desire to quit smoking and has tried using sugar-free candies as a substitute, but is concerned about excessive consumption. She voices significant concerns about potential weight gain associated with smoking cessation, noting she gained weight during her last attempt to quit.         Review of Systems   Constitutional:  Positive for fatigue and unexpected weight change. Negative for activity change and fever.   HENT:  Negative for ear pain, hearing loss, rhinorrhea, sore throat and trouble swallowing.    Eyes:  Negative for pain, discharge and visual disturbance.   Respiratory:  Positive for shortness of breath. Negative for cough, chest tightness and wheezing.    Cardiovascular:  Negative for chest pain and palpitations.   Gastrointestinal:  Negative for abdominal pain, blood in stool, constipation, diarrhea and vomiting.   Endocrine: Negative for polydipsia and polyuria.   Genitourinary:  Negative for difficulty urinating, dysuria, hematuria and menstrual problem.   Musculoskeletal:  Negative for arthralgias, joint swelling, myalgias and neck pain.   Skin:  Negative for color change and rash.   Neurological:  Negative for dizziness, weakness and headaches.   Psychiatric/Behavioral:   Negative for confusion, dysphoric mood and sleep disturbance. The patient is not nervous/anxious.        Vitals:    10/16/24 1042   BP: (!) 162/80   Pulse: 72   Resp: 18   Temp: 97.4 °F (36.3 °C)       Objective:     Current Outpatient Medications   Medication Sig Dispense Refill    atorvastatin (LIPITOR) 80 MG tablet TAKE 1 TABLET BY MOUTH EVERY EVENING 90 tablet 2    azelastine (ASTELIN) 137 mcg (0.1 %) nasal spray 2 sprays (274 mcg total) by Nasal route 2 (two) times daily. 30 mL 11    benzonatate (TESSALON) 200 MG capsule TAKE 1 CAPSULE BY MOUTH THREE TIMES DAILY AS NEEDED FOR COUGH 30 capsule 3    ciclopirox (PENLAC) 8 % Soln Apply to nails once daily 6.6 mL 6    DEXCOM G6 TRANSMITTER Hannah Use as directed to check glucose. Change every 90 days.      ezetimibe (ZETIA) 10 mg tablet TAKE 1 TABLET BY MOUTH EVERY EVENING 90 tablet 2    insulin aspart U-100 (NOVOLOG FLEXPEN U-100 INSULIN) 100 unit/mL (3 mL) InPn pen THREE TIMES DAILY with meals per SLIDING SCALE 45 mL 11    LANTUS SOLOSTAR U-100 INSULIN 100 unit/mL (3 mL) InPn pen SMARTSI Unit(s) SUB-Q Daily      lisinopriL (PRINIVIL,ZESTRIL) 2.5 MG tablet TAKE 1 TABLET BY MOUTH EVERY EVENING 90 tablet 2    multivitamin capsule Take 1 capsule by mouth once daily.      ofloxacin (OCUFLOX) 0.3 % ophthalmic solution Place 1 drop into the right eye 4 (four) times daily.      prednisoLONE acetate (PRED FORTE) 1 % DrpS Place 1 drop into the right eye 4 (four) times daily.      timolol maleate 0.5% (TIMOPTIC) 0.5 % Drop Place 1 drop into both eyes 2 (two) times daily.      tirzepatide (MOUNJARO) 2.5 mg/0.5 mL PnIj Inject 2.5 mg into the skin every 7 days. 1 mL 5     No current facility-administered medications for this visit.       Physical Exam  Vitals and nursing note reviewed.   Constitutional:       General: She is not in acute distress.     Appearance: She is well-developed. She is obese.   HENT:      Head: Normocephalic and atraumatic.      Mouth/Throat:       Mouth: Mucous membranes are moist.   Eyes:      Extraocular Movements: Extraocular movements intact.      Pupils: Pupils are equal, round, and reactive to light.   Neck:      Vascular: No carotid bruit.   Cardiovascular:      Rate and Rhythm: Normal rate and regular rhythm.   Pulmonary:      Effort: Pulmonary effort is normal.      Breath sounds: Normal breath sounds.   Musculoskeletal:         General: Normal range of motion.      Cervical back: Normal range of motion and neck supple.   Skin:     General: Skin is warm and dry.      Findings: No rash.   Neurological:      Mental Status: She is alert and oriented to person, place, and time.   Psychiatric:         Judgment: Judgment normal.         Assessment:       1. Annual physical exam    2. Type II diabetes mellitus with neurological manifestations    3. Hyperlipidemia associated with type 2 diabetes mellitus    4. Class 3 severe obesity due to excess calories without serious comorbidity with body mass index (BMI) of 45.0 to 49.9 in adult    5. Immunization due    6. Skin tag    7. Colon cancer screening        Plan:   Annual physical exam  -     Comprehensive Metabolic Panel; Future; Expected date: 10/16/2024  -     Cancel: Hemoglobin A1C; Future; Expected date: 10/16/2024  -     Lipid Panel; Future; Expected date: 10/16/2024  -     CBC Auto Differential; Future; Expected date: 10/16/2024  -     BNP; Future; Expected date: 10/16/2024  -     Microalbumin/Creatinine Ratio, Urine; Future; Expected date: 10/16/2024    Type II diabetes mellitus with neurological manifestations  -     Comprehensive Metabolic Panel; Future; Expected date: 10/16/2024  -     Cancel: Hemoglobin A1C; Future; Expected date: 10/16/2024  -     Lipid Panel; Future; Expected date: 10/16/2024  -     CBC Auto Differential; Future; Expected date: 10/16/2024  -     BNP; Future; Expected date: 10/16/2024  -     Microalbumin/Creatinine Ratio, Urine; Future; Expected date: 10/16/2024  -     Ambulatory  referral/consult to Diabetic Advanced Practice Providers (Medical Management); Future; Expected date: 10/23/2024  -     tirzepatide (MOUNJARO) 2.5 mg/0.5 mL PnIj; Inject 2.5 mg into the skin every 7 days.  Dispense: 1 mL; Refill: 5    Hyperlipidemia associated with type 2 diabetes mellitus  -     Comprehensive Metabolic Panel; Future; Expected date: 10/16/2024  -     Cancel: Hemoglobin A1C; Future; Expected date: 10/16/2024  -     Lipid Panel; Future; Expected date: 10/16/2024  -     CBC Auto Differential; Future; Expected date: 10/16/2024  -     BNP; Future; Expected date: 10/16/2024  -     Microalbumin/Creatinine Ratio, Urine; Future; Expected date: 10/16/2024    Class 3 severe obesity due to excess calories without serious comorbidity with body mass index (BMI) of 45.0 to 49.9 in adult  -     Comprehensive Metabolic Panel; Future; Expected date: 10/16/2024  -     Cancel: Hemoglobin A1C; Future; Expected date: 10/16/2024  -     Lipid Panel; Future; Expected date: 10/16/2024  -     CBC Auto Differential; Future; Expected date: 10/16/2024  -     BNP; Future; Expected date: 10/16/2024  -     Microalbumin/Creatinine Ratio, Urine; Future; Expected date: 10/16/2024  -     Ambulatory referral/consult to Diabetic Advanced Practice Providers (Medical Management); Future; Expected date: 10/23/2024    Immunization due  -     influenza (Flulaval, Fluzone, Fluarix) 45 mcg/0.5 mL IM vaccine (> or = 6 mo) 0.5 mL    Skin tag  -     Ambulatory referral/consult to Dermatology; Future; Expected date: 10/23/2024    Colon cancer screening  -     Ambulatory referral/consult to Endo Procedure ; Future; Expected date: 10/17/2024      This note was generated with the assistance of ambient listening technology. Verbal consent was obtained by the patient and accompanying visitor(s) for the recording of patient appointment to facilitate this note. I attest to having reviewed and edited the generated note for accuracy, though some  syntax or spelling errors may persist. Please contact the author of this note for any clarification.  No follow-ups on file.    There are no Patient Instructions on file for this visit.

## 2024-10-17 ENCOUNTER — PATIENT MESSAGE (OUTPATIENT)
Dept: PREADMISSION TESTING | Facility: HOSPITAL | Age: 64
End: 2024-10-17
Payer: COMMERCIAL

## 2024-10-17 NOTE — PROGRESS NOTES
The A1c is still high.  Are you still seeing the endocrinologist and having him manage your diabetes?

## 2024-10-18 ENCOUNTER — PATIENT MESSAGE (OUTPATIENT)
Dept: FAMILY MEDICINE | Facility: CLINIC | Age: 64
End: 2024-10-18
Payer: COMMERCIAL

## 2024-10-21 ENCOUNTER — PATIENT MESSAGE (OUTPATIENT)
Dept: LAB | Facility: HOSPITAL | Age: 64
End: 2024-10-21
Payer: COMMERCIAL

## 2024-10-21 DIAGNOSIS — R80.9 PROTEINURIA DUE TO TYPE 2 DIABETES MELLITUS: Primary | ICD-10-CM

## 2024-10-21 DIAGNOSIS — E11.29 PROTEINURIA DUE TO TYPE 2 DIABETES MELLITUS: Primary | ICD-10-CM

## 2024-10-21 RX ORDER — TIRZEPATIDE 2.5 MG/.5ML
2.5 INJECTION, SOLUTION SUBCUTANEOUS
Qty: 1 ML | Refills: 5 | Status: SHIPPED | OUTPATIENT
Start: 2024-10-21

## 2024-10-22 ENCOUNTER — OFFICE VISIT (OUTPATIENT)
Dept: DIABETES | Facility: CLINIC | Age: 64
End: 2024-10-22
Payer: COMMERCIAL

## 2024-10-22 ENCOUNTER — PATIENT MESSAGE (OUTPATIENT)
Dept: FAMILY MEDICINE | Facility: CLINIC | Age: 64
End: 2024-10-22
Payer: COMMERCIAL

## 2024-10-22 ENCOUNTER — TELEPHONE (OUTPATIENT)
Dept: DIABETES | Facility: CLINIC | Age: 64
End: 2024-10-22
Payer: COMMERCIAL

## 2024-10-22 DIAGNOSIS — E11.49 TYPE II DIABETES MELLITUS WITH NEUROLOGICAL MANIFESTATIONS: ICD-10-CM

## 2024-10-22 DIAGNOSIS — E11.69 HYPERLIPIDEMIA ASSOCIATED WITH TYPE 2 DIABETES MELLITUS: ICD-10-CM

## 2024-10-22 DIAGNOSIS — E11.3313 MODERATE NONPROLIFERATIVE DIABETIC RETINOPATHY OF BOTH EYES WITH MACULAR EDEMA ASSOCIATED WITH TYPE 2 DIABETES MELLITUS: Primary | ICD-10-CM

## 2024-10-22 DIAGNOSIS — E66.813 CLASS 3 SEVERE OBESITY DUE TO EXCESS CALORIES WITHOUT SERIOUS COMORBIDITY WITH BODY MASS INDEX (BMI) OF 45.0 TO 49.9 IN ADULT: ICD-10-CM

## 2024-10-22 DIAGNOSIS — E78.5 HYPERLIPIDEMIA ASSOCIATED WITH TYPE 2 DIABETES MELLITUS: ICD-10-CM

## 2024-10-22 DIAGNOSIS — E66.01 CLASS 3 SEVERE OBESITY DUE TO EXCESS CALORIES WITHOUT SERIOUS COMORBIDITY WITH BODY MASS INDEX (BMI) OF 45.0 TO 49.9 IN ADULT: ICD-10-CM

## 2024-10-22 PROCEDURE — 4010F ACE/ARB THERAPY RXD/TAKEN: CPT | Mod: CPTII,95,, | Performed by: NURSE PRACTITIONER

## 2024-10-22 PROCEDURE — 3052F HG A1C>EQUAL 8.0%<EQUAL 9.0%: CPT | Mod: CPTII,95,, | Performed by: NURSE PRACTITIONER

## 2024-10-22 PROCEDURE — 99204 OFFICE O/P NEW MOD 45 MIN: CPT | Mod: 95,,, | Performed by: NURSE PRACTITIONER

## 2024-10-22 PROCEDURE — 3066F NEPHROPATHY DOC TX: CPT | Mod: CPTII,95,, | Performed by: NURSE PRACTITIONER

## 2024-10-22 PROCEDURE — G2211 COMPLEX E/M VISIT ADD ON: HCPCS | Mod: 95,,, | Performed by: NURSE PRACTITIONER

## 2024-10-22 PROCEDURE — 3060F POS MICROALBUMINURIA REV: CPT | Mod: CPTII,95,, | Performed by: NURSE PRACTITIONER

## 2024-10-22 NOTE — Clinical Note
Dexcom, 4 wks in [place] Schedule DM Education - pump eval, 780g, in [place]. ASAP, patient would like to start pump before end of year due to already meeting deductible.

## 2024-10-22 NOTE — PROGRESS NOTES
Telemedicine Visit    The patient location is home  The chief complaint leading to consultation is: Diabetes    Visit type: audiovisual    Face to Face time with patient: 35  60 minutes of total time spent on the encounter, which includes face to face time and non-face to face time preparing to see the patient (eg, review of tests), Obtaining and/or reviewing separately obtained history, Documenting clinical information in the electronic or other health record, Independently interpreting results (not separately reported) and communicating results to the patient/family/caregiver, or Care coordination (not separately reported).  Each patient to whom he or she provides medical services by telemedicine is:  (1) informed of the relationship between the physician and patient and the respective role of any other health care provider with respect to management of the patient; and (2) notified that he or she may decline to receive medical services by telemedicine and may withdraw from such care at any time.  Notes:         Chelsie Garrett is a 64 y.o. female who  has a past medical history of Asthma, Cigarette nicotine dependence without complication (09/19/2017), Colon polyp, DM (diabetes mellitus) type II uncontrolled with eye manifestation, Hyperplastic colon polyp (02/05/2015), Hypertension, Morbid obesity (10/08/2014), and Sleep apnea., who present for an initial evaluation of Type 2 diabetes mellitus.     CHIEF COMPLAINT: Diabetes Consultation    PCP: Yasir Emmanuel MD     The patient was initially diagnosed with diabetes 28 years ago after second pregnancy.   Previously followed by Dr. Mason at [facility], last visit 12/2023.     Previous failed treatments include:  Ozempic  Trulicity  Metformin    Social Documentation:  Patient lives in [place].   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   Exercise: none. Limited due to back pain.  Sleep - nocturia x 2  Diet: no sugary drinks.     Current monitoring  regimen:  Dexcom G6 CGM      Current Diabetes related symptoms/problems include hyperglycemia, hypoglycemia , polyuria, and visual disturbances and have been unchanged.     Diabetes related complications:   retinopathy.   denies Pancreatitis  denies Gastroparesis  denies DKA  denies Hx/family Hx of MEN2/MTC  denies Frequent UTIs/yeast infections    Cardiovascular Risk Factors: dyslipidemia, hypertension, obesity (BMI >30 kg/m2), sedentary lifestyle, and smoking/tobacco exposure.     Any episodes of hypoglycemia?  Yes. Hypoglycemia treatment reviewed with patient and education to be provided in AVS.   Hypoglycemia Unawareness? No  Severe hypoglycemia requiring 3rd party? No    Diabetes Medications               insulin aspart U-100 (NOVOLOG FLEXPEN U-100 INSULIN) 100 unit/mL (3 mL) InPn pen THREE TIMES DAILY with meals per SLIDING SCALE - TAKING 6 UNITS, PLUS SLIDING SCALE. TAKING MORE BASED ON MEAL/TYPE OF CARB.     LANTUS SOLOSTAR U-100 INSULIN 100 unit/mL (3 mL) InPn pen SMARTSI Unit(s) SUB-Q Daily - TAKING 80 UNITS EVERY MORNING    tirzepatide (MOUNJARO) 2.5 mg/0.5 mL PnIj Inject 2.5 mg into the skin every 7 days. - HAS NOT STARTED, SENT IN YESTERDAY BY PCP.      DIABETES DISEASE MANAGEMENT STATUS  Statin: Taking  ACE/ARB: Taking  Screening or Prevention Patient's value Goal Complete/Controlled?   HgA1C Testing and Control   Lab Results   Component Value Date    HGBA1C 8.8 (H) 10/16/2024      Annually/Less than 8% No   Lipid profile : 10/16/2024 Annually Yes   LDL control Lab Results   Component Value Date    LDLCALC 44.0 (L) 10/16/2024    Annually/Less than 100 mg/dl  Yes   Nephropathy screening Lab Results   Component Value Date    LABMICR 111.0 10/16/2024     Lab Results   Component Value Date    PROTEINUA Negative 06/15/2021     Lab Results   Component Value Date    UTPCR 0.17 2022      Annually Yes   Blood pressure BP Readings from Last 1 Encounters:   10/16/24 (!) 162/80    Less than 140/90 No  "  Dilated retinal exam : 08/12/2024 Annually Yes   Foot exam   Most Recent Foot Exam Date: Not Found Annually No   Patient's medications, allergies, past medical, surgical, social and family histories were reviewed and updated as appropriate.     Review of Systems   Constitutional:  Negative for weight loss.   Eyes:  Negative for blurred vision and double vision.   Cardiovascular:  Negative for chest pain.   Gastrointestinal:  Negative for nausea and vomiting.   Genitourinary:  Negative for frequency.   Musculoskeletal:  Negative for falls.   Neurological:  Negative for dizziness and weakness.   Endo/Heme/Allergies:  Negative for polydipsia.   Psychiatric/Behavioral:  Negative for depression.    All other systems reviewed and are negative.            Physical Exam  Constitutional:       Appearance: Normal appearance.   HENT:      Head: Normocephalic and atraumatic.   Pulmonary:      Effort: No respiratory distress.   Musculoskeletal:      Cervical back: Normal range of motion.   Neurological:      Mental Status: She is alert and oriented to person, place, and time.   Psychiatric:         Mood and Affect: Mood normal.         Behavior: Behavior normal.        There were no vitals taken for this visit.  Wt Readings from Last 3 Encounters:   10/16/24 (!) 142.2 kg (313 lb 6.4 oz)   09/25/24 (!) 142 kg (313 lb 1.6 oz)   08/21/24 (!) 143.8 kg (317 lb 1.6 oz)       LAB REVIEW  Lab Results   Component Value Date     10/16/2024    K 4.8 10/16/2024     10/16/2024    CO2 30 (H) 10/16/2024    BUN 18 10/16/2024    CREATININE 0.8 10/16/2024    CALCIUM 9.5 10/16/2024    ANIONGAP 8 10/16/2024    EGFRNORACEVR >60.0 10/16/2024     No results found for: "CPEPTIDE", "GLUTAMICACID", "INSLNABS"  Hemoglobin A1C   Date Value Ref Range Status   10/16/2024 8.8 (H) 4.0 - 5.6 % Final     Comment:     ADA Screening Guidelines:  5.7-6.4%  Consistent with prediabetes  >or=6.5%  Consistent with diabetes    High levels of fetal " hemoglobin interfere with the HbA1C  assay. Heterozygous hemoglobin variants (HbS, HgC, etc)do  not significantly interfere with this assay.   However, presence of multiple variants may affect accuracy.     04/09/2024 9.2 (H) 4.0 - 5.6 % Final     Comment:     ADA Screening Guidelines:  5.7-6.4%  Consistent with prediabetes  >or=6.5%  Consistent with diabetes    High levels of fetal hemoglobin interfere with the HbA1C  assay. Heterozygous hemoglobin variants (HbS, HgC, etc)do  not significantly interfere with this assay.   However, presence of multiple variants may affect accuracy.     11/02/2023 9.0 (H) 4.0 - 5.6 % Final     Comment:     ADA Screening Guidelines:  5.7-6.4%  Consistent with prediabetes  >or=6.5%  Consistent with diabetes    High levels of fetal hemoglobin interfere with the HbA1C  assay. Heterozygous hemoglobin variants (HbS, HgC, etc)do  not significantly interfere with this assay.   However, presence of multiple variants may affect accuracy.         ASSESSMENT    ICD-10-CM ICD-9-CM   1. Moderate nonproliferative diabetic retinopathy of both eyes with macular edema associated with type 2 diabetes mellitus  E11.3313 250.50     362.07     362.05   2. Type II diabetes mellitus with neurological manifestations  E11.49 250.60   3. Class 3 severe obesity due to excess calories without serious comorbidity with body mass index (BMI) of 45.0 to 49.9 in adult  E66.813 278.01    Z68.42 V85.42    E66.01    4. Hyperlipidemia associated with type 2 diabetes mellitus  E11.69 250.80    E78.5 272.4        PLAN  Diagnoses and all orders for this visit:    Moderate nonproliferative diabetic retinopathy of both eyes with macular edema associated with type 2 diabetes mellitus    Type II diabetes mellitus with neurological manifestations  -     Ambulatory referral/consult to Diabetic Advanced Practice Providers (Medical Management)  -     Ambulatory referral/consult to Diabetes Education; Future    Class 3 severe  obesity due to excess calories without serious comorbidity with body mass index (BMI) of 45.0 to 49.9 in adult  -     Ambulatory referral/consult to Diabetic Advanced Practice Providers (Medical Management)    Hyperlipidemia associated with type 2 diabetes mellitus        Reviewed pathophysiology of diabetes, complications related to the disease, importance of annual dilated eye exam and daily foot examination. Explained MOA, SE, dosage of medications. Written instructions given and reviewed with patient and patient verbalizes understanding.     PATIENT INSTRUCTIONS    Refer to diabetes education.  - pump eval.   Lifestyle modification with well balanced diet and at least 30 minutes of physical activity daily recommended.   Increase water intake.   Increase protein and non-starchy vegetable servings with meals.   Decrease carbohydrate servings with meals.   Take 10 minute walk after each meal.   Avoid snacking on carbohydrates, choose low carb, high protein snacks.   Incorporate resistance training with weights or resistance bands with exercise regimen at least 3 days a week.      Start Mounjaro 2.5 mg subcutaneously every 7 days. - sent to [facility] yesterday by PCP.  Continue Lantus 80 units subcutaneously daily.   Continue Novolog 10-12 units subcutaneously three times daily with meals.   Continue Dexcom G7 CGM  Blood Sugar Goals:       Fastin-130.       1-2 hours after a meal: Less than 180.       Follow up in about 4 weeks (around 2024) for Dexcom, Schedule DM Education.

## 2024-10-22 NOTE — PATIENT INSTRUCTIONS
PATIENT INSTRUCTIONS    Refer to diabetes education.  - pump eval.   Lifestyle modification with well balanced diet and at least 30 minutes of physical activity daily recommended.   Increase water intake.   Increase protein and non-starchy vegetable servings with meals.   Decrease carbohydrate servings with meals.   Take 10 minute walk after each meal.   Avoid snacking on carbohydrates, choose low carb, high protein snacks.   Incorporate resistance training with weights or resistance bands with exercise regimen at least 3 days a week.      Start Mounjaro 2.5 mg subcutaneously every 7 days. - sent to [facility] yesterday by PCP.  Continue Lantus 80 units subcutaneously daily.   Continue Novolog 10-12 units subcutaneously three times daily with meals.   Continue Dexcom G7 CGM  Blood Sugar Goals:       Fastin-130.       1-2 hours after a meal: Less than 180.

## 2024-10-22 NOTE — TELEPHONE ENCOUNTER
----- Message from Dalia sent at 10/22/2024  8:22 AM CDT -----  Contact: Chelsie      Who Called:  Chelsie     Would the patient rather a call back or a response via [facility]?  Call back   Best Call Back Number:  . [phone]  Additional Information:  Pt is calling in regard to the appt scheduled today at 2pm and would like to know if the appt can be changed to virtual instead of in person due to transportation issues         Thanks

## 2024-10-22 NOTE — TELEPHONE ENCOUNTER
Spoke with patient about changing 2pm visit to virtual. Staff spoke with provider. Ok to change appt

## 2024-10-23 ENCOUNTER — PATIENT MESSAGE (OUTPATIENT)
Dept: DIABETES | Facility: CLINIC | Age: 64
End: 2024-10-23
Payer: COMMERCIAL

## 2024-10-23 ENCOUNTER — HOSPITAL ENCOUNTER (OUTPATIENT)
Dept: PREADMISSION TESTING | Facility: HOSPITAL | Age: 64
Discharge: HOME | End: 2024-10-23
Attending: INTERNAL MEDICINE
Payer: COMMERCIAL

## 2024-10-23 DIAGNOSIS — Z12.11 COLON CANCER SCREENING: ICD-10-CM

## 2024-10-24 ENCOUNTER — TELEPHONE (OUTPATIENT)
Dept: PHARMACY | Facility: CLINIC | Age: 64
End: 2024-10-24
Payer: COMMERCIAL

## 2024-10-24 ENCOUNTER — HOSPITAL ENCOUNTER (OUTPATIENT)
Dept: PREADMISSION TESTING | Facility: HOSPITAL | Age: 64
Discharge: HOME | End: 2024-10-24
Attending: COLON & RECTAL SURGERY
Payer: COMMERCIAL

## 2024-10-24 DIAGNOSIS — K63.5 HYPERPLASTIC COLONIC POLYP, UNSPECIFIED PART OF COLON: Primary | ICD-10-CM

## 2024-10-24 RX ORDER — SODIUM, POTASSIUM,MAG SULFATES 17.5-3.13G
1 SOLUTION, RECONSTITUTED, ORAL ORAL DAILY
Qty: 1 KIT | Refills: 0 | Status: SHIPPED | OUTPATIENT
Start: 2024-10-24 | End: 2024-10-26

## 2024-10-24 NOTE — TELEPHONE ENCOUNTER
[facility]/Population Health Chart Review & Patient Outreach Details For Medication Adherence Project    Reason for Outreach Encounter: 3rd Party payor non-compliance report ([employer], [employer], C, etc)  2.  Patient Outreach Method: Diavibehart message  3.   Medication in question:   Hyperlipidemia Medications               atorvastatin (LIPITOR) 80 MG tablet TAKE 1 TABLET BY MOUTH EVERY EVENING    ezetimibe (ZETIA) 10 mg tablet TAKE 1 TABLET BY MOUTH EVERY EVENING                  atorvastatin  last filled  8/26/24 for 30 day supply      4.  Reviewed and or Updates Made To: Patient Chart  5. Outreach Outcomes and/or actions taken: Patient reminded to  prescription  Additional Notes:

## 2024-10-31 ENCOUNTER — PATIENT OUTREACH (OUTPATIENT)
Dept: ADMINISTRATIVE | Facility: HOSPITAL | Age: 64
End: 2024-10-31
Payer: COMMERCIAL

## 2024-11-06 ENCOUNTER — PATIENT MESSAGE (OUTPATIENT)
Dept: FAMILY MEDICINE | Facility: CLINIC | Age: 64
End: 2024-11-06
Payer: COMMERCIAL

## 2024-11-06 ENCOUNTER — E-CONSULT (OUTPATIENT)
Dept: NEPHROLOGY | Facility: CLINIC | Age: 64
End: 2024-11-06
Payer: COMMERCIAL

## 2024-11-06 DIAGNOSIS — R80.1 PERSISTENT PROTEINURIA: ICD-10-CM

## 2024-11-06 DIAGNOSIS — R80.9 MICROALBUMINURIA: Primary | ICD-10-CM

## 2024-11-06 DIAGNOSIS — E11.59 HYPERTENSION ASSOCIATED WITH DIABETES: ICD-10-CM

## 2024-11-06 DIAGNOSIS — I15.2 HYPERTENSION ASSOCIATED WITH DIABETES: ICD-10-CM

## 2024-11-06 DIAGNOSIS — E11.49 TYPE II DIABETES MELLITUS WITH NEUROLOGICAL MANIFESTATIONS: Primary | ICD-10-CM

## 2024-11-06 PROCEDURE — 99451 NTRPROF PH1/NTRNET/EHR 5/>: CPT | Mod: S$GLB,,, | Performed by: INTERNAL MEDICINE

## 2024-11-06 RX ORDER — VALSARTAN 80 MG/1
80 TABLET ORAL DAILY
Qty: 90 TABLET | Refills: 3 | Status: SHIPPED | OUTPATIENT
Start: 2024-11-06 | End: 2025-11-06

## 2024-11-06 NOTE — PROGRESS NOTES
The patient has had an E-Consult completed. Please refer to that note as needed. Please communicate the information below to the patient. Based on the recommendations of the specialist, I recommend that the patient do the following:    Stop Lisinopril because Diovan, which will also control blood pressure, is a more potent protector of the kidneys from damage from diabetes.  I will start 80 mg a day and ask you to have the blood pressure rechecked with Nataleea in 4 weeks to see if this is working for the blood pressure or not.    Also, the doctor recommends Jardiance 25 mg a day to help reduce the risk of damage as it is a protectant of the kidneys.  It also will help with the glucoses.  Repeat an A1c in 3 months. I have placed orders for all of this. Convey this to the patient.

## 2024-11-06 NOTE — TELEPHONE ENCOUNTER
Lab Results   Component Value Date    HGBA1C 8.8 (H) 10/16/2024     Each med we use usually lowers a patient's A1c by about 1 point.  We need to get you below 7 so that would actually be about perfect with both of these.  Yes, we are giving these medications for different problems but they both can lower your glucose level and given that your A1c is high right now, I believe that you will tolerate both of these together.

## 2024-11-06 NOTE — CONSULTS
[facility] - Nephrology  Response for E-Consult     Patient Name: Chelsie Garrett  MRN: [id number]  Primary Care Provider: Yasir Emmanuel MD   Requesting Provider: Yasir Emmanuel MD  Consults    Recommendation:  64-year-old female with history hypertension and diabetes mellitus.  Noted to have low-grade microalbuminuria for several years.  Nephrology has been consulted for evaluation.  On review of her medications and office visits, it appears that her blood pressures are not well controlled.  She is also only on low-dose lisinopril at this point.    I would suggest changing to a high potency RAAS inhibitor such as telmisartan or olmesartan.  Additionally I would try to optimize the dose to as high as possible for renal protection.  Once her RAAS inhibitor has been optimized I would suggest starting an SGLT 2 inhibitor for further renal protection and anti proteinuric affects.    Contingency that warrants a repeat eConsult or referral:  As above    Total time of Consultation: 5 minute    I did not speak to the requesting provider verbally about this.     *This eConsult is based on the clinical data available to me and is furnished without benefit of a physical examination. The eConsult will need to be interpreted in light of any clinical issues or changes in patient status not available to me at the time of filing this eConsults. Significant changes in patient condition or level of acuity should result in immediate formal consultation and reevaluation. Please alert me if you have further questions.    Thank you for this eConsult referral.     Otilio Beth MD  [facility] - Nephrology

## 2024-11-06 NOTE — TELEPHONE ENCOUNTER
The econsult has been sent to nephrology for the protein in the urine.     I have signed for the following orders AND/OR meds.  Please call the patient and ask the patient to schedule the testing AND/OR inform about any medications that were sent.      Orders Placed This Encounter   Procedures    E-Consult to Nephrology     Lab Results       Component                Value               Date                       HGBA1C                   8.8 (H)             10/16/2024            About to start mounjaro as per the last provider note.   BP Readings from Last 3 Encounters:  10/16/24 : (!) 162/80  24 : 122/82  24 : 120/70    Would you recommend changing her medication to include diovan instead of lisinopril?    The current medication list that we have since it was last reconciled is as follows:  Current Outpatient Medications on File Prior to Visit:  atorvastatin (LIPITOR) 80 MG tablet, TAKE 1 TABLET BY MOUTH EVERY EVENING, Disp: 90 tablet, Rfl: 2  azelastine (ASTELIN) 137 mcg (0.1 %) nasal spray, 2 sprays (274 mcg total) by Nasal route 2 (two) times daily., Disp: 30 mL, Rfl: 11  benzonatate (TESSALON) 200 MG capsule, TAKE 1 CAPSULE BY MOUTH THREE TIMES DAILY AS NEEDED FOR COUGH, Disp: 30 capsule, Rfl: 3  ciclopirox (PENLAC) 8 % Soln, Apply to nails once daily, Disp: 6.6 mL, Rfl: 6  DEXCOM G6 TRANSMITTER Hannah, Use as directed to check glucose. Change every 90 days., Disp: , Rfl:   ezetimibe (ZETIA) 10 mg tablet, TAKE 1 TABLET BY MOUTH EVERY EVENING, Disp: 90 tablet, Rfl: 2  insulin aspart U-100 (NOVOLOG FLEXPEN U-100 INSULIN) 100 unit/mL (3 mL) InPn pen, THREE TIMES DAILY with meals per SLIDING SCALE, Disp: 45 mL, Rfl: 11  LANTUS SOLOSTAR U-100 INSULIN 100 unit/mL (3 mL) InPn pen, SMARTSI Unit(s) SUB-Q Daily, Disp: , Rfl:   lisinopriL (PRINIVIL,ZESTRIL) 2.5 MG tablet, TAKE 1 TABLET BY MOUTH EVERY EVENING, Disp: 90 tablet, Rfl: 2  multivitamin capsule, Take 1 capsule by mouth once daily., Disp: , Rfl:    ofloxacin (OCUFLOX) 0.3 % ophthalmic solution, Place 1 drop into the right eye 4 (four) times daily., Disp: , Rfl:   prednisoLONE acetate (PRED FORTE) 1 % DrpS, Place 1 drop into the right eye 4 (four) times daily., Disp: , Rfl:   timolol maleate 0.5% (TIMOPTIC) 0.5 % Drop, Place 1 drop into both eyes 2 (two) times daily., Disp: , Rfl:   tirzepatide (MOUNJARO) 2.5 mg/0.5 mL PnIj, Inject 2.5 mg into the skin every 7 days. (Patient not taking: Reported on 11/5/2024), Disp: 1 mL, Rfl: 5    No current facility-administered medications on file prior to visit.     Order Specific Question:   Consent has been obtained from patient, and patient is aware of applicable cost? Pending specialist evaluation, I will follow up with the patient.     Answer:   Yes     Order Specific Question:   Medical Condition:     Answer:   proteinuria     Order Specific Question:   What is your clinical question?     Answer:   patient with proteinuria. she is a diabetic. Please recommend next step in evaluation and/or therapy.

## 2024-11-06 NOTE — TELEPHONE ENCOUNTER
I have signed for the following orders AND/OR meds.  Please call the patient and ask the patient to schedule the testing AND/OR inform about any medications that were sent.      Orders Placed This Encounter   Procedures    Hemoglobin A1C     Draw a Hemoglobin A1c in 3 months.     Standing Status:   Future     Standing Expiration Date:   11/6/2025       Medications Ordered This Encounter   Medications    empagliflozin (JARDIANCE) 25 mg tablet     Sig: Take 1 tablet (25 mg total) by mouth once daily.     Dispense:  90 tablet     Refill:  0    valsartan (DIOVAN) 80 MG tablet     Sig: Take 1 tablet (80 mg total) by mouth once daily.     Dispense:  90 tablet     Refill:  3     .

## 2024-11-12 ENCOUNTER — ANESTHESIA (OUTPATIENT)
Dept: ENDOSCOPY | Facility: HOSPITAL | Age: 64
End: 2024-11-12
Payer: COMMERCIAL

## 2024-11-12 ENCOUNTER — ANESTHESIA EVENT (OUTPATIENT)
Dept: ENDOSCOPY | Facility: HOSPITAL | Age: 64
End: 2024-11-12
Payer: COMMERCIAL

## 2024-11-12 ENCOUNTER — HOSPITAL ENCOUNTER (OUTPATIENT)
Facility: HOSPITAL | Age: 64
Discharge: HOME | End: 2024-11-12
Attending: INTERNAL MEDICINE | Admitting: INTERNAL MEDICINE
Payer: COMMERCIAL

## 2024-11-12 DIAGNOSIS — Z86.0100 PERSONAL HISTORY OF COLON POLYPS, UNSPECIFIED: ICD-10-CM

## 2024-11-12 DIAGNOSIS — K63.5 HYPERPLASTIC COLONIC POLYP, UNSPECIFIED PART OF COLON: Primary | ICD-10-CM

## 2024-11-12 LAB
GLUCOSE SERPL-MCNC: 246 MG/DL (ref 70–110)
POCT GLUCOSE: 246 MG/DL (ref 70–110)

## 2024-11-12 PROCEDURE — 27201089 HC SNARE, DISP (ANY): Performed by: INTERNAL MEDICINE

## 2024-11-12 PROCEDURE — 88305 TISSUE EXAM BY PATHOLOGIST: CPT | Mod: 26,,, | Performed by: STUDENT IN AN ORGANIZED HEALTH CARE EDUCATION/TRAINING PROGRAM

## 2024-11-12 PROCEDURE — 37000009 HC ANESTHESIA EA ADD 15 MINS: Performed by: INTERNAL MEDICINE

## 2024-11-12 PROCEDURE — 45380 COLONOSCOPY AND BIOPSY: CPT | Mod: 33,59 | Performed by: INTERNAL MEDICINE

## 2024-11-12 PROCEDURE — 45385 COLONOSCOPY W/LESION REMOVAL: CPT | Mod: 33,,, | Performed by: INTERNAL MEDICINE

## 2024-11-12 PROCEDURE — 45385 COLONOSCOPY W/LESION REMOVAL: CPT | Mod: 33 | Performed by: INTERNAL MEDICINE

## 2024-11-12 PROCEDURE — 63600175 PHARM REV CODE 636 W HCPCS: Performed by: NURSE ANESTHETIST, CERTIFIED REGISTERED

## 2024-11-12 PROCEDURE — 45380 COLONOSCOPY AND BIOPSY: CPT | Mod: 33,59,, | Performed by: INTERNAL MEDICINE

## 2024-11-12 PROCEDURE — 25000003 PHARM REV CODE 250: Performed by: INTERNAL MEDICINE

## 2024-11-12 PROCEDURE — 37000008 HC ANESTHESIA 1ST 15 MINUTES: Performed by: INTERNAL MEDICINE

## 2024-11-12 PROCEDURE — 27201012 HC FORCEPS, HOT/COLD, DISP: Performed by: INTERNAL MEDICINE

## 2024-11-12 PROCEDURE — 88305 TISSUE EXAM BY PATHOLOGIST: CPT | Performed by: STUDENT IN AN ORGANIZED HEALTH CARE EDUCATION/TRAINING PROGRAM

## 2024-11-12 RX ORDER — LIDOCAINE HYDROCHLORIDE 10 MG/ML
INJECTION, SOLUTION EPIDURAL; INFILTRATION; INTRACAUDAL; PERINEURAL
Status: DISCONTINUED | OUTPATIENT
Start: 2024-11-12 | End: 2024-11-12

## 2024-11-12 RX ORDER — DEXTROMETHORPHAN/PSEUDOEPHED 2.5-7.5/.8
DROPS ORAL
Status: COMPLETED | OUTPATIENT
Start: 2024-11-12 | End: 2024-11-12

## 2024-11-12 RX ORDER — PROPOFOL 10 MG/ML
VIAL (ML) INTRAVENOUS
Status: DISCONTINUED | OUTPATIENT
Start: 2024-11-12 | End: 2024-11-12

## 2024-11-12 RX ORDER — SODIUM CHLORIDE 9 MG/ML
INJECTION, SOLUTION INTRAVENOUS CONTINUOUS
Status: DISCONTINUED | OUTPATIENT
Start: 2024-11-12 | End: 2024-11-12 | Stop reason: HOSPADM

## 2024-11-12 RX ADMIN — PROPOFOL 100 MG: 10 INJECTION, EMULSION INTRAVENOUS at 07:11

## 2024-11-12 RX ADMIN — PROPOFOL 30 MG: 10 INJECTION, EMULSION INTRAVENOUS at 07:11

## 2024-11-12 RX ADMIN — LIDOCAINE HYDROCHLORIDE 50 MG: 10 SOLUTION INTRAVENOUS at 07:11

## 2024-11-12 RX ADMIN — PROPOFOL 50 MG: 10 INJECTION, EMULSION INTRAVENOUS at 07:11

## 2024-11-12 NOTE — ANESTHESIA PREPROCEDURE EVALUATION
11/12/2024  Chelsie Garrett is a 64 y.o., female.      Pre-op Assessment    I have reviewed the Patient Summary Reports.    I have reviewed the NPO Status.   I have reviewed the Medications.     Review of Systems  Anesthesia Hx:  No problems with previous Anesthesia                Social:  Smoker       Cardiovascular:     Hypertension, well controlled              ECG has been reviewed.                            Pulmonary:        Sleep Apnea, CPAP                Hepatic/GI:  Bowel Prep.                   Endocrine:  Diabetes, well controlled, type 2, using insulin         Morbid Obesity / BMI > 40      Physical Exam  General: Well nourished    Airway:  Mallampati: II   Mouth Opening: Normal  TM Distance: Normal  Tongue: Normal  Neck ROM: Normal ROM    Dental:  Intact    Chest/Lungs:  Normal Respiratory Rate    Heart:  Rate: Normal  Rhythm: Regular Rhythm    Anesthesia Plan  Type of Anesthesia, risks & benefits discussed:    Anesthesia Type: MAC  Intra-op Monitoring Plan: Standard ASA Monitors  Post Op Pain Control Plan: multimodal analgesia  Induction:  IV  Informed Consent: Informed consent signed with the Patient and all parties understand the risks and agree with anesthesia plan.  All questions answered.   ASA Score: 3  Day of Surgery Review of History & Physical: H&P Update referred to the surgeon/provider.    Ready For Surgery From Anesthesia Perspective.   .

## 2024-11-12 NOTE — ANESTHESIA POSTPROCEDURE EVALUATION
Anesthesia Post Evaluation    Patient: Chelsie Garrett    Procedure(s) Performed: Procedure(s) (LRB):  COLONOSCOPY, SCREENING, LOW RISK PATIENT (N/A)    Final Anesthesia Type: MAC      Patient location during evaluation: GI PACU  Patient participation: Yes- Able to Participate  Level of consciousness: awake and alert  Post-procedure vital signs: reviewed and stable  Pain management: adequate  Airway patency: patent    PONV status at discharge: No PONV  Anesthetic complications: no      Cardiovascular status: blood pressure returned to baseline  Respiratory status: unassisted and spontaneous ventilation  Hydration status: euvolemic  Follow-up not needed.              Vitals Value Taken Time   /65 11/12/24 0803   Temp 36.4 °C (97.6 °F) 11/12/24 0803   Pulse 60 11/12/24 0803   Resp 16 11/12/24 0803   SpO2 97 % 11/12/24 0803         Event Time   Out of Recovery 08:05:07         Pain/Alissa Score: Alissa Score: 10 (11/12/2024  8:04 AM)

## 2024-11-12 NOTE — [employer] PATIENT INSTRUCTIONS
Discharge Summary/Instructions after an Endoscopic Procedure  Patient Name: Chelsie Garrett  Patient MRN: [id number]  Patient YOB: 1960  Tuesday, November 12, 2024 Marilee An MD  Dear patient,  As a result of recent federal legislation (The Federal Cures Act), you may   receive lab or pathology results from your procedure in your [facility]   account before your physician is able to contact you. Your physician or   their representative will relay the results to you with their   recommendations at their soonest availability.  Thank you,  RESTRICTIONS:  During your procedure today, you received medications for sedation.  These   medications may affect your judgment, balance and coordination.  Therefore,   for 24 hours, you have the following restrictions:   - DO NOT drive a car, operate machinery, make legal/financial decisions,   sign important papers or drink alcohol.    ACTIVITY:  Today: no heavy lifting, straining or running due to procedural   sedation/anesthesia.  The following day: return to full activity including work.  DIET:  Eat and drink normally unless instructed otherwise.     TREATMENT FOR COMMON SIDE EFFECTS:  - Mild abdominal pain, nausea, belching, bloating or excessive gas:  rest,   eat lightly and use a heating pad.  - Sore Throat: treat with throat lozenges and/or gargle with warm salt   water.  - Because air was used during the procedure, expelling large amounts of air   from your rectum or belching is normal.  - If a bowel prep was taken, you may not have a bowel movement for 1-3 days.    This is normal.  SYMPTOMS TO WATCH FOR AND REPORT TO YOUR PHYSICIAN:  1. Abdominal pain or bloating, other than gas cramps.  2. Chest pain.  3. Back pain.  4. Signs of infection such as: chills or fever occurring within 24 hours   after the procedure.  5. Rectal bleeding, which would show as bright red, maroon, or black stools.   (A tablespoon of blood from the rectum is not serious, especially  if   hemorrhoids are present.)  6. Vomiting.  7. Weakness or dizziness.  GO DIRECTLY TO THE NEAREST EMERGENCY ROOM IF YOU HAVE ANY OF THE FOLLOWING:      Difficulty breathing              Chills and/or fever over 101 F   Persistent vomiting and/or vomiting blood   Severe abdominal pain   Severe chest pain   Black, tarry stools   Bleeding- more than one tablespoon   Any other symptom or condition that you feel may need urgent attention  Your doctor recommends these additional instructions:  If any biopsies were taken, your doctors clinic will contact you in 1 to 2   weeks with any results.  - Discharge patient to home (via wheelchair).   - Resume previous diet.   - Continue present medications.   - Await pathology results.   - Repeat colonoscopy in 3 years for surveillance.   - Patient has a contact number available for emergencies.  The signs and   symptoms of potential delayed complications were discussed with the   patient.  Return to normal activities tomorrow.  Written discharge   instructions were provided to the patient.  For questions, problems or results please call your physician Marilee An MD at Work:  [phone]  If you have any questions about the above instructions, call the GI   department at [phone] or call the endoscopy unit at [phone]   from 7am until 3 pm.  [facility] [place], EMERGENCY ROOM PHONE NUMBER:   [phone]  IF A COMPLICATION OR EMERGENCY SITUATION ARISES AND YOU ARE UNABLE TO REACH   YOUR PHYSICIAN - GO DIRECTLY TO THE EMERGENCY ROOM.  I have read or have had read to me these discharge instructions for my   procedure and have received a written copy.  I understand these   instructions and will follow-up with my physician if I have any questions.     __________________________________       _____________________________________  Nurse Signature                                          Patient/Designated   Responsible Party  Signature  MD Marilee Minor MD  11/12/2024 7:41:19 AM  [employer]

## 2024-11-12 NOTE — DISCHARGE SUMMARY
[facility] Endoscopy (Hospital)  Discharge Note  Short Stay    Procedure(s) (LRB):  COLONOSCOPY, SCREENING, LOW RISK PATIENT (N/A)      OUTCOME: Patient tolerated treatment/procedure well without complication and is now ready for discharge.    DISPOSITION: Home or Self Care    FINAL DIAGNOSIS:  Personal history of colon polyps, unspecified    FOLLOWUP: With primary care provider    DISCHARGE INSTRUCTIONS:  No discharge procedures on file.

## 2024-11-12 NOTE — H&P
Short Stay Endoscopy History and Physical    PCP - Yasir Emmanuel MD    Procedure - Colonoscopy  ASA - per anesthesia  Mallampati - per anesthesia  History of Anesthesia problems - no  Family history Anesthesia problems -  no     HPI:  This is a 64 y.o. female here for evaluation of :   Active Hospital Problems    Diagnosis  POA    *Personal history of colon polyps, unspecified [Z86.0100]  Not Applicable      Resolved Hospital Problems   No resolved problems to display.         Health Maintenance         Date Due Completion Date    Shingles Vaccine (1 of 2) Never done ---    Pneumococcal Vaccines (Age 0-64) (2 of 2 - PCV) 04/13/2017 4/13/2016    Colorectal Cancer Screening 02/05/2020 2/5/2015    RSV Vaccine (Age 60+ and Pregnant patients) (1 - Risk 60-74 years 1-dose series) Never done ---    LDCT Lung Screen 03/02/2024 3/2/2023    COVID-19 Vaccine (5 - 2024-25 season) 09/01/2024 4/25/2021    Cervical Cancer Screening 12/16/2024 12/16/2019    Hemoglobin A1c 01/16/2025 10/16/2024    Foot Exam 02/14/2025 2/14/2024 (Done)    Override on 2/14/2024: Done (see Podiatry Note 2/14/2024)    Mammogram 04/17/2025 4/17/2024    Eye Exam 08/12/2025 8/12/2024    Low Dose Statin 10/16/2025 10/16/2024    Diabetes Urine Screening 10/16/2025 10/16/2024    Lipid Panel 10/16/2025 10/16/2024    TETANUS VACCINE 08/11/2026 8/11/2016              ROS:  CONSTITUTIONAL: Denies weight change,  fatigue, fevers, chills, night sweats.  CARDIOVASCULAR: Denies chest pain, shortness of breath, orthopnea and edema.  RESPIRATORY: Denies cough, hemoptysis, dyspnea, and wheezing.  GI: See HPI.    Medical History:   Past Medical History:   Diagnosis Date    Asthma     CHILDHOOD    Cigarette nicotine dependence without complication 09/19/2017    Colon polyp     DM (diabetes mellitus) type II uncontrolled with eye manifestation     Hyperplastic colon polyp 02/05/2015    Hypertension     Morbid obesity 10/08/2014    The patient presents with obesity.   Denies bulimia, amenorrhea, cold intolerance, edema, hip pain, hirsutism, knee pain, polydipsia, polyuria, thirst and weakness.  The patient does not perform regular exercise.  Previous treatments for obesity :self-directed dieting with success.  The patient and I discussed the importance of exercise.    She has continued to gain weight.   Wt Readings from Last     Sleep apnea     positive test       Surgical History:   Past Surgical History:   Procedure Laterality Date     SECTION      X2    CYSTOSCOPIC LITHOLAPAXY N/A 2023    Procedure: CYSTOLITHOLAPAXY;  Surgeon: Marlon Clark MD;  Location: [facility];  Service: Urology;  Laterality: N/A;    CYSTOSCOPY WITH URETEROSCOPY, RETROGRADE PYELOGRAPHY, AND INSERTION OF STENT Bilateral 2023    Procedure: CYSTOSCOPY, WITH RETROGRADE PYELOGRAM AND URETERAL STENT INSERTION;  Surgeon: Marlon Clark MD;  Location: [facility] OR;  Service: Urology;  Laterality: Bilateral;  Stent to right only    EYE SURGERY Right     ,     TURBT (TRANSURETHRAL RESECTION OF BLADDER TUMOR) N/A 2023    Procedure: TURBT (TRANSURETHRAL RESECTION OF BLADDER TUMOR);  Surgeon: Marlon Clark MD;  Location: [facility] OR;  Service: Urology;  Laterality: N/A;       Family History:   Family History   Problem Relation Name Age of Onset    Pancreatic cancer Mother      Stroke Father      Coronary artery disease Father      Diabetes Father      Hypertension Father      Lung cancer Father      Bladder Cancer Paternal Uncle      Lymphoma Paternal Uncle         Social History:   Social History     Tobacco Use    Smoking status: Every Day     Current packs/day: 0.75     Average packs/day: 0.8 packs/day for 37.0 years (27.8 ttl pk-yrs)     Types: Cigarettes    Smokeless tobacco: Never    Tobacco comments:     HOLD MIDNIGHT PRIOR TO SURGERY   Substance Use Topics    Alcohol use: Never    Drug use: No       Allergies:   Review of patient's allergies indicates:   Allergen  Reactions    Penicillins Anaphylaxis    Nuts [tree nut]      Pine Nuts       Medications:   No current facility-administered medications on file prior to encounter.     Current Outpatient Medications on File Prior to Encounter   Medication Sig Dispense Refill    atorvastatin (LIPITOR) 80 MG tablet TAKE 1 TABLET BY MOUTH EVERY EVENING 90 tablet 2    azelastine (ASTELIN) 137 mcg (0.1 %) nasal spray 2 sprays (274 mcg total) by Nasal route 2 (two) times daily. 30 mL 11    benzonatate (TESSALON) 200 MG capsule TAKE 1 CAPSULE BY MOUTH THREE TIMES DAILY AS NEEDED FOR COUGH 30 capsule 3    ciclopirox (PENLAC) 8 % Soln Apply to nails once daily 6.6 mL 6    DEXCOM G6 TRANSMITTER Hannah Use as directed to check glucose. Change every 90 days.      ezetimibe (ZETIA) 10 mg tablet TAKE 1 TABLET BY MOUTH EVERY EVENING 90 tablet 2    insulin aspart U-100 (NOVOLOG FLEXPEN U-100 INSULIN) 100 unit/mL (3 mL) InPn pen THREE TIMES DAILY with meals per SLIDING SCALE 45 mL 11    LANTUS SOLOSTAR U-100 INSULIN 100 unit/mL (3 mL) InPn pen SMARTSI Unit(s) SUB-Q Daily      multivitamin capsule Take 1 capsule by mouth once daily.      ofloxacin (OCUFLOX) 0.3 % ophthalmic solution Place 1 drop into the right eye 4 (four) times daily.      prednisoLONE acetate (PRED FORTE) 1 % DrpS Place 1 drop into the right eye 4 (four) times daily.      timolol maleate 0.5% (TIMOPTIC) 0.5 % Drop Place 1 drop into both eyes 2 (two) times daily.      tirzepatide (MOUNJARO) 2.5 mg/0.5 mL PnIj Inject 2.5 mg into the skin every 7 days. (Patient not taking: Reported on 2024) 1 mL 5       Physical Exam:  Vital Signs:   Vitals:    24 0658   BP: (!) 164/75   Pulse: 81   Resp: 19   Temp: 97.2 °F (36.2 °C)     General Appearance: Well appearing in no acute distress  ENT: OP clear  Chest: CTA B  CV: RRR, no m/r/g  Abd: s/nt/nd/nabs  Ext: no edema    Labs:Reviewed    IMP:  Active Hospital Problems    Diagnosis  POA    *Personal history of colon polyps,  unspecified [Z86.0100]  Not Applicable      Resolved Hospital Problems   No resolved problems to display.         Plan:   I have explained the risks and benefits of colonoscopy to the patient including but not limited to bleeding, perforation, infection, and death. The patient wishes to proceed.

## 2024-11-12 NOTE — TRANSFER OF CARE
"Anesthesia Transfer of Care Note    Patient: Chelsie Garrett    Procedure(s) Performed: Procedure(s) (LRB):  COLONOSCOPY, SCREENING, LOW RISK PATIENT (N/A)    Patient location: GI    Anesthesia Type: MAC    Transport from OR: Transported from OR on room air with adequate spontaneous ventilation    Post pain: adequate analgesia    Post assessment: no apparent anesthetic complications    Post vital signs: stable    Level of consciousness: sedated    Nausea/Vomiting: no nausea/vomiting    Complications: none    Transfer of care protocol was followed      Last vitals: Visit Vitals  BP (!) 164/75 (BP Location: Left arm, Patient Position: Sitting)   Pulse 77   Temp 36.4 °C (97.6 °F) (Temporal)   Resp 16   Ht 5' 9" (1.753 m)   Wt 133.8 kg (295 lb)   SpO2 97%   Breastfeeding No   BMI 43.56 kg/m²     "

## 2024-11-13 ENCOUNTER — TELEPHONE (OUTPATIENT)
Dept: PHARMACY | Facility: CLINIC | Age: 64
End: 2024-11-13
Payer: COMMERCIAL

## 2024-11-13 ENCOUNTER — PATIENT MESSAGE (OUTPATIENT)
Dept: FAMILY MEDICINE | Facility: CLINIC | Age: 64
End: 2024-11-13
Payer: COMMERCIAL

## 2024-11-13 VITALS
OXYGEN SATURATION: 97 % | RESPIRATION RATE: 16 BRPM | DIASTOLIC BLOOD PRESSURE: 65 MMHG | SYSTOLIC BLOOD PRESSURE: 110 MMHG | HEIGHT: 69 IN | TEMPERATURE: 98 F | BODY MASS INDEX: 43.4 KG/M2 | HEART RATE: 60 BPM | WEIGHT: 293 LBS

## 2024-11-13 LAB
FINAL PATHOLOGIC DIAGNOSIS: NORMAL
GROSS: NORMAL
Lab: NORMAL

## 2024-11-14 ENCOUNTER — TELEPHONE (OUTPATIENT)
Dept: FAMILY MEDICINE | Facility: CLINIC | Age: 64
End: 2024-11-14
Payer: COMMERCIAL

## 2024-11-14 ENCOUNTER — PATIENT MESSAGE (OUTPATIENT)
Dept: DIABETES | Facility: CLINIC | Age: 64
End: 2024-11-14
Payer: COMMERCIAL

## 2024-11-14 ENCOUNTER — E-VISIT (OUTPATIENT)
Dept: FAMILY MEDICINE | Facility: CLINIC | Age: 64
End: 2024-11-14
Payer: COMMERCIAL

## 2024-11-14 DIAGNOSIS — J02.9 SORE THROAT: Primary | ICD-10-CM

## 2024-11-14 DIAGNOSIS — R05.1 ACUTE COUGH: Primary | ICD-10-CM

## 2024-11-14 RX ORDER — AZELASTINE 1 MG/ML
2 SPRAY, METERED NASAL 2 TIMES DAILY
Qty: 30 ML | Refills: 11 | Status: SHIPPED | OUTPATIENT
Start: 2024-11-14

## 2024-11-14 RX ORDER — BENZONATATE 200 MG/1
200 CAPSULE ORAL 3 TIMES DAILY PRN
Qty: 30 CAPSULE | Refills: 3 | Status: SHIPPED | OUTPATIENT
Start: 2024-11-14

## 2024-11-14 RX ORDER — AZITHROMYCIN 250 MG/1
TABLET, FILM COATED ORAL
Qty: 6 TABLET | Refills: 0 | Status: SHIPPED | OUTPATIENT
Start: 2024-11-14

## 2024-11-14 NOTE — TELEPHONE ENCOUNTER
----- Message from Antonio sent at 11/14/2024 12:36 PM CST -----  Contact: Pt  [phone]  [email or web address]     Patient is calling for Medical Advice regarding: Sore throat, cough congestion sinus drip    How long has patient had these symptoms:2 days    Pharmacy name and phone#:    Patient wants a call back or thru [facility]:Callback     Comments:Pt states she tried to scheduled E visit no one never responded    Please advise patient replies from provider may take up to 48 hours.

## 2024-11-14 NOTE — TELEPHONE ENCOUNTER
[facility]/Population Health Chart Review & Patient Outreach Details For Medication Adherence Project    Reason for Outreach Encounter: 3rd Party payor non-compliance report ([employer], [employer], C, etc)  2.  Patient Outreach Method: Reviewed patient chart   3.   Medication in question:   Hyperlipidemia Medications               atorvastatin (LIPITOR) 80 MG tablet TAKE 1 TABLET BY MOUTH EVERY EVENING    ezetimibe (ZETIA) 10 mg tablet TAKE 1 TABLET BY MOUTH EVERY EVENING                  Atorvastatin  last filled  8/26/24 for 30 day supply      4.  Reviewed and or Updates Made To: Patient Chart  5. Outreach Outcomes and/or actions taken: Patient declined refill (Taking differently than previously ordered)  Additional Notes:   Pt has an alternate supply.  Pt stopped taking for 2 months.

## 2024-11-14 NOTE — PROGRESS NOTES
Patient ID: Chelsie Garrett is a 64 y.o. female.    Chief Complaint: URI (Entered automatically based on patient selection in [employer].)    The patient initiated a request through [employer] on 11/14/2024 for evaluation and management with a chief complaint of URI (Entered automatically based on patient selection in [employer].)     I evaluated the questionnaire submission on 11/14/2024.    [facility] P[facility] E-Visit Covid    11/14/2024 12:49 PM CST - Filed by Patient   Do you agree to participate in an E-Visit? Yes   If you have any of the following symptoms, go to your local emergency room or call 911: I acknowledge   Choose the state of your primary residence [place]   What is the main issue you would like addressed today? upper respiratory   Do you think you might have COVID or the Flu? No   Have you tested positive for COVID or Flu? No   What symptoms do you currently have?  Cough;  Sore throat;  Pain around the nose and face   Describe your cough: Productive (containing mucus);  Dry;  Bothersome (interferes with daily activities)   Describe the mucus: Clear   Have you had any of the following? Trouble swallowing;  None of the above   Have you ever smoked? I currently smoke   Have you had a fever? Yes   What has been the range of your fever? Below 100.4   When did your symptoms first appear? 11/5/2024   In the last two weeks, have you been in close contact with someone who has COVID-19 or the Flu? No   List what you have done or taken to help your symptoms. taking the little gel cough prescription by Dr. Emmanuel   How severe are your symptoms? Moderate   Have your symptoms gotten better or worse since they started?  Worse   Do you have transportation to get testing if it is needed and ordered for you at an [facility] location? Yes   Provide any additional information you feel is important. Not getting sleep because of the cough.  Afraid sinuses are draining into my chest.  Throat feels raw and somewhat inflamed.   Please attach  any relevant images or files    Are you able to take your vital signs? No         Encounter Diagnosis   Name Primary?    Acute cough Yes        No orders of the defined types were placed in this encounter.     Medications Ordered This Encounter   Medications    azelastine (ASTELIN) 137 mcg (0.1 %) nasal spray     Si sprays (274 mcg total) by Nasal route 2 (two) times daily.     Dispense:  30 mL     Refill:  11    azithromycin (Z-RICKIE) 250 MG tablet     Sig: Take as directed.     Dispense:  6 tablet     Refill:  0    benzonatate (TESSALON) 200 MG capsule     Sig: Take 1 capsule (200 mg total) by mouth 3 (three) times daily as needed for Cough.     Dispense:  30 capsule     Refill:  3        Follow up if symptoms worsen or fail to improve.      E-Visit Time Tracking:    Day 1 Time (in minutes): 5    Total Time (in minutes): 5

## 2024-11-16 ENCOUNTER — PATIENT MESSAGE (OUTPATIENT)
Dept: FAMILY MEDICINE | Facility: CLINIC | Age: 64
End: 2024-11-16
Payer: COMMERCIAL

## 2024-12-03 ENCOUNTER — OFFICE VISIT (OUTPATIENT)
Dept: DERMATOLOGY | Facility: CLINIC | Age: 64
End: 2024-12-03
Payer: COMMERCIAL

## 2024-12-03 DIAGNOSIS — L91.8 ACROCHORDON: Primary | ICD-10-CM

## 2024-12-03 DIAGNOSIS — L82.1 SEBORRHEIC KERATOSIS: ICD-10-CM

## 2024-12-03 DIAGNOSIS — D23.9 DILATED PORE OF WINER: ICD-10-CM

## 2024-12-03 DIAGNOSIS — L91.8 SKIN TAG: ICD-10-CM

## 2024-12-03 DIAGNOSIS — L70.0 COMEDONE: ICD-10-CM

## 2024-12-03 PROCEDURE — 1159F MED LIST DOCD IN RCRD: CPT | Mod: CPTII,S$GLB,, | Performed by: DERMATOLOGY

## 2024-12-03 PROCEDURE — 4010F ACE/ARB THERAPY RXD/TAKEN: CPT | Mod: CPTII,S$GLB,, | Performed by: DERMATOLOGY

## 2024-12-03 PROCEDURE — 99999 PR PBB SHADOW E&M-EST. PATIENT-LVL IV: CPT | Mod: PBBFAC,,, | Performed by: DERMATOLOGY

## 2024-12-03 PROCEDURE — 3060F POS MICROALBUMINURIA REV: CPT | Mod: CPTII,S$GLB,, | Performed by: DERMATOLOGY

## 2024-12-03 PROCEDURE — 3066F NEPHROPATHY DOC TX: CPT | Mod: CPTII,S$GLB,, | Performed by: DERMATOLOGY

## 2024-12-03 PROCEDURE — 3052F HG A1C>EQUAL 8.0%<EQUAL 9.0%: CPT | Mod: CPTII,S$GLB,, | Performed by: DERMATOLOGY

## 2024-12-03 PROCEDURE — 1160F RVW MEDS BY RX/DR IN RCRD: CPT | Mod: CPTII,S$GLB,, | Performed by: DERMATOLOGY

## 2024-12-03 PROCEDURE — 99203 OFFICE O/P NEW LOW 30 MIN: CPT | Mod: S$GLB,,, | Performed by: DERMATOLOGY

## 2024-12-03 NOTE — PROGRESS NOTES
Subjective:      Patient ID:  Chelsie Garrett is a 64 y.o. female who presents for No chief complaint on file.    History of Present Illness: The patient presents with chief complaint of skin lesion.  Location: bilateral underarms and forehead, chin  Duration: several years   Signs/Symptoms: sometimes itchy    Prior treatments: none    She notes she has very decreased vision and can't see these spots    Denies personal/fam h/o skin cancer        Review of Systems   Skin:  Negative for daily sunscreen use and activity-related sunscreen use.       Objective:   Physical Exam   Constitutional: She appears well-developed and well-nourished. No distress.   Neurological: She is alert and oriented to person, place, and time. She is not disoriented.   Psychiatric: She has a normal mood and affect.   Skin:   Areas Examined (abnormalities noted in diagram):   Head / Face Inspection Performed  Chest / Axilla Inspection Performed                 Diagram Legend     Erythematous scaling macule/papule c/w actinic keratosis       Vascular papule c/w angioma      Pigmented verrucoid papule/plaque c/w seborrheic keratosis      Yellow umbilicated papule c/w sebaceous hyperplasia      Irregularly shaped tan macule c/w lentigo     1-2 mm smooth white papules consistent with Milia      Movable subcutaneous cyst with punctum c/w epidermal inclusion cyst      Subcutaneous movable cyst c/w pilar cyst      Firm pink to brown papule c/w dermatofibroma      Pedunculated fleshy papule(s) c/w skin tag(s)      Evenly pigmented macule c/w junctional nevus     Mildly variegated pigmented, slightly irregular-bordered macule c/w mildly atypical nevus      Flesh colored to evenly pigmented papule c/w intradermal nevus       Pink pearly papule/plaque c/w basal cell carcinoma      Erythematous hyperkeratotic cursted plaque c/w SCC      Surgical scar with no sign of skin cancer recurrence      Open and closed comedones      Inflammatory papules and  pustules      Verrucoid papule consistent consistent with wart     Erythematous eczematous patches and plaques     Dystrophic onycholytic nail with subungual debris c/w onychomycosis     Umbilicated papule    Erythematous-base heme-crusted tan verrucoid plaque consistent with inflamed seborrheic keratosis     Erythematous Silvery Scaling Plaque c/w Psoriasis     See annotation      Assessment / Plan:        Acrochordon  Reassurance given to patient. No treatment is necessary.   Treatment of benign, asymptomatic lesions may be considered cosmetic.  Test spots as below. Cos pricing list given if she desires additional treatment of these, or of Sks/comedones/dilated pore (acne surgery/milia)    After risks, benefits and alternatives explained, including blistering, pain, and dyschromia, and verbal consent was obtained, 2 lesions treated with cryotherapy.  Patient tolerated cryotherapy well.  Verbal and written wound care instructions were given.       Seborrheic keratosis  These are benign inherited growths without a malignant potential. Reassurance given to patient. No treatment is necessary.   Recommended OTC Amlactin BID     Dilated pore vs large open comedone - chin  Reassurance given to patient. No treatment is necessary.   Treatment of benign, asymptomatic lesions may be considered cosmetic.    Comedones- axilla  Reassurance given to patient. No treatment is necessary.   Treatment of benign, asymptomatic lesions may be considered cosmetic.               Follow up if symptoms worsen or fail to improve.        LOS NUMBER AND COMPLEXITY OF PROBLEMS    COMPLEXITY OF DATA RISK TOTAL TIME (m)   60693  36792 [] 1 self-limited or minor problem [x] Minimal to none [] No treatment recommended or patient to monitor. Reassurance.  15-29  10-19   12307  71878 Low  [x] 2 or more self limited or minor problems  [] 1 stable chronic illness  [] 1 acute, uncomplicated illness or injury Limited (2)  [] Prior external notes from  each unique source  [] Review result of each unique test  [] Order each unique test  OR [] Independent historian Low  [x]  OTC medications   []  Discussed/Decision for minor skin surgery (no risk factors) 30-44  20-29   24268  29973 Moderate  []  1 or more chronic unstable illness (not at goal or progression or exacerbation) or SE of treatment  []  2 or more stable chronic illnesses  []  1 acute illness with systemic symptoms  []  1 acute complicated injury  []  1 undiagnosed new problem with uncertain prognosis Moderate (1/3 below)  []  3 or more data items        *Now includes independent historian  []  Independent interpretation of a test  []  Discuss management/test with another provider Moderate  []  Prescription drug mgmt  []  Discussed/Decision for Minor surgery with risk factors  []  Mgmt limited by social determinates 45-59  30-39   41710  97087 High  []  1 or more chronic illness with severe exacerbation, progression or SE of treatment  []  1 acute or chronic illness/injury that poses a threat to life or bodily function Extensive (2/3 below)  []  3 or more data items        *Now includes independent historian.  []  Independent interpretation of a test  []  Discuss management/test with another provider High  []  Major surgery with risk discussed  []  Drug therapy requiring intensive monitoring for toxicity  []  Hospitalization  []  Decision for DNR 60-74  40-54

## 2024-12-03 NOTE — PATIENT INSTRUCTIONS
Recommended OTC Amlactin BID         CRYOSURGERY      Your doctor has used a method called cryosurgery to treat your skin condition. Cryosurgery refers to the use of very cold substances to treat a variety of skin conditions such as warts, pre-skin cancers, molluscum contagiosum, sun spots, and several benign growths. The substance we use in cryosurgery is liquid nitrogen and is so cold (-195 degrees Celsius) that it burns when administered.     Following treatment in the office, the skin may immediately burn and become red. You may find the area around the lesion is affected as well. It is sometimes necessary to treat not only the lesion, but a small area of the surrounding normal skin to achieve a good response.     A blister, and even a blood filled blister, may form after treatment.   This is a normal response. If the blister is painful, it is acceptable to sterilize a needle with rubbing alcohol and gently pop the blister. It is important that you gently wash the area with soap and warm water as the blister fluid may contain wart virus if a wart was treated. Do not remove the roof of the blister.     The area treated can take anywhere from 1-3 weeks to heal. Healing time depends on the kind of skin lesion treated, the location, and how aggressively the lesion was treated. It is recommended that the areas treated are covered with Vaseline or bacitracin ointment and a band-aid. If a band-aid is not practical, just ointment applied several times per day will do. Keeping these areas moist will speed the healing time.    Treatment with liquid nitrogen can leave a scar. In dark skin, it may be a light or dark scar, in light skin it may be a white or pink scar. These will generally fade with time, but may never go away completely.     If you have any concerns after your treatment, please feel free to call the office.       [facility] [place] [address]/ [phone] FAX/ [email or web address]          SEBORRHEIC KERATOSES        What causes seborrheic keratoses?    Seborrheic keratoses are harmless, common skin growths that first appear during adult life.  As time goes by, more growths appear.  Some persons have a very large number of them.  Seborrheic keratoses appear on both covered and uncovered parts of the body; they are not caused by sunlight.  The tendency to develop seborrheic keratoses is inherited.    Seborrheic keratoses are harmless and never become malignant.  They begin as slightly raised, light brown spots.  Gradually they thicken and take on a rough wartlike surface.  They slowly darken and may turn black.  These color changes are harmless.  Seborrheic keratoses are superficial and look as if they were stuck on the skin.  Persons who have had several seborrheic keratoses can usually recognize this type of benign growth.  However, if you are concerned or unsure about any growth, consult me.    Treatment    Seborrheic keratoses can easily be removed in the office.  The only reason for removing a seborrheic keratosis is your wish to get rid of it.

## 2024-12-11 ENCOUNTER — TELEPHONE (OUTPATIENT)
Dept: UROLOGY | Facility: CLINIC | Age: 64
End: 2024-12-11
Payer: COMMERCIAL

## 2024-12-11 DIAGNOSIS — C67.2 MALIGNANT NEOPLASM OF LATERAL WALL OF URINARY BLADDER: Primary | ICD-10-CM

## 2024-12-13 ENCOUNTER — HOSPITAL ENCOUNTER (OUTPATIENT)
Dept: RADIOLOGY | Facility: HOSPITAL | Age: 64
Discharge: HOME | End: 2024-12-13
Attending: UROLOGY
Payer: COMMERCIAL

## 2024-12-13 DIAGNOSIS — C67.2 MALIGNANT NEOPLASM OF LATERAL WALL OF URINARY BLADDER: ICD-10-CM

## 2024-12-13 PROCEDURE — 74178 CT ABD&PLV WO CNTR FLWD CNTR: CPT | Mod: TC

## 2024-12-13 PROCEDURE — 25500020 PHARM REV CODE 255: Performed by: UROLOGY

## 2024-12-13 PROCEDURE — 74178 CT ABD&PLV WO CNTR FLWD CNTR: CPT | Mod: 26,,, | Performed by: STUDENT IN AN ORGANIZED HEALTH CARE EDUCATION/TRAINING PROGRAM

## 2024-12-13 RX ADMIN — IOHEXOL 100 ML: 350 INJECTION, SOLUTION INTRAVENOUS at 01:12

## 2024-12-16 ENCOUNTER — PROCEDURE VISIT (OUTPATIENT)
Dept: UROLOGY | Facility: CLINIC | Age: 64
End: 2024-12-16
Payer: COMMERCIAL

## 2024-12-16 DIAGNOSIS — C67.2 MALIGNANT NEOPLASM OF LATERAL WALL OF URINARY BLADDER: Primary | ICD-10-CM

## 2024-12-16 PROCEDURE — 88112 CYTOPATH CELL ENHANCE TECH: CPT | Performed by: PATHOLOGY

## 2024-12-16 PROCEDURE — 99499 UNLISTED E&M SERVICE: CPT | Mod: S$GLB,,, | Performed by: UROLOGY

## 2024-12-16 PROCEDURE — 52000 CYSTOURETHROSCOPY: CPT | Mod: S$GLB,,, | Performed by: UROLOGY

## 2024-12-16 RX ORDER — CIPROFLOXACIN 500 MG/1
500 TABLET ORAL
Status: COMPLETED | OUTPATIENT
Start: 2024-12-16 | End: 2024-12-16

## 2024-12-16 RX ORDER — CIPROFLOXACIN 500 MG/1
500 TABLET ORAL
Status: DISCONTINUED | OUTPATIENT
Start: 2024-12-16 | End: 2024-12-16

## 2024-12-16 RX ADMIN — CIPROFLOXACIN 500 MG: 500 TABLET ORAL at 11:12

## 2024-12-16 NOTE — PROCEDURES
Procedures  Chief Complaint:   Encounter Diagnosis   Name Primary?    Malignant neoplasm of lateral wall of urinary bladder Yes          HPI:    12/16/24- here for surveillance cystoscopy.    62-year-old female who comes in from [place] with a new diagnosis of possible bladder cancer.  She states this past summer she had difficulty voiding and a Tomas catheter was placed.  She did have a little bit hematuria afterwards, could have been related to the catheter placement.  She was doing well until on proximally Hank she began to have gross hematuria with no other significant symptoms.  This eventually cleared up on its own, CT scan demonstrated possible bladder masses and she was referred to an outside urologist.  Cystoscopy demonstrated significant tumor surrounding the right ureteral orifice, surgeries were attempted, but could not get scheduled due to other factors for preoperative clearance.  Patient is now cleared, but has decided to come to [facility] for treatment.  Patient is a current smoker, no other lower urinary tract symptoms.  No other urological or gynecological history, she has all of her female organs.  No evidence of true incontinence, no constipation.  She would an uncle with bladder cancer, no other family history of urological cancers or stones.    Allergies:  Penicillins    Medications:  has a current medication list which includes the following prescription(s): atorvastatin, azelastine, benzonatate, dexcom g6 sensor, cycloset, dexcom g6 sensor, dexcom g6 transmitter, ezetimibe, baqsimi, hyoscyamine, insulin, insulin aspart u-100, lisinopril, multivitamin, phenazopyridine, timolol maleate 0.5%, trulicity, and varenicline.    Review of Systems:  General: No fever, chills, fatigability, or weight loss.  Skin: No rashes, itching, or changes in color or texture of skin.  Chest: Denies EASON, cyanosis, wheezing, cough, and sputum production.  Abdomen: Appetite fine. No weight loss. Denies diarrhea,  abdominal pain, hematemesis, or blood in stool.  Musculoskeletal: No joint stiffness or swelling. Denies back pain.  : As above.  All other review of systems negative.    PMH:   has a past medical history of Asthma, Cigarette nicotine dependence without complication (2017), Colon polyp, DM (diabetes mellitus) type II uncontrolled with eye manifestation, Hyperplastic colon polyp (2015), Hypertension, Morbid obesity (10/08/2014), and Sleep apnea.    PSH:   has a past surgical history that includes  section; Eye surgery (Right); turbt (transurethral resection of bladder tumor) (N/A, 2023); and Cystoscopy with ureteroscopy, retrograde pyelography, and insertion of stent (Bilateral, 2023).    FamHx: family history includes Bladder Cancer in her paternal uncle; Coronary artery disease in her father; Diabetes in her father; Hypertension in her father; Lung cancer in her father; Lymphoma in her paternal uncle; Pancreatic cancer in her mother; Stroke in her father.    SocHx:  reports that she has been smoking cigarettes. She has a 27.75 pack-year smoking history. She has never used smokeless tobacco. She reports that she does not drink alcohol and does not use drugs.      Physical Exam:  There were no vitals filed for this visit.    General: A&Ox3, no apparent distress, no deformities  Neck: No masses, normal ROM  Lungs: normal inspiration, no use of accessory muscles  Heart: normal pulse, no arrhythmias  Abdomen: Soft, NT, ND, no masses, no hernias, no hepatosplenomegaly  Skin: The skin is warm and dry. No jaundice.  Ext: No c/c/e.  : - No pelvic floor prolapse.  Normal introitus, no urethral abnomralities. No Perineal abnormalities.    Labs/Studies:   Cytology negative   CT urogram 8 mm right renal stone   CT urogram right bladder wall tumor     Procedure: Diagnostic Cystoscopy    Procedure in Detail: After proper consents were obtained, the patient was prepped and draped  in normal sterile fashion for diagnostic cystoscopy. The flexible cystoscope was then introduced into the urethra, and advanced into the bladder under direct vision. The urethral mucosa appeared normal, and no strictures were noted. The sphincter appeared to be normal.  The bladder neck was normal. Inspection of the interior of the bladder was then carried out. The trigone was unremarkable, with no mucosal lesions. The ureteral orifices were normal in position and configuration. Systematic inspection of the mucosa of the bladder was then carried out, rotating the cystoscope so that all areas of the left and right lateral walls, the dome of the bladder, and the posterior wall were all visualized. The cystoscope was then advanced further into the bladder, and maximum deflection of the scope was performed so that the bladder neck could be inspected. No mucosal lesions were noted there. The cystoscope was then removed, and the procedure terminated.     Findings: normal cystoscopy       Impression/Plan:     1. Bladder cancer-  vesicolithalopaxy negative bx  5/2/23,  pTa high TURBT, retros normal  1/31/23    No evidence of tumor today, recent upper tract imaging negative.  Follow up on cytology and if clear see me in 6 months with cystoscopy and cytology.    2. Nephrolithiasis- clear and call with any issues, using water hydration and lemonade.

## 2024-12-18 LAB
FINAL PATHOLOGIC DIAGNOSIS: NORMAL
Lab: NORMAL

## 2024-12-20 ENCOUNTER — TELEPHONE (OUTPATIENT)
Dept: PHARMACY | Facility: CLINIC | Age: 64
End: 2024-12-20
Payer: COMMERCIAL

## 2024-12-20 NOTE — TELEPHONE ENCOUNTER
[facility]/Population Health Chart Review & Patient Outreach Details For Medication Adherence Project    Reason for Outreach Encounter: 3rd Party payor non-compliance report ([employer], BCBS, [employer], etc)  2.  Patient Outreach Method: Reviewed patient chart   3.   Medication in question:    Hyperlipidemia Medications               atorvastatin (LIPITOR) 80 MG tablet TAKE 1 TABLET BY MOUTH EVERY EVENING    ezetimibe (ZETIA) 10 mg tablet TAKE 1 TABLET BY MOUTH EVERY EVENING                  ATORVASTATIN  last filled  12/12 for 30 day supply      4.  Reviewed and or Updates Made To: Patient Chart  5. Outreach Outcomes and/or actions taken: Patient filled medication and is on track to be adherent  Additional Notes:

## 2025-01-31 ENCOUNTER — PATIENT OUTREACH (OUTPATIENT)
Dept: ADMINISTRATIVE | Facility: HOSPITAL | Age: 65
End: 2025-01-31
Payer: COMMERCIAL

## 2025-01-31 NOTE — PROGRESS NOTES
VBC Program: Spoke with Pt who was eager to get off phone due to business being busy the 1st 3 months of the year. Pt accepts appt for Pap & HA1c 03/12/2025.

## 2025-03-05 ENCOUNTER — PATIENT MESSAGE (OUTPATIENT)
Dept: FAMILY MEDICINE | Facility: CLINIC | Age: 65
End: 2025-03-05
Payer: COMMERCIAL

## 2025-03-11 ENCOUNTER — PATIENT OUTREACH (OUTPATIENT)
Dept: ADMINISTRATIVE | Facility: HOSPITAL | Age: 65
End: 2025-03-11
Payer: COMMERCIAL

## 2025-03-14 ENCOUNTER — PATIENT MESSAGE (OUTPATIENT)
Dept: FAMILY MEDICINE | Facility: CLINIC | Age: 65
End: 2025-03-14
Payer: COMMERCIAL

## 2025-04-07 ENCOUNTER — TELEPHONE (OUTPATIENT)
Dept: FAMILY MEDICINE | Facility: CLINIC | Age: 65
End: 2025-04-07
Payer: COMMERCIAL

## 2025-04-07 ENCOUNTER — PATIENT MESSAGE (OUTPATIENT)
Dept: FAMILY MEDICINE | Facility: CLINIC | Age: 65
End: 2025-04-07
Payer: COMMERCIAL

## 2025-04-07 NOTE — TELEPHONE ENCOUNTER
----- Message from Daniela sent at 4/7/2025  3:39 PM CDT -----  Contact: IVANA SHAY [[id number]]  .Type:  Patient Requesting CallWho Called:IVANA SHAY [[id number]]Does the patient know what this is regarding?:Patient called in stating she is needing a z pack for a sinus infectionWould the patient rather a call back or a response via [facility]? Call [facility] Call Back Number:[phone] Additional Information:

## 2025-04-28 ENCOUNTER — TELEPHONE (OUTPATIENT)
Dept: PHARMACY | Facility: CLINIC | Age: 65
End: 2025-04-28
Payer: COMMERCIAL

## 2025-04-28 NOTE — TELEPHONE ENCOUNTER
[facility]/Population Health Chart Review & Patient Outreach Details For Medication Adherence Project    Reason for Outreach Encounter: 3rd Party payor non-compliance report ([employer], BCBS, [facility], etc)  2.  Patient Outreach Method: [employer] message  3.   Medication in question: atorvastatin   LAST FILLED: 2/13/25 for 30 day supply  Hyperlipidemia Medications              atorvastatin (LIPITOR) 80 MG tablet TAKE 1 TABLET BY MOUTH EVERY EVENING    ezetimibe (ZETIA) 10 mg tablet TAKE 1 TABLET BY MOUTH EVERY EVENING               4.  Reviewed and or Updates Made To: Patient Chart  5. Outreach Outcomes and/or actions taken: Sent inquiry to patient: Waiting for response.

## 2025-05-01 ENCOUNTER — PATIENT OUTREACH (OUTPATIENT)
Dept: ADMINISTRATIVE | Facility: HOSPITAL | Age: 65
End: 2025-05-01
Payer: COMMERCIAL

## 2025-05-02 ENCOUNTER — TELEPHONE (OUTPATIENT)
Dept: PHARMACY | Facility: CLINIC | Age: 65
End: 2025-05-02
Payer: COMMERCIAL

## 2025-05-03 NOTE — TELEPHONE ENCOUNTER
[facility]/Population Health Chart Review & Patient Outreach Details For Medication Adherence Project    Reason for Outreach Encounter: 3rd Party payor non-compliance report ([employer], BCBS, [facility], etc)  2.  Patient Outreach Method: [employer] message  3.   Medication in question: valsartan    LAST FILLED: 11/6/24 for 90 day supply  Hypertension Medications              valsartan (DIOVAN) 80 MG tablet Take 1 tablet (80 mg total) by mouth once daily.              4.  Reviewed and or Updates Made To: Patient Chart  5. Outreach Outcomes and/or actions taken: Sent inquiry to patient: Waiting for response.

## 2025-05-20 ENCOUNTER — PATIENT OUTREACH (OUTPATIENT)
Dept: ADMINISTRATIVE | Facility: HOSPITAL | Age: 65
End: 2025-05-20
Payer: COMMERCIAL

## 2025-05-21 DIAGNOSIS — E11.59 HYPERTENSION ASSOCIATED WITH DIABETES: ICD-10-CM

## 2025-05-21 DIAGNOSIS — R80.1 PERSISTENT PROTEINURIA: ICD-10-CM

## 2025-05-21 DIAGNOSIS — I15.2 HYPERTENSION ASSOCIATED WITH DIABETES: ICD-10-CM

## 2025-05-21 NOTE — TELEPHONE ENCOUNTER
Care Due:                  Date            Visit Type   Department     Provider  --------------------------------------------------------------------------------                                MYCHART                              FOLLOWUP/OF  [facility] FAM[facility]  Last Visit: 11-      FICE VISIT   MEDICINE       Yasir Emmanuel  Next Visit: None Scheduled  None         None Found                                                            Last  Test          Frequency    Reason                     Performed    Due Date  --------------------------------------------------------------------------------    Office Visit  15 months..  atorvastatin, azelastine,   11- 01-                             empagliflozin, ezetimibe,                             valsartan................    HBA1C.......  6 months...  empagliflozin............  10-   04-    [facility] Embedded Care Due Messages. Reference number: [id number].   5/21/2025 5:03:26 PM CDT

## 2025-05-22 DIAGNOSIS — E11.49 TYPE II DIABETES MELLITUS WITH NEUROLOGICAL MANIFESTATIONS: ICD-10-CM

## 2025-05-22 RX ORDER — VALSARTAN 80 MG/1
80 TABLET ORAL DAILY
Qty: 90 TABLET | Refills: 3 | Status: SHIPPED | OUTPATIENT
Start: 2025-05-22 | End: 2026-05-22

## 2025-05-22 NOTE — TELEPHONE ENCOUNTER
Refill Routing Note   Medication(s) are not appropriate for processing by [facility] for the following reason(s):        Non-participating provider    [facility] action(s):  Route             Appointments  past 12m or future 3m with PCP    Date Provider   Last Visit   10/16/2024 Sara Barraza, NP   Next Visit   5/27/2025 Sara Barraza, NP   ED visits in past 90 days: 0        Note composed:4:45 PM 05/22/2025

## 2025-05-22 NOTE — TELEPHONE ENCOUNTER
No care due was identified.  [facility] Embedded Care Due Messages. Reference number: [id number].   5/22/2025 4:43:53 PM CDT

## 2025-05-22 NOTE — TELEPHONE ENCOUNTER
Refill Routing Note   Medication(s) are not appropriate for processing by [facility] for the following reason(s):        Patient not seen by provider within 15 months    [facility] action(s):  Defer   Requires appointment : Yes        Medication Therapy Plan: Last in office visit 11/2023; [facility]      Appointments  past 12m or future 3m with PCP    Date Provider   Last Visit   11/1/2023 Yasir Emmanuel MD   Next Visit   Visit date not found Yasir Emmanuel MD   ED visits in past 90 days: 0        Note composed:7:17 PM 05/21/2025

## 2025-05-23 RX ORDER — TIRZEPATIDE 2.5 MG/.5ML
2.5 INJECTION, SOLUTION SUBCUTANEOUS
Qty: 2 ML | Refills: 5 | Status: SHIPPED | OUTPATIENT
Start: 2025-05-23

## 2025-05-26 ENCOUNTER — TELEPHONE (OUTPATIENT)
Dept: PHARMACY | Facility: CLINIC | Age: 65
End: 2025-05-26
Payer: COMMERCIAL

## 2025-05-26 NOTE — TELEPHONE ENCOUNTER
[facility]/Population Health Chart Review & Patient Outreach Details For Medication Adherence Project    Reason for Outreach Encounter: 3rd Party payor non-compliance report ([employer], BCBS, C, etc)  2.  Patient Outreach Method: Reviewed patient chart  and [employer] message  3.   Medication in question:    Hyperlipidemia Medications              atorvastatin (LIPITOR) 80 MG tablet TAKE 1 TABLET BY MOUTH EVERY EVENING    ezetimibe (ZETIA) 10 mg tablet TAKE 1 TABLET BY MOUTH EVERY EVENING               LF 30 ds 2/13/25    4.  Reviewed and or Updates Made To: Patient Chart  5. Outreach Outcomes and/or actions taken: Sent inquiry to patient: Waiting for response  Additional Notes:

## 2025-06-03 ENCOUNTER — OFFICE VISIT (OUTPATIENT)
Dept: FAMILY MEDICINE | Facility: CLINIC | Age: 65
End: 2025-06-03
Payer: COMMERCIAL

## 2025-06-03 VITALS
DIASTOLIC BLOOD PRESSURE: 68 MMHG | SYSTOLIC BLOOD PRESSURE: 126 MMHG | HEIGHT: 69 IN | WEIGHT: 293 LBS | BODY MASS INDEX: 43.4 KG/M2 | TEMPERATURE: 98 F | HEART RATE: 71 BPM

## 2025-06-03 DIAGNOSIS — Z01.419 WELL WOMAN EXAM WITH ROUTINE GYNECOLOGICAL EXAM: Primary | ICD-10-CM

## 2025-06-03 PROCEDURE — 3078F DIAST BP <80 MM HG: CPT | Mod: CPTII,S$GLB,, | Performed by: NURSE PRACTITIONER

## 2025-06-03 PROCEDURE — 3074F SYST BP LT 130 MM HG: CPT | Mod: CPTII,S$GLB,, | Performed by: NURSE PRACTITIONER

## 2025-06-03 PROCEDURE — 87624 HPV HI-RISK TYP POOLED RSLT: CPT | Performed by: NURSE PRACTITIONER

## 2025-06-03 PROCEDURE — 1160F RVW MEDS BY RX/DR IN RCRD: CPT | Mod: CPTII,S$GLB,, | Performed by: NURSE PRACTITIONER

## 2025-06-03 PROCEDURE — 4010F ACE/ARB THERAPY RXD/TAKEN: CPT | Mod: CPTII,S$GLB,, | Performed by: NURSE PRACTITIONER

## 2025-06-03 PROCEDURE — 99999 PR PBB SHADOW E&M-EST. PATIENT-LVL V: CPT | Mod: PBBFAC,,, | Performed by: NURSE PRACTITIONER

## 2025-06-03 PROCEDURE — 1159F MED LIST DOCD IN RCRD: CPT | Mod: CPTII,S$GLB,, | Performed by: NURSE PRACTITIONER

## 2025-06-03 PROCEDURE — 88175 CYTOPATH C/V AUTO FLUID REDO: CPT | Mod: TC | Performed by: NURSE PRACTITIONER

## 2025-06-04 ENCOUNTER — PATIENT MESSAGE (OUTPATIENT)
Dept: FAMILY MEDICINE | Facility: CLINIC | Age: 65
End: 2025-06-04
Payer: COMMERCIAL

## 2025-06-04 DIAGNOSIS — E11.49 TYPE II DIABETES MELLITUS WITH NEUROLOGICAL MANIFESTATIONS: Primary | ICD-10-CM

## 2025-06-05 RX ORDER — BLOOD-GLUCOSE TRANSMITTER
1 EACH MISCELLANEOUS DAILY
Qty: 1 EACH | Refills: 0 | Status: SHIPPED | OUTPATIENT
Start: 2025-06-05

## 2025-06-05 RX ORDER — BLOOD-GLUCOSE SENSOR
1 EACH MISCELLANEOUS
Qty: 3 EACH | Refills: 11 | Status: SHIPPED | OUTPATIENT
Start: 2025-06-05 | End: 2026-06-05

## 2025-06-06 LAB
INSULIN SERPL-ACNC: NORMAL U[IU]/ML
LAB AP BETHESDA CATEGORY: NORMAL
LAB AP CLINICAL FINDINGS: NORMAL
LAB AP CONTRACEPTIVES: NORMAL
LAB AP OCHS PAP SPECIMEN ADEQUACY: NORMAL
LAB AP OHS PAP INTERPRETATION: NORMAL
LAB AP PAP DISCLAIMER COMMENTS: NORMAL
LAB AP PAP ESTROGEN REPLACEMENT THERAPY: NORMAL
LAB AP PAP PMP: NORMAL
LAB AP PAP PREVIOUS BX: NORMAL
LAB AP PAP PRIOR TREATMENT: NORMAL
LAB AP PERFORMING LOCATION(S): NORMAL

## 2025-06-10 ENCOUNTER — TELEPHONE (OUTPATIENT)
Dept: PHARMACY | Facility: CLINIC | Age: 65
End: 2025-06-10
Payer: COMMERCIAL

## 2025-06-10 ENCOUNTER — RESULTS FOLLOW-UP (OUTPATIENT)
Dept: FAMILY MEDICINE | Facility: CLINIC | Age: 65
End: 2025-06-10

## 2025-06-10 NOTE — TELEPHONE ENCOUNTER
[facility]/Population Health Chart Review & Patient Outreach Details For Medication Adherence Project    Reason for Outreach Encounter: 3rd Party payor non-compliance report ([employer], BCBS, C, etc)  2.  Patient Outreach Method: Reviewed patient chart  and [employer] message  3.   Medication in question:    Hyperlipidemia Medications              atorvastatin (LIPITOR) 80 MG tablet TAKE 1 TABLET BY MOUTH EVERY EVENING    ezetimibe (ZETIA) 10 mg tablet TAKE 1 TABLET BY MOUTH EVERY EVENING                  LF 30 ds 2/13/25    4.  Reviewed and or Updates Made To: Patient Chart  5. Outreach Outcomes and/or actions taken: Sent inquiry to patient: Waiting for response  Additional Notes:

## 2025-06-11 NOTE — TELEPHONE ENCOUNTER
[facility]/Population Health Chart Review & Patient Outreach Details For Medication Adherence Project    Reason for Outreach Encounter: 3rd Party payor non-compliance report ([employer], BCBS, UHC, etc)  2.  Patient Outreach Method: Reviewed Patient Chart  3.   Medication in question: valsartan   LAST FILLED: n/a        4.  Reviewed and or Updates Made To: Patient Chart  5. Outreach Outcomes and/or actions taken: Medication discontinued    Additional Notes:

## 2025-06-16 ENCOUNTER — PROCEDURE VISIT (OUTPATIENT)
Dept: UROLOGY | Facility: CLINIC | Age: 65
End: 2025-06-16
Payer: COMMERCIAL

## 2025-06-16 ENCOUNTER — HOSPITAL ENCOUNTER (OUTPATIENT)
Dept: RADIOLOGY | Facility: HOSPITAL | Age: 65
Discharge: HOME | End: 2025-06-16
Attending: FAMILY MEDICINE
Payer: COMMERCIAL

## 2025-06-16 DIAGNOSIS — C67.2 MALIGNANT NEOPLASM OF LATERAL WALL OF URINARY BLADDER: Primary | ICD-10-CM

## 2025-06-16 DIAGNOSIS — N39.41 URGE INCONTINENCE: ICD-10-CM

## 2025-06-16 DIAGNOSIS — Z12.31 ENCOUNTER FOR SCREENING MAMMOGRAM FOR BREAST CANCER: ICD-10-CM

## 2025-06-16 PROCEDURE — 52000 CYSTOURETHROSCOPY: CPT | Mod: S$GLB,,, | Performed by: UROLOGY

## 2025-06-16 PROCEDURE — 77063 BREAST TOMOSYNTHESIS BI: CPT | Mod: 26,,, | Performed by: RADIOLOGY

## 2025-06-16 PROCEDURE — 99213 OFFICE O/P EST LOW 20 MIN: CPT | Mod: 25,S$GLB,, | Performed by: UROLOGY

## 2025-06-16 PROCEDURE — 88112 CYTOPATH CELL ENHANCE TECH: CPT | Mod: TC | Performed by: UROLOGY

## 2025-06-16 PROCEDURE — 77067 SCR MAMMO BI INCL CAD: CPT | Mod: TC

## 2025-06-16 PROCEDURE — 77067 SCR MAMMO BI INCL CAD: CPT | Mod: 26,,, | Performed by: RADIOLOGY

## 2025-06-16 RX ORDER — MIRABEGRON 50 MG/1
50 TABLET, FILM COATED, EXTENDED RELEASE ORAL DAILY
Qty: 30 TABLET | Refills: 11 | Status: SHIPPED | OUTPATIENT
Start: 2025-06-16 | End: 2026-06-16

## 2025-06-16 RX ORDER — CIPROFLOXACIN 500 MG/1
500 TABLET, FILM COATED ORAL
Status: COMPLETED | OUTPATIENT
Start: 2025-06-16 | End: 2025-06-16

## 2025-06-16 RX ADMIN — CIPROFLOXACIN 500 MG: 500 TABLET, FILM COATED ORAL at 11:06

## 2025-06-16 NOTE — PROGRESS NOTES
..Per Dr. Clark oral ciprofloxacin 500 MG was given to pt. Pt instructed to remain in clinic for 15 minutes to monitor for signs & symptoms of adverse reaction. Pt verbalized understanding.

## 2025-06-16 NOTE — PROCEDURES
Procedures  Chief Complaint:   Encounter Diagnoses   Name Primary?    Malignant neoplasm of lateral wall of urinary bladder Yes    Urge incontinence          HPI:    6/16/25- here for surveillance cystoscopy.  She has been having more issues with urge incontinence since her last surgery.    62-year-old female who comes in from [place] with a new diagnosis of possible bladder cancer.  She states this past summer she had difficulty voiding and a Tomas catheter was placed.  She did have a little bit hematuria afterwards, could have been related to the catheter placement.  She was doing well until on proximally Christmas she began to have gross hematuria with no other significant symptoms.  This eventually cleared up on its own, CT scan demonstrated possible bladder masses and she was referred to an outside urologist.  Cystoscopy demonstrated significant tumor surrounding the right ureteral orifice, surgeries were attempted, but could not get scheduled due to other factors for preoperative clearance.  Patient is now cleared, but has decided to come to [facility] for treatment.  Patient is a current smoker, no other lower urinary tract symptoms.  No other urological or gynecological history, she has all of her female organs.  No evidence of true incontinence, no constipation.  She would an uncle with bladder cancer, no other family history of urological cancers or stones.    Allergies:  Penicillins    Medications:  has a current medication list which includes the following prescription(s): atorvastatin, azelastine, benzonatate, dexcom g6 sensor, cycloset, dexcom g6 sensor, dexcom g6 transmitter, ezetimibe, baqsimi, hyoscyamine, insulin, insulin aspart u-100, lisinopril, multivitamin, phenazopyridine, timolol maleate 0.5%, trulicity, and varenicline.    Review of Systems:  General: No fever, chills, fatigability, or weight loss.  Skin: No rashes, itching, or changes in color or texture of skin.  Chest: Denies EASON, cyanosis,  wheezing, cough, and sputum production.  Abdomen: Appetite fine. No weight loss. Denies diarrhea, abdominal pain, hematemesis, or blood in stool.  Musculoskeletal: No joint stiffness or swelling. Denies back pain.  : As above.  All other review of systems negative.    PMH:   has a past medical history of Asthma, Cigarette nicotine dependence without complication (2017), Colon polyp, DM (diabetes mellitus) type II uncontrolled with eye manifestation, Hyperplastic colon polyp (2015), Hypertension, Morbid obesity (10/08/2014), and Sleep apnea.    PSH:   has a past surgical history that includes  section; Eye surgery (Right); turbt (transurethral resection of bladder tumor) (N/A, 2023); and Cystoscopy with ureteroscopy, retrograde pyelography, and insertion of stent (Bilateral, 2023).    FamHx: family history includes Bladder Cancer in her paternal uncle; Coronary artery disease in her father; Diabetes in her father; Hypertension in her father; Lung cancer in her father; Lymphoma in her paternal uncle; Pancreatic cancer in her mother; Stroke in her father.    SocHx:  reports that she has been smoking cigarettes. She has a 27.75 pack-year smoking history. She has never used smokeless tobacco. She reports that she does not drink alcohol and does not use drugs.      Physical Exam:  There were no vitals filed for this visit.    General: A&Ox3, no apparent distress, no deformities  Neck: No masses, normal ROM  Lungs: normal inspiration, no use of accessory muscles  Heart: normal pulse, no arrhythmias  Abdomen: Soft, NT, ND, no masses, no hernias, no hepatosplenomegaly  Skin: The skin is warm and dry. No jaundice.  Ext: No c/c/e.  : - No pelvic floor prolapse.  Normal introitus, no urethral abnomralities. No Perineal abnormalities.    Labs/Studies:   Cytology negative   CT urogram 8 mm right renal stone   CT urogram right bladder wall tumor     Procedure: Diagnostic  Cystoscopy    Procedure in Detail: After proper consents were obtained, the patient was prepped and draped in normal sterile fashion for diagnostic cystoscopy. The flexible cystoscope was then introduced into the urethra, and advanced into the bladder under direct vision. The urethral mucosa appeared normal, and no strictures were noted. The sphincter appeared to be normal.  The bladder neck was normal. Inspection of the interior of the bladder was then carried out. The trigone was unremarkable, with no mucosal lesions. The ureteral orifices were normal in position and configuration. Systematic inspection of the mucosa of the bladder was then carried out, rotating the cystoscope so that all areas of the left and right lateral walls, the dome of the bladder, and the posterior wall were all visualized. The cystoscope was then advanced further into the bladder, and maximum deflection of the scope was performed so that the bladder neck could be inspected. Suspicious areas on posterior wall. The cystoscope was then removed, and the procedure terminated.     Findings: suspicious areas on posterior wall cystoscopy       Impression/Plan:     1. Bladder cancer-  vesicolithalopaxy negative bx  5/2/23,  pTa high TURBT, retros normal  1/31/23    Follow up on cytology, if abnormal then will pursue cysto and biopsy of these suspicious areas.  Otherwise see me in 6 months with a cystoscopy and cytology.    2. Nephrolithiasis- clear and call with any issues prior to the next appointment.      3. Urge incontinence- early and  new findings, due to her glaucoma we will avoid anticholinergics and initiate Myrbetriq 50 mg.  She knows to monitor for hypertension, if she has any issues she will stop the medication and contact our office.  Reassess at the next appointment.

## 2025-06-17 ENCOUNTER — RESULTS FOLLOW-UP (OUTPATIENT)
Dept: FAMILY MEDICINE | Facility: CLINIC | Age: 65
End: 2025-06-17

## 2025-06-19 LAB
ESTROGEN SERPL-MCNC: NORMAL PG/ML
INSULIN SERPL-ACNC: NORMAL U[IU]/ML
LAB AP CLINICAL INFORMATION: NORMAL
LAB AP GROSS DESCRIPTION: NORMAL
LAB AP PERFORMING LOCATION(S): NORMAL
LAB AP URINE CYTOLOGY INTERPRETATION SPECIMEN 1: NORMAL

## 2025-06-27 ENCOUNTER — E-VISIT (OUTPATIENT)
Dept: FAMILY MEDICINE | Facility: CLINIC | Age: 65
End: 2025-06-27
Payer: COMMERCIAL

## 2025-06-27 DIAGNOSIS — H40.9 GLAUCOMA, UNSPECIFIED GLAUCOMA TYPE, UNSPECIFIED LATERALITY: Primary | ICD-10-CM

## 2025-06-27 PROCEDURE — 99421 OL DIG E/M SVC 5-10 MIN: CPT | Mod: ,,, | Performed by: FAMILY MEDICINE

## 2025-06-30 RX ORDER — TIMOLOL MALEATE 5 MG/ML
1 SOLUTION/ DROPS OPHTHALMIC 2 TIMES DAILY
Qty: 15 ML | Refills: 0 | Status: SHIPPED | OUTPATIENT
Start: 2025-06-30

## 2025-06-30 NOTE — PROGRESS NOTES
Patient ID: Chelsie Garrett is a 64 y.o. female.        E-Visit Time Tracking:   Day 1 Time (in minutes): 5  Total Time (in minutes): 5      Chief Complaint: General Illness (Entered automatically based on patient selection in [employer].)      The patient initiated a request through [employer] on 6/27/2025 for evaluation and management with a chief complaint of General Illness (Entered automatically based on patient selection in [employer].)     I evaluated the questionnaire submission on 06/30/2025.    [facility] Evisit [facility]-Medication    6/27/2025  6:17 PM CDT - Filed by Patient   What do you need help with? Medication Request   Do you agree to participate in an E-Visit? Yes   If you have any of the following symptoms, please present to your local emergency room or call 911:  I acknowledge   Medication requests for narcotics will not be addressed via an E-Visit.  Please schedule an appointment. I acknowledge   Do you want to address a new or existing medication? (Start a new medication, Address a current medication) Address a current medication   What is the main issue you would like addressed today? timolol refill   Would you like to change or continue your medication? (Change medication, Continue medication) Continue medication   What is the name of the medication you would like to continue? timolol   Are you taking your medication as prescribed? Yes   Which option below best describes the reason for your request? (Renew refills, Prior authorization is required) Renew refills    What medical condition is the  medication intended to treat? glaucoma   Has the medication helped your condition? (Yes, No, Not sure) Yes   Have you experienced any side effects from the medication? No   Provide any information you feel is important to your history not asked above will not be able to see my specialist until July 24.  He is not in network.  I don't know what to do.   Please attach any relevant images or files    Are you able to  take your vitals? No         Encounter Diagnosis   Name Primary?    Glaucoma, unspecified glaucoma type, unspecified laterality Yes        No orders of the defined types were placed in this encounter.     Medications Ordered This Encounter   Medications    timolol maleate 0.5% (TIMOPTIC) 0.5 % Drop     Sig: Place 1 drop into both eyes 2 (two) times daily.     Dispense:  15 mL     Refill:  0        No follow-ups on file.

## 2025-07-08 DIAGNOSIS — R80.1 PERSISTENT PROTEINURIA: ICD-10-CM

## 2025-07-08 DIAGNOSIS — E11.49 TYPE II DIABETES MELLITUS WITH NEUROLOGICAL MANIFESTATIONS: ICD-10-CM

## 2025-07-08 RX ORDER — EMPAGLIFLOZIN 25 MG/1
25 TABLET, FILM COATED ORAL
Qty: 90 TABLET | Refills: 0 | Status: SHIPPED | OUTPATIENT
Start: 2025-07-08

## 2025-07-08 NOTE — TELEPHONE ENCOUNTER
No care due was identified.  [facility] Embedded Care Due Messages. Reference number: [id number].   7/08/2025 9:56:30 AM CDT

## 2025-07-23 ENCOUNTER — TELEPHONE (OUTPATIENT)
Dept: PHARMACY | Facility: CLINIC | Age: 65
End: 2025-07-23
Payer: COMMERCIAL

## 2025-08-01 ENCOUNTER — OFFICE VISIT (OUTPATIENT)
Dept: URGENT CARE | Facility: CLINIC | Age: 65
End: 2025-08-01
Payer: COMMERCIAL

## 2025-08-01 VITALS
OXYGEN SATURATION: 95 % | SYSTOLIC BLOOD PRESSURE: 150 MMHG | TEMPERATURE: 98 F | DIASTOLIC BLOOD PRESSURE: 73 MMHG | HEART RATE: 70 BPM | BODY MASS INDEX: 43.4 KG/M2 | WEIGHT: 293 LBS | RESPIRATION RATE: 18 BRPM | HEIGHT: 69 IN

## 2025-08-01 DIAGNOSIS — R10.9 ACUTE RIGHT FLANK PAIN: Primary | ICD-10-CM

## 2025-08-01 DIAGNOSIS — T14.8XXA MUSCLE STRAIN: ICD-10-CM

## 2025-08-01 DIAGNOSIS — I10 ELEVATED BLOOD PRESSURE READING WITH DIAGNOSIS OF HYPERTENSION: ICD-10-CM

## 2025-08-01 LAB
BILIRUBIN, UA POC OHS: NEGATIVE
BLOOD, UA POC OHS: NEGATIVE
CLARITY, UA POC OHS: ABNORMAL
COLOR, UA POC OHS: YELLOW
GLUCOSE, UA POC OHS: 500
KETONES, UA POC OHS: NEGATIVE
LEUKOCYTES, UA POC OHS: NEGATIVE
NITRITE, UA POC OHS: NEGATIVE
PH, UA POC OHS: 6
PROTEIN, UA POC OHS: NEGATIVE
SPECIFIC GRAVITY, UA POC OHS: 1.02
UROBILINOGEN, UA POC OHS: 1

## 2025-08-01 PROCEDURE — 81001 URINALYSIS AUTO W/SCOPE: CPT | Performed by: NURSE PRACTITIONER

## 2025-08-01 RX ORDER — NITROFURANTOIN 25; 75 MG/1; MG/1
100 CAPSULE ORAL 2 TIMES DAILY
Qty: 14 CAPSULE | Refills: 0 | Status: SHIPPED | OUTPATIENT
Start: 2025-08-01 | End: 2025-08-08

## 2025-08-01 RX ORDER — METHOCARBAMOL 500 MG/1
500 TABLET, FILM COATED ORAL 4 TIMES DAILY PRN
Qty: 30 TABLET | Refills: 1 | Status: SHIPPED | OUTPATIENT
Start: 2025-08-01

## 2025-08-01 NOTE — PATIENT INSTRUCTIONS
Increase fluids   Void promptly   Urine culture pending 3-5 days   Wipe front to back   Any  inability to urinate , fever, chills, nausea/vomiting, > flank pain to ER       Patient Instructions Muscle strain    Rest activity ad rex   Warm moist heat to lower back   Tylenol 650 mg every 6-8 hours as needed for pain   Trial of muscle relaxant 3 times a day prn spasm pain     Any worsening pain with fever, incontinence of urine or bowel or inability to stand-911 to ER

## 2025-08-01 NOTE — PROGRESS NOTES
"Subjective:      Patient ID: Chelsie Garrett is a 65 y.o. female.    Vitals:  height is 5' 9" (1.753 m) and weight is 139 kg (306 lb 7 oz) (abnormal). Her oral temperature is 97.8 °F (36.6 °C). Her blood pressure is 150/73 (abnormal) and her pulse is 70. Her respiration is 18 and oxygen saturation is 95%.     Chief Complaint: Other Misc (back pain in the right kidney area like a sharp knife when i move.  It could be a muscle strain but considering I am diabetic and have the beginnings of kidney function issues, I want to have it checked with a urine test or general care to rule out. - Entered by patient)    64 yo female presents to clinic with complaints of right sided kidney pain that started 2 days ago.  Pt states that she is a DM II and would like to a UA to r/o a bladder infection    ROS   Objective:     Physical Exam   Constitutional: She is oriented to person, place, and time. She appears well-developed.   HENT:   Head: Normocephalic and atraumatic.   Ears:   Right Ear: External ear normal.   Left Ear: External ear normal.   Nose: Nose normal. No nasal deformity. No epistaxis.   Mouth/Throat: Oropharynx is clear and moist and mucous membranes are normal. Mucous membranes are moist.   Eyes: Conjunctivae and lids are normal.   Neck: Trachea normal and phonation normal. Neck supple.   Cardiovascular: Normal pulses.   Pulmonary/Chest: Effort normal.   Abdominal: Normal appearance and bowel sounds are normal. She exhibits no distension. Soft. There is no abdominal tenderness. There is right CVA tenderness. There is no left CVA tenderness.   Neurological: She is alert and oriented to person, place, and time. She displays no weakness.   Skin: Skin is warm, dry and intact.   Psychiatric: Her speech is normal and behavior is normal. Judgment and thought content normal.   Nursing note and vitals reviewed.      Assessment:     1. Acute right flank pain    2. Muscle strain    3. Elevated blood pressure reading with " diagnosis of hypertension      Results for orders placed or performed in visit on 08/01/25   POCT Urinalysis(Instrument)    Collection Time: 08/01/25 12:35 PM   Result Value Ref Range    Color, POC UA Yellow Yellow, Straw, Colorless    Clarity, POC UA Cloudy (A) Clear    Glucose, POC  (A) Negative    Bilirubin, POC UA Negative Negative    Ketones, POC UA Negative Negative    Spec Grav POC UA 1.020 1.005 - 1.030    Blood, POC UA Negative Negative    pH, POC UA 6.0 5.0 - 8.0    Protein, POC UA Negative Negative    Urobilinogen, POC UA 1.0 <=1.0    Nitrite, POC UA Negative Negative    WBC, POC UA Negative Negative       Plan:       Acute right flank pain  -     nitrofurantoin, macrocrystal-monohydrate, (MACROBID) 100 MG capsule; Take 1 capsule (100 mg total) by mouth 2 (two) times daily. for 7 days  Dispense: 14 capsule; Refill: 0  -     Urinalysis, Reflex to Urine Culture Urine, Clean Catch  -     POCT Urinalysis(Instrument)    Muscle strain  -     methocarbamoL (ROBAXIN) 500 MG Tab; Take 1 tablet (500 mg total) by mouth 4 (four) times daily as needed (muscle spasm pain).  Dispense: 30 tablet; Refill: 1    Elevated blood pressure reading with diagnosis of hypertension        Patient Instructions   Increase fluids   Void promptly   Urine culture pending 3-5 days   Wipe front to back   Any  inability to urinate , fever, chills, nausea/vomiting, > flank pain to ER       Patient Instructions Muscle strain    Rest activity ad rex   Warm moist heat to lower back   Tylenol 650 mg every 6-8 hours as needed for pain   Trial of muscle relaxant 3 times a day prn spasm pain     Any worsening pain with fever, incontinence of urine or bowel or inability to stand-911 to ER          No follow-ups on file.

## 2025-08-02 LAB
BACTERIA #/AREA URNS AUTO: NORMAL /HPF
BILIRUB UR QL STRIP.AUTO: NEGATIVE
CLARITY UR: CLEAR
COLOR UR AUTO: YELLOW
GLUCOSE UR QL STRIP: ABNORMAL
HGB UR QL STRIP: NEGATIVE
KETONES UR QL STRIP: NEGATIVE
LEUKOCYTE ESTERASE UR QL STRIP: NEGATIVE
MICROSCOPIC COMMENT: NORMAL
NITRITE UR QL STRIP: NEGATIVE
PH UR STRIP: 6 [PH]
PROT UR QL STRIP: NEGATIVE
RBC #/AREA URNS AUTO: 4 /HPF (ref 0–4)
SP GR UR STRIP: 1.03
SQUAMOUS #/AREA URNS AUTO: <1 /HPF
UROBILINOGEN UR STRIP-ACNC: NEGATIVE EU/DL
WBC #/AREA URNS AUTO: 2 /HPF (ref 0–5)
YEAST UR QL AUTO: NORMAL /HPF

## 2025-08-03 ENCOUNTER — RESULTS FOLLOW-UP (OUTPATIENT)
Dept: URGENT CARE | Facility: CLINIC | Age: 65
End: 2025-08-03
Payer: COMMERCIAL

## 2025-08-03 ENCOUNTER — TELEPHONE (OUTPATIENT)
Dept: URGENT CARE | Facility: CLINIC | Age: 65
End: 2025-08-03
Payer: COMMERCIAL

## (undated) DEVICE — GLOVE SURG BIOGEL LATEX SZ 7.5

## (undated) DEVICE — ELECTRODE LOOP CUTTING BIPOLAR

## (undated) DEVICE — BOWL STERILE LARGE 32OZ

## (undated) DEVICE — GAUZE SPONGE 4X4 12PLY

## (undated) DEVICE — SUPPORT ULNA NERVE PROTECTOR

## (undated) DEVICE — SYR 50ML CATH TIP

## (undated) DEVICE — CONTAINER SPECIMEN OR STER 4OZ

## (undated) DEVICE — CATH POLLACK OPEN-END FLEXI-TI

## (undated) DEVICE — SYR 10CC LUER LOCK

## (undated) DEVICE — UNDERGLOVES BIOGEL PI SZ 6 LF

## (undated) DEVICE — DRAPE T CYSTOSCOPY STERILE

## (undated) DEVICE — TOWEL OR DISP STRL BLUE 4/PK

## (undated) DEVICE — GOWN POLY REINF X-LONG XL

## (undated) DEVICE — TRAY SKIN SCRUB WET PREMIUM

## (undated) DEVICE — EVACUATOR BLADDER UROVAC ADPT

## (undated) DEVICE — BAG DRAIN URINARY CONT IRRG

## (undated) DEVICE — UROVIEW 2600/2800

## (undated) DEVICE — SET IRRIGATION WARMING NORMAL

## (undated) DEVICE — COVER LIGHT HANDLE 80/CA

## (undated) DEVICE — SOL WATER STRL IRR 1000ML

## (undated) DEVICE — Device

## (undated) DEVICE — SKIN MARKER DEVON 160

## (undated) DEVICE — GUIDE WIRE MOTION .035 X 150CM